# Patient Record
Sex: FEMALE | Race: WHITE | NOT HISPANIC OR LATINO | Employment: OTHER | URBAN - METROPOLITAN AREA
[De-identification: names, ages, dates, MRNs, and addresses within clinical notes are randomized per-mention and may not be internally consistent; named-entity substitution may affect disease eponyms.]

---

## 2017-03-13 ENCOUNTER — OFFICE VISIT (OUTPATIENT)
Dept: OBSTETRICS AND GYNECOLOGY | Facility: CLINIC | Age: 82
End: 2017-03-13

## 2017-03-13 ENCOUNTER — APPOINTMENT (OUTPATIENT)
Dept: WOMENS IMAGING | Facility: HOSPITAL | Age: 82
End: 2017-03-13

## 2017-03-13 VITALS
DIASTOLIC BLOOD PRESSURE: 84 MMHG | WEIGHT: 139.2 LBS | BODY MASS INDEX: 23.76 KG/M2 | HEIGHT: 64 IN | SYSTOLIC BLOOD PRESSURE: 126 MMHG | HEART RATE: 66 BPM

## 2017-03-13 DIAGNOSIS — Z12.12 SCREENING FOR RECTAL CANCER: ICD-10-CM

## 2017-03-13 DIAGNOSIS — Z78.0 MENOPAUSE: ICD-10-CM

## 2017-03-13 DIAGNOSIS — Z01.419 WELL WOMAN EXAM WITH ROUTINE GYNECOLOGICAL EXAM: Primary | ICD-10-CM

## 2017-03-13 LAB
DEVELOPER EXPIRATION DATE: NORMAL
DEVELOPER LOT NUMBER: NORMAL
EXPIRATION DATE: NORMAL
FECAL OCCULT BLOOD SCREEN, POC: NEGATIVE
Lab: NORMAL
NEGATIVE CONTROL: NEGATIVE
POSITIVE CONTROL: POSITIVE

## 2017-03-13 PROCEDURE — 99397 PER PM REEVAL EST PAT 65+ YR: CPT | Performed by: OBSTETRICS & GYNECOLOGY

## 2017-03-13 PROCEDURE — 82274 ASSAY TEST FOR BLOOD FECAL: CPT | Performed by: OBSTETRICS & GYNECOLOGY

## 2017-03-13 PROCEDURE — 77063 BREAST TOMOSYNTHESIS BI: CPT | Performed by: RADIOLOGY

## 2017-03-13 PROCEDURE — G0202 SCR MAMMO BI INCL CAD: HCPCS | Performed by: RADIOLOGY

## 2017-03-13 PROCEDURE — 77067 SCR MAMMO BI INCL CAD: CPT | Performed by: RADIOLOGY

## 2017-03-13 RX ORDER — METOPROLOL SUCCINATE 25 MG/1
TABLET, EXTENDED RELEASE ORAL
COMMUNITY
Start: 2014-06-23 | End: 2018-03-20

## 2017-03-13 RX ORDER — DOCUSATE SODIUM 100 MG/1
100 CAPSULE, LIQUID FILLED ORAL
COMMUNITY
End: 2019-10-01

## 2017-03-13 RX ORDER — ESCITALOPRAM OXALATE 5 MG/1
5 TABLET ORAL
COMMUNITY

## 2017-03-13 RX ORDER — SIMVASTATIN 20 MG
20 TABLET ORAL
COMMUNITY
End: 2019-10-01

## 2017-03-13 RX ORDER — VITAMIN E 268 MG
400 CAPSULE ORAL
COMMUNITY

## 2017-03-13 RX ORDER — AMOXICILLIN 500 MG
2400 CAPSULE ORAL
COMMUNITY
End: 2019-10-01

## 2017-03-13 RX ORDER — LEVOTHYROXINE SODIUM 0.07 MG/1
75 TABLET ORAL
COMMUNITY
End: 2019-10-01

## 2017-03-13 RX ORDER — MULTIVITAMIN
1 TABLET ORAL
COMMUNITY
End: 2019-10-01

## 2017-03-13 NOTE — PROGRESS NOTES
Anton De La Fuente is a 85 y.o. female     CC: Annual    Last annual 2016  Last pap 2011  Last mammogram 2017  Last colonoscopy 2015, benign polyps removed.      History of Present Illness: reports no gyn concerns or complaints.  Had mammogram today; up to date on colonoscopy.    The following portions of the patient's history were reviewed and updated as appropriate: allergies, current medications, past family history, past medical history, past social history, past surgical history and problem list.    Review of Systems   Constitutional: Negative for fatigue and fever.   HENT: Negative for congestion.    Eyes: Negative for visual disturbance.   Respiratory: Negative for chest tightness and shortness of breath.    Cardiovascular: Negative for chest pain, palpitations and leg swelling.   Gastrointestinal: Negative for abdominal pain and blood in stool.   Endocrine: Negative for cold intolerance.   Genitourinary: Negative for difficulty urinating, dysuria, frequency, genital sores, hematuria, menstrual problem, pelvic pain, urgency, vaginal bleeding, vaginal discharge and vaginal pain.   Musculoskeletal: Negative for back pain.   Skin: Negative for color change and rash.   Neurological: Negative for syncope and headaches.   Psychiatric/Behavioral: Negative for sleep disturbance.       Past Medical History   Diagnosis Date   • Chronic kidney disease (CKD), stage IV (severe)    • High cholesterol    • Hypothyroid      Past Surgical History   Procedure Laterality Date   • Hysterectomy     • Cystectomy       OB History      Para Term  AB TAB SAB Ectopic Multiple Living    2 2        2        Menstrual History:  OB History      Para Term  AB TAB SAB Ectopic Multiple Living    2 2        2         No LMP recorded. Patient has had a hysterectomy.       Family History   Problem Relation Age of Onset   • Breast cancer Paternal Aunt      History   Smoking  Status   • Never Smoker   Smokeless Tobacco   • Not on file     History   Alcohol Use No       Objective   Physical Exam   Constitutional: She is oriented to person, place, and time. She appears well-developed and well-nourished.   HENT:   Head: Normocephalic and atraumatic.   Right Ear: External ear normal.   Left Ear: External ear normal.   Nose: Nose normal.   Eyes: EOM are normal. Pupils are equal, round, and reactive to light.   Neck: Normal range of motion. Neck supple. No thyromegaly present.   Cardiovascular: Normal rate, regular rhythm and normal heart sounds.    No murmur heard.  Pulmonary/Chest: Effort normal and breath sounds normal. She has no wheezes. She has no rales. She exhibits no tenderness. Right breast exhibits no inverted nipple, no mass, no nipple discharge, no skin change and no tenderness. Left breast exhibits no inverted nipple, no mass, no nipple discharge, no skin change and no tenderness. There is no breast swelling.   Abdominal: Soft. Bowel sounds are normal. She exhibits no distension and no mass. There is no tenderness. Hernia confirmed negative in the right inguinal area and confirmed negative in the left inguinal area.   Genitourinary: Rectal exam shows no mass, no tenderness and guaiac negative stool. No breast tenderness. Pelvic exam was performed with patient supine. There is no rash, tenderness or lesion on the right labia. There is no rash, tenderness or lesion on the left labia. Right adnexum displays no mass and no tenderness. Left adnexum displays no mass and no tenderness. No erythema, tenderness or bleeding in the vagina. No vaginal discharge found.   Genitourinary Comments: S/P hyst.   Musculoskeletal: Normal range of motion. She exhibits no edema.   Neurological: She is alert and oriented to person, place, and time.   Skin: Skin is warm and dry. No rash noted.   Psychiatric: She has a normal mood and affect. Judgment normal.   Nursing note and vitals  reviewed.        Assessment/Plan   Diagnoses and all orders for this visit:    Well woman exam with routine gynecological exam    Screening for rectal cancer    Menopause      Will continue with annual WWE's, annual mammography, SBE's, and daily calcium + D

## 2018-03-20 ENCOUNTER — PROCEDURE VISIT (OUTPATIENT)
Dept: OBSTETRICS AND GYNECOLOGY | Facility: CLINIC | Age: 83
End: 2018-03-20

## 2018-03-20 ENCOUNTER — OFFICE VISIT (OUTPATIENT)
Dept: OBSTETRICS AND GYNECOLOGY | Facility: CLINIC | Age: 83
End: 2018-03-20

## 2018-03-20 ENCOUNTER — APPOINTMENT (OUTPATIENT)
Dept: WOMENS IMAGING | Facility: HOSPITAL | Age: 83
End: 2018-03-20

## 2018-03-20 VITALS — BODY MASS INDEX: 23.22 KG/M2 | WEIGHT: 136 LBS | HEIGHT: 64 IN

## 2018-03-20 DIAGNOSIS — Z01.419 ENCOUNTER FOR GYNECOLOGICAL EXAMINATION WITHOUT ABNORMAL FINDING: Primary | ICD-10-CM

## 2018-03-20 DIAGNOSIS — Z12.31 VISIT FOR SCREENING MAMMOGRAM: Primary | ICD-10-CM

## 2018-03-20 PROCEDURE — 77067 SCR MAMMO BI INCL CAD: CPT | Performed by: RADIOLOGY

## 2018-03-20 PROCEDURE — 99397 PER PM REEVAL EST PAT 65+ YR: CPT | Performed by: OBSTETRICS & GYNECOLOGY

## 2018-03-20 PROCEDURE — 77067 SCR MAMMO BI INCL CAD: CPT | Performed by: OBSTETRICS & GYNECOLOGY

## 2018-03-20 RX ORDER — SIMVASTATIN 20 MG
20 TABLET ORAL
COMMUNITY
End: 2019-10-01

## 2018-03-20 NOTE — PROGRESS NOTES
GYN Annual Exam     CC- Here for annual exam.     Marianna Terrell is a 86 y.o. female who presents for annual well woman exam. Periods are absent due to Hysterectomy.      OB History      Para Term  AB Living    2 2    2    SAB TAB Ectopic Molar Multiple Live Births                   Current contraception: status post hysterectomy  History of abnormal Pap smear: no  Family history of uterine, colon or ovarian cancer: no  History of abnormal mammogram: no  Family history of breast cancer: no  Last Pap : 2011 NL   Last Mammo: today  Last Dexa: 1 mth ago, osteopenia   Last Colonoscopy:     Past Medical History:   Diagnosis Date   • Chronic kidney disease (CKD), stage IV (severe)    • High cholesterol    • Hypothyroid        Past Surgical History:   Procedure Laterality Date   • CYSTECTOMY     • HYSTERECTOMY           Current Outpatient Prescriptions:   •  aspirin 81 MG tablet, Take 81 mg by mouth., Disp: , Rfl:   •  calcium citrate-vitamin d (CITRACAL) 200-250 MG-UNIT tablet tablet, Take 1 tablet by mouth., Disp: , Rfl:   •  docusate sodium (COLACE) 100 MG capsule, Take 100 mg by mouth., Disp: , Rfl:   •  escitalopram (LEXAPRO) 5 MG tablet, Take 5 mg by mouth., Disp: , Rfl:   •  levothyroxine (SYNTHROID, LEVOTHROID) 75 MCG tablet, Take 75 mcg by mouth., Disp: , Rfl:   •  Multiple Vitamin (MULTIVITAMIN) tablet, Take 1 tablet by mouth., Disp: , Rfl:   •  Omega-3 Fatty Acids (FISH OIL) 1200 MG capsule capsule, Take 2,400 mg by mouth., Disp: , Rfl:   •  simvastatin (ZOCOR) 20 MG tablet, Take 20 mg by mouth., Disp: , Rfl:   •  simvastatin (ZOCOR) 20 MG tablet, Take 20 mg by mouth., Disp: , Rfl:   •  vitamin E 400 UNIT capsule, Take 400 Units by mouth., Disp: , Rfl:     No Known Allergies    Social History   Substance Use Topics   • Smoking status: Never Smoker   • Smokeless tobacco: Never Used   • Alcohol use No       Family History   Problem Relation Age of Onset   • Breast cancer Paternal Aunt    •  "Ovarian cancer Neg Hx    • Uterine cancer Neg Hx    • Colon cancer Neg Hx    • Deep vein thrombosis Neg Hx    • Pulmonary embolism Neg Hx        Review of Systems   Constitutional: Negative for chills and fever.   Gastrointestinal: Negative for abdominal pain.   Genitourinary: Negative for dysuria, pelvic pain, vaginal bleeding and vaginal discharge.   All other systems reviewed and are negative.      Ht 162.6 cm (64\")   Wt 61.7 kg (136 lb)   Breastfeeding? No   BMI 23.34 kg/m²     Physical Exam   Constitutional: She is oriented to person, place, and time. She appears well-developed and well-nourished. No distress.   HENT:   Head: Normocephalic and atraumatic.   Eyes: Conjunctivae are normal.   Neck: Normal range of motion. Neck supple. No thyromegaly present.   Cardiovascular: Normal rate and regular rhythm.    No murmur heard.  Pulmonary/Chest: Effort normal and breath sounds normal. Right breast exhibits no inverted nipple, no mass and no nipple discharge. Left breast exhibits no inverted nipple, no mass and no nipple discharge.   Abdominal: Soft. Bowel sounds are normal. She exhibits no distension. There is no tenderness.   Genitourinary: Rectum normal and vagina normal. Rectal exam shows no mass and anal tone normal. Pelvic exam was performed with patient supine. There is no lesion on the right labia. There is no lesion on the left labia. Right adnexum displays no mass and no tenderness. Left adnexum displays no mass and no tenderness. No bleeding (cuff closed. No uterus or cervix ) in the vagina. No vaginal discharge found.   Musculoskeletal: She exhibits no edema.   Lymphadenopathy:        Right: No inguinal adenopathy present.        Left: No inguinal adenopathy present.   Neurological: She is alert and oriented to person, place, and time.   Skin: No rash noted.   Psychiatric: She has a normal mood and affect. Her behavior is normal.          Assessment     1) GYN annual well woman exam.        Plan "     1) Breast Health - Clinical breast exam & mammogram yearly, Self breast awareness monthly  2) Pap - updated today   3) Smoking status- Non-smoking   4) Colon health - screening colonoscopy recommended if not up to date  5) Bone health - Weight bearing exercise, dietary calcium recommendations and vitamin D reviewed.   6) Seat belts & Sunscreen recommended  7) Follow up prn and one year      Saw Monroe MD   3/20/2018  1:26 PM

## 2018-03-22 LAB
CYTOLOGIST CVX/VAG CYTO: NORMAL
CYTOLOGY CVX/VAG DOC THIN PREP: NORMAL
DX ICD CODE: NORMAL
HIV 1 & 2 AB SER-IMP: NORMAL
HPV I/H RISK 1 DNA CVX QL PROBE+SIG AMP: NORMAL
HPV I/H RISK 4 DNA CVX QL PROBE+SIG AMP: NEGATIVE
OTHER STN SPEC: NORMAL
PATH REPORT.FINAL DX SPEC: NORMAL
STAT OF ADQ CVX/VAG CYTO-IMP: NORMAL

## 2019-03-28 ENCOUNTER — TELEPHONE (OUTPATIENT)
Dept: OBSTETRICS AND GYNECOLOGY | Facility: CLINIC | Age: 84
End: 2019-03-28

## 2019-10-01 ENCOUNTER — OFFICE VISIT (OUTPATIENT)
Dept: OBSTETRICS AND GYNECOLOGY | Facility: CLINIC | Age: 84
End: 2019-10-01

## 2019-10-01 VITALS — BODY MASS INDEX: 21.34 KG/M2 | WEIGHT: 125 LBS | HEIGHT: 64 IN

## 2019-10-01 DIAGNOSIS — Z13.820 OSTEOPOROSIS SCREENING: ICD-10-CM

## 2019-10-01 DIAGNOSIS — Z01.419 ENCOUNTER FOR GYNECOLOGICAL EXAMINATION WITHOUT ABNORMAL FINDING: Primary | ICD-10-CM

## 2019-10-01 PROCEDURE — 99397 PER PM REEVAL EST PAT 65+ YR: CPT | Performed by: OBSTETRICS & GYNECOLOGY

## 2019-10-01 RX ORDER — AMOXICILLIN 250 MG
2 CAPSULE ORAL
COMMUNITY
Start: 2019-01-15

## 2019-10-01 RX ORDER — ALENDRONATE SODIUM 70 MG/1
70 TABLET ORAL
Qty: 12 TABLET | Refills: 3 | Status: SHIPPED | OUTPATIENT
Start: 2019-10-01 | End: 2019-12-20 | Stop reason: SDUPTHER

## 2019-10-01 RX ORDER — LEVOTHYROXINE SODIUM 0.07 MG/1
75 TABLET ORAL DAILY
COMMUNITY

## 2019-10-01 RX ORDER — SIMVASTATIN 20 MG
20 TABLET ORAL DAILY
COMMUNITY

## 2019-10-01 RX ORDER — MULTIVITAMIN
1 TABLET ORAL DAILY
COMMUNITY

## 2019-10-01 RX ORDER — AMOXICILLIN 500 MG
1 CAPSULE ORAL DAILY
COMMUNITY

## 2019-10-01 NOTE — PROGRESS NOTES
GYN Annual Exam     CC- Here for annual exam.     Marianna Terrell is a 88 y.o. female who presents for annual well woman exam. Periods are absent due to Hysterectomy.      OB History      Para Term  AB Living    2 2       2    SAB TAB Ectopic Molar Multiple Live Births                         Current contraception: status post hysterectomy  History of abnormal Pap smear: no  Family history of uterine, colon or ovarian cancer: no  History of abnormal mammogram: no  Family history of breast cancer: yes - paternal aunt   Last Pap : 2018 NL HPV neg  Last mammogram: 2018   Last colonoscopy:   Last DEXA: 2018- Osteopenia  Paternal Hip Fracture: No     Past Medical History:   Diagnosis Date   • Anxiety    • Chronic kidney disease (CKD), stage IV (severe) (CMS/HCC)    • High cholesterol    • Hypothyroid        Past Surgical History:   Procedure Laterality Date   • CYSTECTOMY     • HYSTERECTOMY           Current Outpatient Medications:   •  senna-docusate (PERICOLACE) 8.6-50 MG per tablet, Take 2 tablets by mouth., Disp: , Rfl:   •  aspirin 81 MG tablet, Take 81 mg by mouth., Disp: , Rfl:   •  calcium citrate-vitamin d (CITRACAL) 200-250 MG-UNIT tablet tablet, Take 1 tablet by mouth Daily., Disp: , Rfl:   •  escitalopram (LEXAPRO) 5 MG tablet, Take 5 mg by mouth., Disp: , Rfl:   •  levothyroxine (SYNTHROID, LEVOTHROID) 75 MCG tablet, Take 75 mcg by mouth Daily., Disp: , Rfl:   •  metoprolol tartrate (LOPRESSOR) 25 MG tablet, , Disp: , Rfl:   •  Multiple Vitamin (MULTIVITAMIN) tablet, Take 1 tablet by mouth Daily., Disp: , Rfl:   •  Omega-3 Fatty Acids (FISH OIL) 1200 MG capsule capsule, Take 1 capsule by mouth Daily., Disp: , Rfl:   •  simvastatin (ZOCOR) 20 MG tablet, Take 20 mg by mouth Daily., Disp: , Rfl:   •  vitamin E 400 UNIT capsule, Take 400 Units by mouth., Disp: , Rfl:     No Known Allergies    Social History     Tobacco Use   • Smoking status: Never Smoker   • Smokeless tobacco: Never  "Used   Substance Use Topics   • Alcohol use: No   • Drug use: No       Family History   Problem Relation Age of Onset   • Breast cancer Paternal Aunt    • Ovarian cancer Neg Hx    • Uterine cancer Neg Hx    • Colon cancer Neg Hx    • Deep vein thrombosis Neg Hx    • Pulmonary embolism Neg Hx        Review of Systems   Constitutional: Negative for chills and fever.   Gastrointestinal: Negative for abdominal pain.   Genitourinary: Negative for dysuria, pelvic pain, vaginal bleeding and vaginal discharge.   All other systems reviewed and are negative.      Ht 162.6 cm (64\")   Wt 56.7 kg (125 lb)   Breastfeeding? No   BMI 21.46 kg/m²     Physical Exam   Constitutional: She is oriented to person, place, and time. She appears well-developed and well-nourished. No distress. She is not obese.  HENT:   Head: Normocephalic and atraumatic.   Eyes: Conjunctivae are normal. Right eye exhibits no discharge. Left eye exhibits no discharge.   Neck: Normal range of motion. Neck supple. No thyromegaly present.   Cardiovascular: Normal rate, regular rhythm and normal heart sounds.   No murmur heard.  Pulmonary/Chest: Effort normal and breath sounds normal. No respiratory distress. Right breast exhibits no inverted nipple, no mass and no nipple discharge. Left breast exhibits no inverted nipple, no mass and no nipple discharge.   Abdominal: Soft. Bowel sounds are normal. She exhibits no distension. There is no tenderness.   Genitourinary: Rectum normal and vagina normal. Rectal exam shows no mass and anal tone normal. Pelvic exam was performed with patient supine. There is no lesion or Bartholin's cyst on the right labia. There is no lesion or Bartholin's cyst on the left labia. Uterus is absent.   Cervix is absent. Right adnexum displays no mass, no tenderness and no fullness. Right adnexum is palpable.Left adnexum is non-palpable.Vagina exhibits no loss of rugae. No bleeding in the vagina. No vaginal discharge found. "   Musculoskeletal: Normal range of motion. She exhibits no edema.   Lymphadenopathy:     She has no cervical adenopathy.        Right: No inguinal adenopathy present.        Left: No inguinal adenopathy present.   Neurological: She is alert and oriented to person, place, and time.   Skin: Skin is warm and dry. No rash noted.   Psychiatric: She has a normal mood and affect. Her behavior is normal. Judgment and thought content normal.          Assessment     1) GYN annual well woman exam.   2) FRAX risk at 33% and 18 % for osteoporotic fracture and hip fracture   (with no DEXA information, not in chart)   Trial of fosamax - 70 mg sent for once a week      Plan     1) Breast Health - Clinical breast exam & mammogram yearly, Self breast awareness monthly  2) Pap - up to date   3) Smoking status- non-smoker   4) Colon health - screening colonoscopy recommended if not up to date  5) Bone health - Weight bearing exercise, dietary calcium recommendations and vitamin D reviewed.   Need DEXA and consider treatment with bisphosphonate   6) Activity recommends - Adult 150-300 min/week of multi-component physical activities that include balance training, aerobic and physical strengthening.  Disabled or ill adults should still try to fulfill these requirements, with modifications based on their conditions.   7) Follow up prn and one year      Saw Monroe MD   10/1/2019  9:35 AM

## 2019-10-16 ENCOUNTER — APPOINTMENT (OUTPATIENT)
Dept: WOMENS IMAGING | Facility: HOSPITAL | Age: 84
End: 2019-10-16

## 2019-10-16 ENCOUNTER — PROCEDURE VISIT (OUTPATIENT)
Dept: OBSTETRICS AND GYNECOLOGY | Facility: CLINIC | Age: 84
End: 2019-10-16

## 2019-10-16 DIAGNOSIS — Z12.31 VISIT FOR SCREENING MAMMOGRAM: Primary | ICD-10-CM

## 2019-10-16 PROCEDURE — 77067 SCR MAMMO BI INCL CAD: CPT | Performed by: RADIOLOGY

## 2019-10-16 PROCEDURE — 77063 BREAST TOMOSYNTHESIS BI: CPT | Performed by: OBSTETRICS & GYNECOLOGY

## 2019-10-16 PROCEDURE — 77063 BREAST TOMOSYNTHESIS BI: CPT | Performed by: RADIOLOGY

## 2019-10-16 PROCEDURE — 77067 SCR MAMMO BI INCL CAD: CPT | Performed by: OBSTETRICS & GYNECOLOGY

## 2019-12-20 ENCOUNTER — TELEPHONE (OUTPATIENT)
Dept: OBSTETRICS AND GYNECOLOGY | Facility: CLINIC | Age: 84
End: 2019-12-20

## 2019-12-20 RX ORDER — ALENDRONATE SODIUM 70 MG/1
70 TABLET ORAL
Qty: 12 TABLET | Refills: 3 | Status: SHIPPED | OUTPATIENT
Start: 2019-12-20

## 2019-12-20 NOTE — TELEPHONE ENCOUNTER
Marialuisa    I sent in Rx for Fosamax weekly to treat her increased risk of fracture. Please let her know. She should continue to be on this.     Thanks   Dr. Monroe    Patient called she said that she was given samples to try that were for her bones she said that she just finished taking her last one and she wants to know if she needs to continue taking them if so she said she will need a prescription called in. Patient did not know name of prescription she said that it was for her bones though.

## 2023-09-13 ENCOUNTER — TELEPHONE (OUTPATIENT)
Dept: NEUROLOGY | Facility: CLINIC | Age: 88
End: 2023-09-13
Payer: COMMERCIAL

## 2023-09-13 NOTE — TELEPHONE ENCOUNTER
Left vm, Susyt (if applicable) & mailed reminder regarding apt reschedule due to provider being out of office in October.

## 2024-06-28 ENCOUNTER — APPOINTMENT (OUTPATIENT)
Dept: GENERAL RADIOLOGY | Facility: HOSPITAL | Age: 89
End: 2024-06-28
Payer: MEDICARE

## 2024-06-28 ENCOUNTER — APPOINTMENT (OUTPATIENT)
Dept: CT IMAGING | Facility: HOSPITAL | Age: 89
End: 2024-06-28
Payer: COMMERCIAL

## 2024-06-28 ENCOUNTER — HOSPITAL ENCOUNTER (INPATIENT)
Facility: HOSPITAL | Age: 89
LOS: 3 days | Discharge: NURSING, REHAB OR LONG-TERM CARE | End: 2024-07-03
Attending: EMERGENCY MEDICINE | Admitting: STUDENT IN AN ORGANIZED HEALTH CARE EDUCATION/TRAINING PROGRAM
Payer: COMMERCIAL

## 2024-06-28 DIAGNOSIS — R06.1 EXPIRATORY STRIDOR: ICD-10-CM

## 2024-06-28 DIAGNOSIS — S09.90XA CLOSED HEAD INJURY, INITIAL ENCOUNTER: ICD-10-CM

## 2024-06-28 DIAGNOSIS — N30.00 ACUTE CYSTITIS WITHOUT HEMATURIA: Primary | ICD-10-CM

## 2024-06-28 LAB
BASOPHILS # BLD AUTO: 0.07 10*3/MM3 (ref 0–0.2)
BASOPHILS NFR BLD AUTO: 0.4 % (ref 0–1.5)
DEPRECATED RDW RBC AUTO: 43.8 FL (ref 37–54)
EOSINOPHIL # BLD AUTO: 0.13 10*3/MM3 (ref 0–0.4)
EOSINOPHIL NFR BLD AUTO: 0.8 % (ref 0.3–6.2)
ERYTHROCYTE [DISTWIDTH] IN BLOOD BY AUTOMATED COUNT: 12 % (ref 12.3–15.4)
HCT VFR BLD AUTO: 43.1 % (ref 34–46.6)
HGB BLD-MCNC: 14.1 G/DL (ref 12–15.9)
IMM GRANULOCYTES # BLD AUTO: 0.12 10*3/MM3 (ref 0–0.05)
IMM GRANULOCYTES NFR BLD AUTO: 0.7 % (ref 0–0.5)
LYMPHOCYTES # BLD AUTO: 1.87 10*3/MM3 (ref 0.7–3.1)
LYMPHOCYTES NFR BLD AUTO: 11.7 % (ref 19.6–45.3)
MCH RBC QN AUTO: 32.3 PG (ref 26.6–33)
MCHC RBC AUTO-ENTMCNC: 32.7 G/DL (ref 31.5–35.7)
MCV RBC AUTO: 98.6 FL (ref 79–97)
MONOCYTES # BLD AUTO: 1.19 10*3/MM3 (ref 0.1–0.9)
MONOCYTES NFR BLD AUTO: 7.4 % (ref 5–12)
NEUTROPHILS NFR BLD AUTO: 12.66 10*3/MM3 (ref 1.7–7)
NEUTROPHILS NFR BLD AUTO: 79 % (ref 42.7–76)
NRBC BLD AUTO-RTO: 0 /100 WBC (ref 0–0.2)
PLATELET # BLD AUTO: 230 10*3/MM3 (ref 140–450)
PMV BLD AUTO: 10 FL (ref 6–12)
RBC # BLD AUTO: 4.37 10*6/MM3 (ref 3.77–5.28)
WBC NRBC COR # BLD AUTO: 16.04 10*3/MM3 (ref 3.4–10.8)

## 2024-06-28 PROCEDURE — 81001 URINALYSIS AUTO W/SCOPE: CPT | Performed by: EMERGENCY MEDICINE

## 2024-06-28 PROCEDURE — 94640 AIRWAY INHALATION TREATMENT: CPT

## 2024-06-28 PROCEDURE — 94761 N-INVAS EAR/PLS OXIMETRY MLT: CPT

## 2024-06-28 PROCEDURE — 83880 ASSAY OF NATRIURETIC PEPTIDE: CPT | Performed by: EMERGENCY MEDICINE

## 2024-06-28 PROCEDURE — 80053 COMPREHEN METABOLIC PANEL: CPT | Performed by: EMERGENCY MEDICINE

## 2024-06-28 PROCEDURE — 36415 COLL VENOUS BLD VENIPUNCTURE: CPT

## 2024-06-28 PROCEDURE — 87086 URINE CULTURE/COLONY COUNT: CPT | Performed by: EMERGENCY MEDICINE

## 2024-06-28 PROCEDURE — 94799 UNLISTED PULMONARY SVC/PX: CPT

## 2024-06-28 PROCEDURE — 99285 EMERGENCY DEPT VISIT HI MDM: CPT

## 2024-06-28 PROCEDURE — 87186 SC STD MICRODIL/AGAR DIL: CPT | Performed by: EMERGENCY MEDICINE

## 2024-06-28 PROCEDURE — 70450 CT HEAD/BRAIN W/O DYE: CPT

## 2024-06-28 PROCEDURE — 87088 URINE BACTERIA CULTURE: CPT | Performed by: EMERGENCY MEDICINE

## 2024-06-28 PROCEDURE — 72125 CT NECK SPINE W/O DYE: CPT

## 2024-06-28 PROCEDURE — 84484 ASSAY OF TROPONIN QUANT: CPT | Performed by: EMERGENCY MEDICINE

## 2024-06-28 PROCEDURE — 71045 X-RAY EXAM CHEST 1 VIEW: CPT

## 2024-06-28 PROCEDURE — 85025 COMPLETE CBC W/AUTO DIFF WBC: CPT | Performed by: EMERGENCY MEDICINE

## 2024-06-28 RX ORDER — ALBUTEROL SULFATE 2.5 MG/3ML
2.5 SOLUTION RESPIRATORY (INHALATION) ONCE
Status: COMPLETED | OUTPATIENT
Start: 2024-06-28 | End: 2024-06-28

## 2024-06-28 RX ADMIN — ALBUTEROL SULFATE 2.5 MG: 2.5 SOLUTION RESPIRATORY (INHALATION) at 23:47

## 2024-06-29 ENCOUNTER — APPOINTMENT (OUTPATIENT)
Dept: CT IMAGING | Facility: HOSPITAL | Age: 89
End: 2024-06-29
Payer: COMMERCIAL
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
## 2024-06-29 PROBLEM — F03.90 DEMENTIA: Status: ACTIVE | Noted: 2024-06-29

## 2024-06-29 PROBLEM — E03.9 HYPOTHYROIDISM (ACQUIRED): Status: ACTIVE | Noted: 2024-06-29

## 2024-06-29 PROBLEM — N39.0 UTI (URINARY TRACT INFECTION): Status: ACTIVE | Noted: 2024-06-29

## 2024-06-29 PROBLEM — E78.5 HYPERLIPIDEMIA: Status: ACTIVE | Noted: 2024-06-29

## 2024-06-29 PROBLEM — N18.9 CKD (CHRONIC KIDNEY DISEASE): Status: ACTIVE | Noted: 2024-06-29

## 2024-06-29 LAB
ALBUMIN SERPL-MCNC: 4.2 G/DL (ref 3.5–5.2)
ALBUMIN/GLOB SERPL: 1.8 G/DL
ALP SERPL-CCNC: 42 U/L (ref 39–117)
ALT SERPL W P-5'-P-CCNC: 14 U/L (ref 1–33)
ANION GAP SERPL CALCULATED.3IONS-SCNC: 11.7 MMOL/L (ref 5–15)
ANION GAP SERPL CALCULATED.3IONS-SCNC: 13.3 MMOL/L (ref 5–15)
AST SERPL-CCNC: 11 U/L (ref 1–32)
BACTERIA UR QL AUTO: ABNORMAL /HPF
BILIRUB SERPL-MCNC: 0.4 MG/DL (ref 0–1.2)
BILIRUB UR QL STRIP: NEGATIVE
BUN SERPL-MCNC: 22 MG/DL (ref 8–23)
BUN SERPL-MCNC: 22 MG/DL (ref 8–23)
BUN/CREAT SERPL: 26.5 (ref 7–25)
BUN/CREAT SERPL: 31 (ref 7–25)
CALCIUM SPEC-SCNC: 9.1 MG/DL (ref 8.2–9.6)
CALCIUM SPEC-SCNC: 9.8 MG/DL (ref 8.2–9.6)
CHLORIDE SERPL-SCNC: 101 MMOL/L (ref 98–107)
CHLORIDE SERPL-SCNC: 101 MMOL/L (ref 98–107)
CLARITY UR: CLEAR
CO2 SERPL-SCNC: 23.3 MMOL/L (ref 22–29)
CO2 SERPL-SCNC: 24.7 MMOL/L (ref 22–29)
COLOR UR: YELLOW
CREAT SERPL-MCNC: 0.71 MG/DL (ref 0.57–1)
CREAT SERPL-MCNC: 0.83 MG/DL (ref 0.57–1)
D-LACTATE SERPL-SCNC: 1.2 MMOL/L (ref 0.5–2)
DEPRECATED RDW RBC AUTO: 43.3 FL (ref 37–54)
EGFRCR SERPLBLD CKD-EPI 2021: 65.8 ML/MIN/1.73
EGFRCR SERPLBLD CKD-EPI 2021: 79.4 ML/MIN/1.73
ERYTHROCYTE [DISTWIDTH] IN BLOOD BY AUTOMATED COUNT: 11.9 % (ref 12.3–15.4)
GEN 5 2HR TROPONIN T REFLEX: 23 NG/L
GLOBULIN UR ELPH-MCNC: 2.4 GM/DL
GLUCOSE SERPL-MCNC: 109 MG/DL (ref 65–99)
GLUCOSE SERPL-MCNC: 129 MG/DL (ref 65–99)
GLUCOSE UR STRIP-MCNC: NEGATIVE MG/DL
HCT VFR BLD AUTO: 38.7 % (ref 34–46.6)
HGB BLD-MCNC: 12.8 G/DL (ref 12–15.9)
HGB UR QL STRIP.AUTO: ABNORMAL
HYALINE CASTS UR QL AUTO: ABNORMAL /LPF
KETONES UR QL STRIP: NEGATIVE
LEUKOCYTE ESTERASE UR QL STRIP.AUTO: ABNORMAL
MCH RBC QN AUTO: 32.6 PG (ref 26.6–33)
MCHC RBC AUTO-ENTMCNC: 33.1 G/DL (ref 31.5–35.7)
MCV RBC AUTO: 98.5 FL (ref 79–97)
NITRITE UR QL STRIP: POSITIVE
NT-PROBNP SERPL-MCNC: 193 PG/ML (ref 0–1800)
PH UR STRIP.AUTO: 7 [PH] (ref 5–8)
PLATELET # BLD AUTO: 210 10*3/MM3 (ref 140–450)
PMV BLD AUTO: 10.1 FL (ref 6–12)
POTASSIUM SERPL-SCNC: 4.5 MMOL/L (ref 3.5–5.2)
POTASSIUM SERPL-SCNC: 4.5 MMOL/L (ref 3.5–5.2)
PROT SERPL-MCNC: 6.6 G/DL (ref 6–8.5)
PROT UR QL STRIP: NEGATIVE
QT INTERVAL: 410 MS
QTC INTERVAL: 410 MS
RBC # BLD AUTO: 3.93 10*6/MM3 (ref 3.77–5.28)
RBC # UR STRIP: ABNORMAL /HPF
REF LAB TEST METHOD: ABNORMAL
SODIUM SERPL-SCNC: 136 MMOL/L (ref 136–145)
SODIUM SERPL-SCNC: 139 MMOL/L (ref 136–145)
SP GR UR STRIP: 1.02 (ref 1–1.03)
SQUAMOUS #/AREA URNS HPF: ABNORMAL /HPF
TROPONIN T DELTA: 0 NG/L
TROPONIN T SERPL HS-MCNC: 22 NG/L
TROPONIN T SERPL HS-MCNC: 23 NG/L
TSH SERPL DL<=0.05 MIU/L-ACNC: 3.54 UIU/ML (ref 0.27–4.2)
UROBILINOGEN UR QL STRIP: ABNORMAL
WBC # UR STRIP: ABNORMAL /HPF
WBC NRBC COR # BLD AUTO: 10.56 10*3/MM3 (ref 3.4–10.8)

## 2024-06-29 PROCEDURE — 84484 ASSAY OF TROPONIN QUANT: CPT | Performed by: NURSE PRACTITIONER

## 2024-06-29 PROCEDURE — 83605 ASSAY OF LACTIC ACID: CPT | Performed by: EMERGENCY MEDICINE

## 2024-06-29 PROCEDURE — G0378 HOSPITAL OBSERVATION PER HR: HCPCS

## 2024-06-29 PROCEDURE — 25510000001 IOPAMIDOL 61 % SOLUTION: Performed by: EMERGENCY MEDICINE

## 2024-06-29 PROCEDURE — 84443 ASSAY THYROID STIM HORMONE: CPT | Performed by: NURSE PRACTITIONER

## 2024-06-29 PROCEDURE — 93005 ELECTROCARDIOGRAM TRACING: CPT | Performed by: EMERGENCY MEDICINE

## 2024-06-29 PROCEDURE — 80048 BASIC METABOLIC PNL TOTAL CA: CPT | Performed by: NURSE PRACTITIONER

## 2024-06-29 PROCEDURE — 85027 COMPLETE CBC AUTOMATED: CPT | Performed by: NURSE PRACTITIONER

## 2024-06-29 PROCEDURE — 84484 ASSAY OF TROPONIN QUANT: CPT | Performed by: EMERGENCY MEDICINE

## 2024-06-29 PROCEDURE — 36415 COLL VENOUS BLD VENIPUNCTURE: CPT | Performed by: NURSE PRACTITIONER

## 2024-06-29 PROCEDURE — 70491 CT SOFT TISSUE NECK W/DYE: CPT

## 2024-06-29 PROCEDURE — 93010 ELECTROCARDIOGRAM REPORT: CPT | Performed by: STUDENT IN AN ORGANIZED HEALTH CARE EDUCATION/TRAINING PROGRAM

## 2024-06-29 PROCEDURE — 87040 BLOOD CULTURE FOR BACTERIA: CPT | Performed by: NURSE PRACTITIONER

## 2024-06-29 PROCEDURE — 25010000002 CEFTRIAXONE PER 250 MG: Performed by: EMERGENCY MEDICINE

## 2024-06-29 PROCEDURE — 25010000002 CEFTRIAXONE PER 250 MG: Performed by: NURSE PRACTITIONER

## 2024-06-29 RX ORDER — BISACODYL 5 MG/1
5 TABLET, DELAYED RELEASE ORAL DAILY PRN
Status: DISCONTINUED | OUTPATIENT
Start: 2024-06-29 | End: 2024-07-03 | Stop reason: HOSPADM

## 2024-06-29 RX ORDER — CALCIUM CARBONATE 500 MG/1
2 TABLET, CHEWABLE ORAL 2 TIMES DAILY PRN
Status: DISCONTINUED | OUTPATIENT
Start: 2024-06-29 | End: 2024-07-03 | Stop reason: HOSPADM

## 2024-06-29 RX ORDER — BISACODYL 10 MG
10 SUPPOSITORY, RECTAL RECTAL DAILY PRN
Status: DISCONTINUED | OUTPATIENT
Start: 2024-06-29 | End: 2024-07-03 | Stop reason: HOSPADM

## 2024-06-29 RX ORDER — POLYETHYLENE GLYCOL 3350 17 G/17G
17 POWDER, FOR SOLUTION ORAL DAILY PRN
Status: DISCONTINUED | OUTPATIENT
Start: 2024-06-29 | End: 2024-07-03 | Stop reason: HOSPADM

## 2024-06-29 RX ORDER — AMOXICILLIN 250 MG
2 CAPSULE ORAL 2 TIMES DAILY PRN
Status: DISCONTINUED | OUTPATIENT
Start: 2024-06-29 | End: 2024-07-03 | Stop reason: HOSPADM

## 2024-06-29 RX ORDER — CALCIUM CARBONATE 500(1250)
500 TABLET ORAL DAILY
Status: DISCONTINUED | OUTPATIENT
Start: 2024-06-29 | End: 2024-07-03 | Stop reason: HOSPADM

## 2024-06-29 RX ORDER — SODIUM CHLORIDE 0.9 % (FLUSH) 0.9 %
10 SYRINGE (ML) INJECTION EVERY 12 HOURS SCHEDULED
Status: DISCONTINUED | OUTPATIENT
Start: 2024-06-29 | End: 2024-07-03 | Stop reason: HOSPADM

## 2024-06-29 RX ORDER — OXYBUTYNIN CHLORIDE 5 MG/1
5 TABLET ORAL 2 TIMES DAILY
Status: DISCONTINUED | OUTPATIENT
Start: 2024-06-29 | End: 2024-07-03 | Stop reason: HOSPADM

## 2024-06-29 RX ORDER — SODIUM CHLORIDE 9 MG/ML
40 INJECTION, SOLUTION INTRAVENOUS AS NEEDED
Status: DISCONTINUED | OUTPATIENT
Start: 2024-06-29 | End: 2024-07-03 | Stop reason: HOSPADM

## 2024-06-29 RX ORDER — LEVOTHYROXINE SODIUM 0.07 MG/1
75 TABLET ORAL
Status: DISCONTINUED | OUTPATIENT
Start: 2024-06-29 | End: 2024-07-03 | Stop reason: HOSPADM

## 2024-06-29 RX ORDER — SODIUM CHLORIDE 0.9 % (FLUSH) 0.9 %
10 SYRINGE (ML) INJECTION AS NEEDED
Status: DISCONTINUED | OUTPATIENT
Start: 2024-06-29 | End: 2024-07-03 | Stop reason: HOSPADM

## 2024-06-29 RX ORDER — ATORVASTATIN CALCIUM 20 MG/1
10 TABLET, FILM COATED ORAL DAILY
Status: DISCONTINUED | OUTPATIENT
Start: 2024-06-29 | End: 2024-07-03 | Stop reason: HOSPADM

## 2024-06-29 RX ORDER — ESCITALOPRAM OXALATE 20 MG/1
20 TABLET ORAL DAILY
Status: DISCONTINUED | OUTPATIENT
Start: 2024-06-29 | End: 2024-07-03 | Stop reason: HOSPADM

## 2024-06-29 RX ORDER — ASPIRIN 81 MG/1
81 TABLET ORAL DAILY
Status: DISCONTINUED | OUTPATIENT
Start: 2024-06-29 | End: 2024-06-29

## 2024-06-29 RX ADMIN — ESCITALOPRAM 20 MG: 20 TABLET, FILM COATED ORAL at 10:17

## 2024-06-29 RX ADMIN — ATORVASTATIN CALCIUM 10 MG: 20 TABLET, FILM COATED ORAL at 10:17

## 2024-06-29 RX ADMIN — Medication 10 ML: at 08:39

## 2024-06-29 RX ADMIN — Medication 10 ML: at 21:03

## 2024-06-29 RX ADMIN — CEFTRIAXONE SODIUM 1000 MG: 1 INJECTION, POWDER, FOR SOLUTION INTRAMUSCULAR; INTRAVENOUS at 00:46

## 2024-06-29 RX ADMIN — OXYBUTYNIN CHLORIDE 5 MG: 5 TABLET ORAL at 10:17

## 2024-06-29 RX ADMIN — CEFTRIAXONE SODIUM 1000 MG: 1 INJECTION, POWDER, FOR SOLUTION INTRAMUSCULAR; INTRAVENOUS at 22:14

## 2024-06-29 RX ADMIN — IOPAMIDOL 75 ML: 612 INJECTION, SOLUTION INTRAVENOUS at 01:32

## 2024-06-29 RX ADMIN — Medication 500 MG: at 10:17

## 2024-06-29 RX ADMIN — ASPIRIN 81 MG: 81 TABLET, COATED ORAL at 10:17

## 2024-06-29 RX ADMIN — LEVOTHYROXINE SODIUM 75 MCG: 75 TABLET ORAL at 10:17

## 2024-06-29 RX ADMIN — OXYBUTYNIN CHLORIDE 5 MG: 5 TABLET ORAL at 21:02

## 2024-06-29 NOTE — PLAN OF CARE
Goal Outcome Evaluation:  Plan of Care Reviewed With: patient        Progress: improving  Outcome Evaluation: Sinus rhythm, 1L nc, A/Ox4, assist x2, brief on. Home meds added, Aspirin d/c per MD orders. Plans to d/c back to [facility] pending medical clearance.

## 2024-06-29 NOTE — ED PROVIDER NOTES
EMERGENCY DEPARTMENT ENCOUNTER  Room Number:  21/21  PCP: Maya Brizuela APRN  Independent Historians: Patient and EMS      HPI:  Chief Complaint: had concerns including Fall.     A complete HPI/ROS/PMH/PSH/SH/FH are unobtainable due to: Dementia    Chronic or social conditions impacting patient care (Social Determinants of Health): None      Context: Marianna Terrell is a 89 y.o. female with a medical history of anxiety, hypothyroidism, hearing loss, dementia who presents to the ED c/o acute fall.  The patient reports she had a fall in her apartment.  EMS reports it was unwitnessed.  She struck the back of her head.  She is not on blood thinners.  She denies any neck or back pain.  She states she remembers falling.      Review of prior external notes (non-ED) -and- Review of prior external test results outside of this encounter:  Laboratory evaluation 7/25/2023 shows normal CBC    Prescription drug monitoring program review:         PAST MEDICAL HISTORY  Active Ambulatory Problems     Diagnosis Date Noted    No Active Ambulatory Problems     Resolved Ambulatory Problems     Diagnosis Date Noted    No Resolved Ambulatory Problems     Past Medical History:   Diagnosis Date    Anxiety     Chronic kidney disease (CKD), stage IV (severe)     Dementia 01-22    Difficulty walking 01-22    High cholesterol     HL (hearing loss) 01-22    Hypothyroid          PAST SURGICAL HISTORY  Past Surgical History:   Procedure Laterality Date    CYSTECTOMY      HYSTERECTOMY      KNEE SURGERY           FAMILY HISTORY  Family History   Problem Relation Age of Onset    Breast cancer Paternal Aunt     Ovarian cancer Neg Hx     Uterine cancer Neg Hx     Colon cancer Neg Hx     Deep vein thrombosis Neg Hx     Pulmonary embolism Neg Hx          SOCIAL HISTORY  Social History     Socioeconomic History    Marital status: Other   Tobacco Use    Smoking status: Never    Smokeless tobacco: Never   Substance and Sexual Activity    Alcohol use: Never     Drug use: Never    Sexual activity: Not Currently         ALLERGIES  Patient has no known allergies.      REVIEW OF SYSTEMS  Review of Systems  Included in HPI  All systems reviewed and negative except for those discussed in HPI.      PHYSICAL EXAM    I have reviewed the triage vital signs and nursing notes.    ED Triage Vitals [06/28/24 2133]   Temp Heart Rate Resp BP SpO2   98.2 °F (36.8 °C) 62 16 152/78 94 %      Temp src Heart Rate Source Patient Position BP Location FiO2 (%)   Tympanic Monitor Sitting Right arm --       Physical Exam  GENERAL: Awake, alert, no acute distress  SKIN: Warm, dry  HENT: Normocephalic, atraumatic  EYES: no scleral icterus  CV: regular rhythm, regular rate  RESPIRATORY: normal effort, lungs with some forced upper airway wheezes with exhalation.  She is not in respiratory distress.  She speaks in full sentences..  ABDOMEN: soft, nontender, nondistended  MUSCULOSKELETAL: no deformity, no midline cervical, thoracic, or lumbar spine tenderness.  No tenderness to the shoulders, hips or knees.  NEURO: alert, moves all extremities, follows commands            LAB RESULTS  Recent Results (from the past 24 hour(s))   Comprehensive Metabolic Panel    Collection Time: 06/28/24 11:19 PM    Specimen: Blood   Result Value Ref Range    Glucose 129 (H) 65 - 99 mg/dL    BUN 22 8 - 23 mg/dL    Creatinine 0.83 0.57 - 1.00 mg/dL    Sodium 139 136 - 145 mmol/L    Potassium 4.5 3.5 - 5.2 mmol/L    Chloride 101 98 - 107 mmol/L    CO2 24.7 22.0 - 29.0 mmol/L    Calcium 9.8 (H) 8.2 - 9.6 mg/dL    Total Protein 6.6 6.0 - 8.5 g/dL    Albumin 4.2 3.5 - 5.2 g/dL    ALT (SGPT) 14 1 - 33 U/L    AST (SGOT) 11 1 - 32 U/L    Alkaline Phosphatase 42 39 - 117 U/L    Total Bilirubin 0.4 0.0 - 1.2 mg/dL    Globulin 2.4 gm/dL    A/G Ratio 1.8 g/dL    BUN/Creatinine Ratio 26.5 (H) 7.0 - 25.0    Anion Gap 13.3 5.0 - 15.0 mmol/L    eGFR 65.8 >60.0 mL/min/1.73   CBC Auto Differential    Collection Time: 06/28/24 11:19 PM     Specimen: Blood   Result Value Ref Range    WBC 16.04 (H) 3.40 - 10.80 10*3/mm3    RBC 4.37 3.77 - 5.28 10*6/mm3    Hemoglobin 14.1 12.0 - 15.9 g/dL    Hematocrit 43.1 34.0 - 46.6 %    MCV 98.6 (H) 79.0 - 97.0 fL    MCH 32.3 26.6 - 33.0 pg    MCHC 32.7 31.5 - 35.7 g/dL    RDW 12.0 (L) 12.3 - 15.4 %    RDW-SD 43.8 37.0 - 54.0 fl    MPV 10.0 6.0 - 12.0 fL    Platelets 230 140 - 450 10*3/mm3    Neutrophil % 79.0 (H) 42.7 - 76.0 %    Lymphocyte % 11.7 (L) 19.6 - 45.3 %    Monocyte % 7.4 5.0 - 12.0 %    Eosinophil % 0.8 0.3 - 6.2 %    Basophil % 0.4 0.0 - 1.5 %    Immature Grans % 0.7 (H) 0.0 - 0.5 %    Neutrophils, Absolute 12.66 (H) 1.70 - 7.00 10*3/mm3    Lymphocytes, Absolute 1.87 0.70 - 3.10 10*3/mm3    Monocytes, Absolute 1.19 (H) 0.10 - 0.90 10*3/mm3    Eosinophils, Absolute 0.13 0.00 - 0.40 10*3/mm3    Basophils, Absolute 0.07 0.00 - 0.20 10*3/mm3    Immature Grans, Absolute 0.12 (H) 0.00 - 0.05 10*3/mm3    nRBC 0.0 0.0 - 0.2 /100 WBC   High Sensitivity Troponin T    Collection Time: 06/28/24 11:19 PM    Specimen: Blood   Result Value Ref Range    HS Troponin T 23 (H) <14 ng/L   BNP    Collection Time: 06/28/24 11:19 PM    Specimen: Blood   Result Value Ref Range    proBNP 193.0 0.0 - 1,800.0 pg/mL   Urinalysis With Microscopic If Indicated (No Culture) - Urine, Clean Catch    Collection Time: 06/28/24 11:46 PM    Specimen: Urine, Clean Catch   Result Value Ref Range    Color, UA Yellow Yellow, Straw    Appearance, UA Clear Clear    pH, UA 7.0 5.0 - 8.0    Specific Gravity, UA 1.018 1.005 - 1.030    Glucose, UA Negative Negative    Ketones, UA Negative Negative    Bilirubin, UA Negative Negative    Blood, UA Trace (A) Negative    Protein, UA Negative Negative    Leuk Esterase, UA Small (1+) (A) Negative    Nitrite, UA Positive (A) Negative    Urobilinogen, UA 1.0 E.U./dL 0.2 - 1.0 E.U./dL   Urinalysis, Microscopic Only - Urine, Clean Catch    Collection Time: 06/28/24 11:46 PM    Specimen: Urine, Clean Catch    Result Value Ref Range    RBC, UA 0-2 None Seen, 0-2 /HPF    WBC, UA 21-50 (A) None Seen, 0-2 /HPF    Bacteria, UA 4+ (A) None Seen /HPF    Squamous Epithelial Cells, UA 0-2 None Seen, 0-2 /HPF    Hyaline Casts, UA None Seen None Seen /LPF    Methodology Automated Microscopy    Lactic Acid, Plasma    Collection Time: 06/29/24 12:45 AM    Specimen: Blood   Result Value Ref Range    Lactate 1.2 0.5 - 2.0 mmol/L   ECG 12 Lead Dyspnea    Collection Time: 06/29/24 12:48 AM   Result Value Ref Range    QT Interval 410 ms    QTC Interval 410 ms   High Sensitivity Troponin T 2Hr    Collection Time: 06/29/24  1:23 AM    Specimen: Blood   Result Value Ref Range    HS Troponin T 23 (H) <14 ng/L    Troponin T Delta 0 >=-4 - <+4 ng/L         RADIOLOGY  XR Chest 1 View    Result Date: 6/29/2024  Patient: CHARLENE GARCIA  Time Out: 01:03 Exam(s): XR CXR 1 VIEW EXAM:   XR Chest, 1 View CLINICAL HISTORY:    Reason for exam: Fall, shortness of breath. TECHNIQUE:   Frontal view of the chest. COMPARISON:   No previous studies. FINDINGS:   Lungs:  Unremarkable.  No consolidation.   Pleural space:  Unremarkable.  No pneumothorax.   Heart:  Heart is normal in size.  No cardiomegaly.   Mediastinum:  Unremarkable.  Normal mediastinal contour.   Bones joints:  Osteopenia.  No acute fracture.   Vasculature:  Atherosclerotic disease.   Other findings:  Hypoaeration. IMPRESSION:     1.  Marked hypoaeration. 2.  Atherosclerotic disease. 3.  No consolidative changes or pleural effusions. 4.  Osteopenia.     Electronically signed by Eliseo Ventura MD on 06-29-24 at 0103    CT Head Without Contrast, CT Cervical Spine Without Contrast    Result Date: 6/28/2024  CT HEAD AND CERVICAL SPINE WITHOUT CONTRAST  CLINICAL HISTORY: Fall. Hit the back of her head. Neck is sore all over  TECHNIQUE: CT scan of the head was obtained with 3 mm axial soft tissue and 2 mm bone algorithm images. No intravenous contrast was administered. Sagittal and coronal  reconstructions were obtained.  COMPARISON: None.  FINDINGS:   There is no evidence for an acute extra-axial hemorrhage or a calvarial fracture.  Moderate changes of chronic small vessel ischemic phenomena are noted. The ventricles, sulci, and cisterns are age-appropriate. The gray-white matter differentiation is within normal limits. The basal ganglia and thalami are unremarkable in appearance. The posterior fossa structures are within normal limits.  Incidental note is made of a small to moderate size right parietal scalp hematoma.       No evidence for acute traumatic intracranial pathology.   TECHNIQUE: CT scan of the cervical spine was obtained with 1 mm axial bone algorithm and 2 mm axial soft tissue algorithm images. Sagittal and coronal reconstructed images were obtained.  FINDINGS:  There is no evidence for acute fracture or bony malalignment involving the cervical spine.  Incidental degenerative phenomena are appreciated within the cervical spine with disc osteophyte complexes at C5-6 and C6 resulting in up to moderate degrees of stenoses. Multilevel foraminal stenotic changes are also noted from C3-4 down to C6-7.  IMPRESSION:  No evidence for acute fracture or bony malalignment involving the cervical spine.  Incidental degenerative phenomena as discussed above.    Radiation dose reduction techniques were utilized, including automated exposure control and exposure modulation based on body size.  This report was finalized on 6/28/2024 11:02 PM by Dr. Anil Hernandez M.D on Workstation: [id number]         MEDICATIONS GIVEN IN ER  Medications   albuterol (PROVENTIL) nebulizer solution 0.083% 2.5 mg/3mL (2.5 mg Nebulization Given 6/28/24 2347)   cefTRIAXone (ROCEPHIN) 1,000 mg in sodium chloride 0.9 % 100 mL MBP (0 mg Intravenous Stopped 6/29/24 0122)   iopamidol (ISOVUE-300) 61 % injection 75 mL (75 mL Intravenous Given 6/29/24 0132)         ORDERS PLACED DURING THIS VISIT:  Orders Placed This Encounter    Procedures    CT Head Without Contrast    CT Cervical Spine Without Contrast    XR Chest 1 View    CT Soft Tissue Neck With Contrast    Comprehensive Metabolic Panel    Urinalysis With Microscopic If Indicated (No Culture) - Urine, Clean Catch    CBC Auto Differential    Urinalysis, Microscopic Only - Urine, Clean Catch    Urinalysis With Culture If Indicated -    Lactic Acid, Plasma    High Sensitivity Troponin T    BNP    High Sensitivity Troponin T 2Hr    [facility] (on-call MD unless specified) Details    ECG 12 Lead Dyspnea    Initiate Observation Status    CBC & Differential         OUTPATIENT MEDICATION MANAGEMENT:  No current Epic-ordered facility-administered medications on file.     Current Outpatient Medications Ordered in Epic   Medication Sig Dispense Refill    alendronate (FOSAMAX) 70 MG tablet Take 1 tablet by mouth Every 7 (Seven) Days. (Patient not taking: Reported on 6/21/2023) 12 tablet 3    aspirin 81 MG tablet Take 1 tablet by mouth.      calcium citrate-vitamin d (CITRACAL) 200-250 MG-UNIT tablet tablet Take 1 tablet by mouth Daily.      escitalopram (LEXAPRO) 5 MG tablet Take 4 tablets by mouth.      levothyroxine (SYNTHROID, LEVOTHROID) 75 MCG tablet Take 1 tablet by mouth Daily.      metoprolol tartrate (LOPRESSOR) 25 MG tablet       Multiple Vitamin (MULTIVITAMIN) tablet Take 1 tablet by mouth Daily.      Omega-3 Fatty Acids (FISH OIL) 1200 MG capsule capsule Take 1 capsule by mouth Daily.      oxybutynin (DITROPAN) 5 MG tablet Take 1 tablet by mouth 2 (Two) Times a Day.      senna-docusate (PERICOLACE) 8.6-50 MG per tablet Take 2 tablets by mouth.      simvastatin (ZOCOR) 20 MG tablet Take 1 tablet by mouth Daily.      vitamin E 400 UNIT capsule Take 1 capsule by mouth.           PROCEDURES  Procedures            PROGRESS, DATA ANALYSIS, CONSULTS, AND MEDICAL DECISION MAKING  All labs have been independently interpreted by me.  All radiology studies have been reviewed by me. All EKG's have been  independently viewed and interpreted by me.  Discussion below represents my analysis of pertinent findings related to patient's condition, differential diagnosis, treatment plan and final disposition.    Differential diagnosis includes but is not limited to intracranial hemorrhage, cranial fracture, cervical spine fracture, hip fracture, syncope.    Clinical Scores:                   ED Course as of 06/29/24 0207   Fri Jun 28, 2024   2235 CT Head Without Contrast  My independent interpretation of the imaging study is no acute hemorrhage [TR]   2343 WBC(!): 16.04 [TR]   Sat Jun 29, 2024   0035 Bacteria, UA(!): 4+ [TR]   0035 WBC, UA(!): 21-50 [TR]   0114 EKG          EKG time: 0048  Rhythm/Rate: Normal sinus, rate 60  P waves and HI: Normal P, normal HI  QRS, axis: Narrow QRS, normal axis  ST and T waves: No acute    Independently Interpreted by me  No prior EKG available for comparison   [TR]   0118 Lactate: 1.2 [TR]   0118 proBNP: 193.0 [TR]   0118 HS Troponin T(!): 23 [TR]   0135 I reviewed the CT of the neck with contrast.  I see no airway obstruction.  The patient reports that she has had her stridor worked up in the past and no cause can be found.  Plan admission for her urinary tract infection and fall. [TR]   0206 Discussed with Maryse with [facility].  She agrees to admit to Dr. Cormier. [TR]      ED Course User Index  [TR] Lazarus Perez MD             AS OF 02:07 EDT VITALS:    BP - 108/56  HR - 56  TEMP - 98.2 °F (36.8 °C) (Tympanic)  O2 SATS - 92%    COMPLEXITY OF CARE  The patient requires admission.      DIAGNOSIS  Final diagnoses:   Acute cystitis without hematuria   Closed head injury, initial encounter   Expiratory stridor         DISPOSITION  ED Disposition       ED Disposition   Decision to Admit    Condition   --    Comment   Level of Care: Telemetry [5]   Diagnosis: UTI (urinary tract infection) [[id number]]   Admitting Physician: MICHAEL CORMIER [[id number]]   Attending Physician: MICHAEL CORMIER [[id number]]   Bed  Request Comments: not 5 [address]                  Please note that portions of this document were completed with a voice recognition program.    Note Disclaimer: At [facility], we believe that sharing information builds trust and better relationships. You are receiving this note because you recently visited [facility]. It is possible you will see health information before a provider has talked with you about it. This kind of information can be easy to misunderstand. To help you fully understand what it means for your health, we urge you to discuss this note with your provider.         Lazarus Perez MD  06/29/24 0206

## 2024-06-29 NOTE — H&P
Patient Name:  Marianna Terrell  YOB: 1931  MRN:  [id number]  Admit Date:  6/28/2024  Patient Care Team:  Maya Brizuela APRN as PCP - General (Nurse Practitioner)  Arnel Freitas MD (Inactive) as Obstetrician (Gynecology)      Subjective   History Present Illness     Chief Complaint   Patient presents with    Fall       Ms. Terrell is a 89 y.o. female with a history of CKD, dementia, HLD, hypothyroid, breast cancer that presents to [facility] due to an unwitnessed fall sent here by staff at [facility] where she resides.  Staff reported she was confused but she was alert and oriented x 4 per ED notes.  She was noted with a contusion on the back of her head and underwent CT scan of the head, neck and C-spine all with negative acute findings.  She was found with elevated white count and dirty UA, started on Rocephin for urinary tract infection and admitted to the hospital for further evaluation and treatment.    Patient tells me she tripped or slipped, not sure which, just fell and hit her head.  She is not sure what she was doing when she did fall.  She denies any dizziness before or after the fall.  Did not want to come to the hospital.  Denies pain and feels okay other than wants to take a nap.  Tells me she has a bump on the back of her head, it does not hurt unless it is touched.  Denies loss of consciousness.  No vision changes.  No shortness of breath, chest pain, palpitations, cough, lightheadedness, dizziness, nausea, vomiting, diarrhea.  No burning or pain with urination.  No abdominal pain.  She is alert and oriented to person, birthdate and year.  Initially stating we are at [facility], but easily orients when reminded [facility] closed a long time ago.  She does seem to be a limited historian and is not fully answering all ROS questions.      There was some mention of expiratory stridor when talking.  I do not appreciate  any stridor at this time.      Review of Systems   Unable to perform ROS: Dementia        Personal History     Past Medical History:   Diagnosis Date    Anxiety     Chronic kidney disease (CKD), stage IV (severe)     Dementia 01-22    Difficulty walking 01-22    High cholesterol     HL (hearing loss) 01-22    Hypothyroid      Past Surgical History:   Procedure Laterality Date    CYSTECTOMY      HYSTERECTOMY      KNEE SURGERY       Family History   Problem Relation Age of Onset    Breast cancer Paternal Aunt     Ovarian cancer Neg Hx     Uterine cancer Neg Hx     Colon cancer Neg Hx     Deep vein thrombosis Neg Hx     Pulmonary embolism Neg Hx      Social History     Tobacco Use    Smoking status: Never    Smokeless tobacco: Never   Vaping Use    Vaping status: Never Used   Substance Use Topics    Alcohol use: Never    Drug use: Never     No current facility-administered medications on file prior to encounter.     Current Outpatient Medications on File Prior to Encounter   Medication Sig Dispense Refill    alendronate (FOSAMAX) 70 MG tablet Take 1 tablet by mouth Every 7 (Seven) Days. (Patient not taking: Reported on 6/21/2023) 12 tablet 3    aspirin 81 MG tablet Take 1 tablet by mouth.      calcium citrate-vitamin d (CITRACAL) 200-250 MG-UNIT tablet tablet Take 1 tablet by mouth Daily.      escitalopram (LEXAPRO) 5 MG tablet Take 4 tablets by mouth.      levothyroxine (SYNTHROID, LEVOTHROID) 75 MCG tablet Take 1 tablet by mouth Daily.      metoprolol tartrate (LOPRESSOR) 25 MG tablet       Multiple Vitamin (MULTIVITAMIN) tablet Take 1 tablet by mouth Daily.      Omega-3 Fatty Acids (FISH OIL) 1200 MG capsule capsule Take 1 capsule by mouth Daily.      oxybutynin (DITROPAN) 5 MG tablet Take 1 tablet by mouth 2 (Two) Times a Day.      senna-docusate (PERICOLACE) 8.6-50 MG per tablet Take 2 tablets by mouth.      simvastatin (ZOCOR) 20 MG tablet Take 1 tablet by mouth Daily.      vitamin E 400 UNIT capsule Take 1  capsule by mouth.       No Known Allergies    Objective    Objective     Vital Signs  Temp:  [97.8 °F (36.6 °C)-98.2 °F (36.8 °C)] 98.1 °F (36.7 °C)  Heart Rate:  [56-67] 67  Resp:  [16-18] 18  BP: (108-177)/(51-93) 140/63  SpO2:  [89 %-100 %] 100 %  on  Flow (L/min):  [1-2] 1;   Device (Oxygen Therapy): nasal cannula  Body mass index is 27.88 kg/m².    Physical Exam  Constitutional:       General: She is not in acute distress.     Appearance: She is not ill-appearing or toxic-appearing.   HENT:      Head: Normocephalic.      Comments: Large red/purple raised bruised contusion on right side of posterior scalp, tender to touch, about half the size of a golf ball     Nose: Nose normal.      Mouth/Throat:      Mouth: Mucous membranes are moist.   Eyes:      Extraocular Movements: Extraocular movements intact.      Conjunctiva/sclera: Conjunctivae normal.      Pupils: Pupils are equal, round, and reactive to light.      Comments: Wearing glasses   Cardiovascular:      Rate and Rhythm: Normal rate and regular rhythm.      Pulses: Normal pulses.      Heart sounds: No murmur heard.  Pulmonary:      Effort: Pulmonary effort is normal. No respiratory distress.      Breath sounds: Normal breath sounds.   Abdominal:      General: Bowel sounds are normal. There is no distension.      Palpations: Abdomen is soft.      Tenderness: There is no abdominal tenderness.   Musculoskeletal:         General: No swelling or tenderness. Normal range of motion.      Cervical back: Normal range of motion and neck supple.      Comments: Appears generally weak with decreased power, symmetrical strength and movements to upper and lower extremities.   Skin:     General: Skin is warm and dry.      Coloration: Skin is not jaundiced or pale.   Neurological:      General: No focal deficit present.      Mental Status: She is alert.      Comments: Speech clear, face symmetrical, tongue midline   Psychiatric:      Comments: Cooperative         Results  Review:  I reviewed the patient's new clinical results.      Lab Results (last 24 hours)       Procedure Component Value Units Date/Time    CBC & Differential [[id number]]  (Abnormal) Collected: 06/28/24 2319    Specimen: Blood Updated: 06/28/24 2337    Narrative:      The following orders were created for panel order CBC & Differential.  Procedure                               Abnormality         Status                     ---------                               -----------         ------                     CBC Auto Differential[[id number]]        Abnormal            Final result                 Please view results for these tests on the individual orders.    Comprehensive Metabolic Panel [[id number]]  (Abnormal) Collected: 06/28/24 2319    Specimen: Blood Updated: 06/29/24 0036     Glucose 129 mg/dL      BUN 22 mg/dL      Creatinine 0.83 mg/dL      Sodium 139 mmol/L      Potassium 4.5 mmol/L      Chloride 101 mmol/L      CO2 24.7 mmol/L      Calcium 9.8 mg/dL      Total Protein 6.6 g/dL      Albumin 4.2 g/dL      ALT (SGPT) 14 U/L      AST (SGOT) 11 U/L      Alkaline Phosphatase 42 U/L      Total Bilirubin 0.4 mg/dL      Globulin 2.4 gm/dL      A/G Ratio 1.8 g/dL      BUN/Creatinine Ratio 26.5     Anion Gap 13.3 mmol/L      eGFR 65.8 mL/min/1.73     Narrative:      GFR Normal >60  Chronic Kidney Disease <60  Kidney Failure <15    The GFR formula is only valid for adults with stable renal function between ages 18 and 70.    CBC Auto Differential [[id number]]  (Abnormal) Collected: 06/28/24 2319    Specimen: Blood Updated: 06/28/24 2337     WBC 16.04 10*3/mm3      RBC 4.37 10*6/mm3      Hemoglobin 14.1 g/dL      Hematocrit 43.1 %      MCV 98.6 fL      MCH 32.3 pg      MCHC 32.7 g/dL      RDW 12.0 %      RDW-SD 43.8 fl      MPV 10.0 fL      Platelets 230 10*3/mm3      Neutrophil % 79.0 %      Lymphocyte % 11.7 %      Monocyte % 7.4 %      Eosinophil % 0.8 %      Basophil % 0.4 %      Immature Grans % 0.7 %       Neutrophils, Absolute 12.66 10*3/mm3      Lymphocytes, Absolute 1.87 10*3/mm3      Monocytes, Absolute 1.19 10*3/mm3      Eosinophils, Absolute 0.13 10*3/mm3      Basophils, Absolute 0.07 10*3/mm3      Immature Grans, Absolute 0.12 10*3/mm3      nRBC 0.0 /100 WBC     High Sensitivity Troponin T [[id number]]  (Abnormal) Collected: 06/28/24 2319    Specimen: Blood Updated: 06/29/24 0058     HS Troponin T 23 ng/L     Narrative:      High Sensitive Troponin T Reference Range:  <14.0 ng/L- Negative Female for AMI  <22.0 ng/L- Negative Male for AMI  >=14 - Abnormal Female indicating possible myocardial injury.  >=22 - Abnormal Male indicating possible myocardial injury.   Clinicians would have to utilize clinical acumen, EKG, Troponin, and serial changes to determine if it is an Acute Myocardial Infarction or myocardial injury due to an underlying chronic condition.         BNP [[id number]]  (Normal) Collected: 06/28/24 2319    Specimen: Blood Updated: 06/29/24 0058     proBNP 193.0 pg/mL     Narrative:      This assay is used as an aid in the diagnosis of individuals suspected of having heart failure. It can be used as an aid in the diagnosis of acute decompensated heart failure (ADHF) in patients presenting with signs and symptoms of ADHF to the emergency department (ED). In addition, NT-proBNP of <300 pg/mL indicates ADHF is not likely.    Age Range Result Interpretation  NT-proBNP Concentration (pg/mL:      <50             Positive            >450                   Gray                 300-450                    Negative             <300    50-75           Positive            >900                  Gray                300-900                  Negative            <300      >75             Positive            >1800                  Gray                300-1800                  Negative            <300    Urinalysis With Microscopic If Indicated (No Culture) - Urine, Clean Catch [[id number]]  (Abnormal) Collected: 06/28/24  2346    Specimen: Urine, Clean Catch Updated: 06/29/24 0018     Color, UA Yellow     Appearance, UA Clear     pH, UA 7.0     Specific Gravity, UA 1.018     Glucose, UA Negative     Ketones, UA Negative     Bilirubin, UA Negative     Blood, UA Trace     Protein, UA Negative     Leuk Esterase, UA Small (1+)     Nitrite, UA Positive     Urobilinogen, UA 1.0 E.U./dL    Urinalysis, Microscopic Only - Urine, Clean Catch [[id number]]  (Abnormal) Collected: 06/28/24 2346    Specimen: Urine, Clean Catch Updated: 06/29/24 0018     RBC, UA 0-2 /HPF      WBC, UA 21-50 /HPF      Bacteria, UA 4+ /HPF      Squamous Epithelial Cells, UA 0-2 /HPF      Hyaline Casts, UA None Seen /LPF      Methodology Automated Microscopy    Urine Culture - Urine, Urine, Clean Catch [[id number]] Collected: 06/28/24 2346    Specimen: Urine, Clean Catch Updated: 06/29/24 0738    Lactic Acid, Plasma [[id number]]  (Normal) Collected: 06/29/24 0045    Specimen: Blood Updated: 06/29/24 0114     Lactate 1.2 mmol/L     High Sensitivity Troponin T 2Hr [[id number]]  (Abnormal) Collected: 06/29/24 0123    Specimen: Blood Updated: 06/29/24 0156     HS Troponin T 23 ng/L      Troponin T Delta 0 ng/L     Narrative:      High Sensitive Troponin T Reference Range:  <14.0 ng/L- Negative Female for AMI  <22.0 ng/L- Negative Male for AMI  >=14 - Abnormal Female indicating possible myocardial injury.  >=22 - Abnormal Male indicating possible myocardial injury.   Clinicians would have to utilize clinical acumen, EKG, Troponin, and serial changes to determine if it is an Acute Myocardial Infarction or myocardial injury due to an underlying chronic condition.         Basic Metabolic Panel [[id number]]  (Abnormal) Collected: 06/29/24 0536    Specimen: Blood Updated: 06/29/24 0623     Glucose 109 mg/dL      BUN 22 mg/dL      Creatinine 0.71 mg/dL      Sodium 136 mmol/L      Potassium 4.5 mmol/L      Chloride 101 mmol/L      CO2 23.3 mmol/L      Calcium 9.1 mg/dL       BUN/Creatinine Ratio 31.0     Anion Gap 11.7 mmol/L      eGFR 79.4 mL/min/1.73     Narrative:      GFR Normal >60  Chronic Kidney Disease <60  Kidney Failure <15    The GFR formula is only valid for adults with stable renal function between ages 18 and 70.    CBC (No Diff) [[id number]]  (Abnormal) Collected: 06/29/24 0536    Specimen: Blood Updated: 06/29/24 0604     WBC 10.56 10*3/mm3      RBC 3.93 10*6/mm3      Hemoglobin 12.8 g/dL      Hematocrit 38.7 %      MCV 98.5 fL      MCH 32.6 pg      MCHC 33.1 g/dL      RDW 11.9 %      RDW-SD 43.3 fl      MPV 10.1 fL      Platelets 210 10*3/mm3     High Sensitivity Troponin T [[id number]]  (Abnormal) Collected: 06/29/24 0536    Specimen: Blood Updated: 06/29/24 0623     HS Troponin T 22 ng/L     Narrative:      High Sensitive Troponin T Reference Range:  <14.0 ng/L- Negative Female for AMI  <22.0 ng/L- Negative Male for AMI  >=14 - Abnormal Female indicating possible myocardial injury.  >=22 - Abnormal Male indicating possible myocardial injury.   Clinicians would have to utilize clinical acumen, EKG, Troponin, and serial changes to determine if it is an Acute Myocardial Infarction or myocardial injury due to an underlying chronic condition.                 Imaging Results (Last 24 Hours)       Procedure Component Value Units Date/Time    CT Soft Tissue Neck With Contrast [[id number]] Collected: 06/29/24 0443     Updated: 06/29/24 0443    Narrative:        Patient: CHARLENE GARCIA  Time Out: 04:42  Exam(s): CT NECK With Contrast     EXAM:    CT Neck With Intravenous Contrast    CLINICAL HISTORY:     Reason for exam: upper airway stridor.    TECHNIQUE:    Axial computed tomography images of the neck with intravenous contrast.    CTDI is 13.48 mGy and DLP is 330.7 mGy-cm.  This CT exam was performed   according to the principle of ALARA (As Low As Reasonably Achievable) by   using one or more of the following dose reduction techniques: automated   exposure control, adjustment of  the mA and or kV according to patient   size, and or use of iterative reconstruction technique.    COMPARISON:    No relevant prior studies available.    FINDINGS:    Oropharynx:  Unremarkable.  No significant tonsillar enlargement.  No   peritonsillar abscess.    Hypopharynx:  Unremarkable.    Larynx:  Unremarkable.  Normal epiglottis.    Trachea:  Unremarkable.    Retropharyngeal space:  Unremarkable.    Submandibular parotid glands:  Unremarkable.  Glands are normal in size.      Thyroid:  Unremarkable.  No enlarged or calcified nodules.    Bones joints:  Degenerative changes in the spine.  No acute fracture.    Soft tissues:  Unremarkable.    Vasculature:  Atherosclerotic disease.    Lymph nodes:  Unremarkable.  No lymphadenopathy.    Dental:  Motion artifact and dental amalgam mildly limit evaluation.    Lung apices:  Unremarkable as visualized.    Other findings:  Patent airway.    IMPRESSION:       1.  Motion artifact and dental amalgam mildly limit evaluation.  2.  No acute findings on CT neck.      Impression:          Electronically signed by Malvin Zayas MD on 06-29-24 at 0442    XR Chest 1 View [[id number]] Collected: 06/29/24 0103     Updated: 06/29/24 0103    Narrative:        Patient: CHARLENE GARCIA  Time Out: 01:03  Exam(s): XR CXR 1 VIEW     EXAM:    XR Chest, 1 View    CLINICAL HISTORY:     Reason for exam: Fall, shortness of breath.    TECHNIQUE:    Frontal view of the chest.    COMPARISON:    No previous studies.    FINDINGS:    Lungs:  Unremarkable.  No consolidation.    Pleural space:  Unremarkable.  No pneumothorax.    Heart:  Heart is normal in size.  No cardiomegaly.    Mediastinum:  Unremarkable.  Normal mediastinal contour.    Bones joints:  Osteopenia.  No acute fracture.    Vasculature:  Atherosclerotic disease.    Other findings:  Hypoaeration.    IMPRESSION:       1.  Marked hypoaeration.  2.  Atherosclerotic disease.  3.  No consolidative changes or pleural effusions.  4.   Osteopenia.      Impression:          Electronically signed by Eliseo Ventura MD on 06-29-24 at 0103    CT Head Without Contrast [[id number]] Collected: 06/28/24 2250     Updated: 06/28/24 2305    Narrative:      CT HEAD AND CERVICAL SPINE WITHOUT CONTRAST     CLINICAL HISTORY: Fall. Hit the back of her head. Neck is sore all over     TECHNIQUE: CT scan of the head was obtained with 3 mm axial soft tissue  and 2 mm bone algorithm images. No intravenous contrast was  administered. Sagittal and coronal reconstructions were obtained.     COMPARISON: None.     FINDINGS:       There is no evidence for an acute extra-axial hemorrhage or a calvarial  fracture.     Moderate changes of chronic small vessel ischemic phenomena are noted.  The ventricles, sulci, and cisterns are age-appropriate. The gray-white  matter differentiation is within normal limits. The basal ganglia and  thalami are unremarkable in appearance. The posterior fossa structures  are within normal limits.     Incidental note is made of a small to moderate size right parietal scalp  hematoma.       Impression:         No evidence for acute traumatic intracranial pathology.        TECHNIQUE: CT scan of the cervical spine was obtained with 1 mm axial  bone algorithm and 2 mm axial soft tissue algorithm images. Sagittal and  coronal reconstructed images were obtained.     FINDINGS:     There is no evidence for acute fracture or bony malalignment involving  the cervical spine.     Incidental degenerative phenomena are appreciated within the cervical  spine with disc osteophyte complexes at C5-6 and C6 resulting in up to  moderate degrees of stenoses. Multilevel foraminal stenotic changes are  also noted from C3-4 down to C6-7.     IMPRESSION:     No evidence for acute fracture or bony malalignment involving the  cervical spine.     Incidental degenerative phenomena as discussed above.           Radiation dose reduction techniques were utilized, including  automated  exposure control and exposure modulation based on body size.     This report was finalized on 6/28/2024 11:02 PM by Dr. Anil Hernandez M.D  on Workstation: [id number]       CT Cervical Spine Without Contrast [[id number]] Collected: 06/28/24 2250     Updated: 06/28/24 2305    Narrative:      CT HEAD AND CERVICAL SPINE WITHOUT CONTRAST     CLINICAL HISTORY: Fall. Hit the back of her head. Neck is sore all over     TECHNIQUE: CT scan of the head was obtained with 3 mm axial soft tissue  and 2 mm bone algorithm images. No intravenous contrast was  administered. Sagittal and coronal reconstructions were obtained.     COMPARISON: None.     FINDINGS:       There is no evidence for an acute extra-axial hemorrhage or a calvarial  fracture.     Moderate changes of chronic small vessel ischemic phenomena are noted.  The ventricles, sulci, and cisterns are age-appropriate. The gray-white  matter differentiation is within normal limits. The basal ganglia and  thalami are unremarkable in appearance. The posterior fossa structures  are within normal limits.     Incidental note is made of a small to moderate size right parietal scalp  hematoma.       Impression:         No evidence for acute traumatic intracranial pathology.        TECHNIQUE: CT scan of the cervical spine was obtained with 1 mm axial  bone algorithm and 2 mm axial soft tissue algorithm images. Sagittal and  coronal reconstructed images were obtained.     FINDINGS:     There is no evidence for acute fracture or bony malalignment involving  the cervical spine.     Incidental degenerative phenomena are appreciated within the cervical  spine with disc osteophyte complexes at C5-6 and C6 resulting in up to  moderate degrees of stenoses. Multilevel foraminal stenotic changes are  also noted from C3-4 down to C6-7.     IMPRESSION:     No evidence for acute fracture or bony malalignment involving the  cervical spine.     Incidental degenerative phenomena as  discussed above.           Radiation dose reduction techniques were utilized, including automated  exposure control and exposure modulation based on body size.     This report was finalized on 6/28/2024 11:02 PM by Dr. Anil Hernandez M.D  on Workstation: [id number]                   ECG 12 Lead Dyspnea   Preliminary Result   HEART RATE=60  bpm   RR Umjyjmtk=5065  ms   MS Bpzdjfvo=858  ms   P Horizontal Axis=-18  deg   P Front Axis=41  deg   QRSD Interval=86  ms   QT Ivjecbbk=184  ms   UWfV=674  ms   QRS Axis=-9  deg   T Wave Axis=36  deg   - OTHERWISE NORMAL ECG -   Sinus rhythm   Abnormal R-wave progression, early transition   Date and Time of Study:2024-06-29 00:48:18      Telemetry Scan   Final Result      Telemetry Scan   Final Result           Assessment/Plan     Active Hospital Problems    Diagnosis  POA    **UTI (urinary tract infection) [N39.0]  Yes    Dementia [F03.90]  Unknown    Hypothyroidism (acquired) [E03.9]  Unknown    CKD (chronic kidney disease) [N18.9]  Unknown    Fall [W19.XXXA]  Unknown    Hyperlipidemia [E78.5]  Unknown      Resolved Hospital Problems   No resolved problems to display.     White count 16 initially, down to 10 after Rocephin.  Lactate is 1.2.  UA positive for nitrites, leukocytes, WBCs and bacteria.  Does not seem like she is symptomatic, however not the best historian and we will continue the Rocephin for now and follow cultures.  Continue Ditropan.    Sustained contusion on the back of her head from this fall.  Likely mechanical as she denies any concerns.  I will discontinue the aspirin given at 89 risks outweigh the benefits.  Consult PT/OT/CCP.  Noted frequent falls in the past and suspicion for orthostatic hypotension.  Metoprolol has been held due to unsure of frequency and in consideration of her current heart rate and blood pressure.  Nursing is attempting to clarify this medication with her assisted living facility.    Check TSH with reflex free T4.  Resume  levothyroxine 75 mcg daily.    Check lipid panel in the morning.  More than likely she does not need the statin as well even if her lipid panel is abnormal, risk versus benefits at 89 considered I would more than likely stop the statin and as many medications as possible to reduce further fall risk for her.      VTE Prophylaxis - SCDs.  Code Status - Full code.       SONDRA Garcia  [facility]  06/29/24  16:28 EDT

## 2024-06-29 NOTE — ED NOTES
Nursing report ED to floor  Marianna Terrell  89 y.o.  female    HPI :  HPI (Adult)  Stated Reason for Visit: Pt presents to ED via [facility] from [facility] after an unwitnessed fall in her apartment. Per staff, pt is confused but at triage pt is A&O x4. Pt has a contusion on back of head, denies LOC and EMS denies anticoagulants per med list.  History Obtained From: patient, EMS    Marianna Terrell is a 89 y.o. female with a medical history of anxiety, hypothyroidism, hearing loss, dementia who presents to the ED c/o acute fall.  The patient reports she had a fall in her apartment.  EMS reports it was unwitnessed.  She struck the back of her head.  She is not on blood thinners.  She denies any neck or back pain.  She states she remembers falling.       Chief Complaint  Chief Complaint   Patient presents with    Fall       Admitting doctor:   Eros Dalton MD    Admitting diagnosis:   The primary encounter diagnosis was Acute cystitis without hematuria. Diagnoses of Closed head injury, initial encounter and Expiratory stridor were also pertinent to this visit.    Code status:   Current Code Status       Date Active Code Status Order ID Comments User Context       Not on file            Allergies:   Patient has no known allergies.    Isolation:   No active isolations    Intake and Output    Intake/Output Summary (Last 24 hours) at 6/29/2024 0235  Last data filed at 6/29/2024 0122  Gross per 24 hour   Intake 100 ml   Output --   Net 100 ml       Weight:       06/28/24  2133   Weight: 56.7 kg (125 lb)       Most recent vitals:   Vitals:    06/29/24 0001 06/29/24 0044 06/29/24 0101 06/29/24 0201   BP: 113/57  117/51 108/56   BP Location:       Patient Position:       Pulse: 57 64 57 56   Resp:       Temp:       TempSrc:       SpO2: 90% (!) 89% 93% 92%   Weight:       Height:           Active LDAs/IV Access:   Lines, Drains & Airways       Active LDAs       Name Placement date Placement time Site Days     Peripheral IV 06/28/24 2129 Anterior;Left Forearm 06/28/24 2129  Forearm  less than 1                    Labs (abnormal labs have a star):   Labs Reviewed   COMPREHENSIVE METABOLIC PANEL - Abnormal; Notable for the following components:       Result Value    Glucose 129 (*)     Calcium 9.8 (*)     BUN/Creatinine Ratio 26.5 (*)     All other components within normal limits    Narrative:     GFR Normal >60  Chronic Kidney Disease <60  Kidney Failure <15    The GFR formula is only valid for adults with stable renal function between ages 18 and 70.   URINALYSIS W/ MICROSCOPIC IF INDICATED (NO CULTURE) - Abnormal; Notable for the following components:    Blood, UA Trace (*)     Leuk Esterase, UA Small (1+) (*)     Nitrite, UA Positive (*)     All other components within normal limits   CBC WITH AUTO DIFFERENTIAL - Abnormal; Notable for the following components:    WBC 16.04 (*)     MCV 98.6 (*)     RDW 12.0 (*)     Neutrophil % 79.0 (*)     Lymphocyte % 11.7 (*)     Immature Grans % 0.7 (*)     Neutrophils, Absolute 12.66 (*)     Monocytes, Absolute 1.19 (*)     Immature Grans, Absolute 0.12 (*)     All other components within normal limits   URINALYSIS, MICROSCOPIC ONLY - Abnormal; Notable for the following components:    WBC, UA 21-50 (*)     Bacteria, UA 4+ (*)     All other components within normal limits   TROPONIN - Abnormal; Notable for the following components:    HS Troponin T 23 (*)     All other components within normal limits    Narrative:     High Sensitive Troponin T Reference Range:  <14.0 ng/L- Negative Female for AMI  <22.0 ng/L- Negative Male for AMI  >=14 - Abnormal Female indicating possible myocardial injury.  >=22 - Abnormal Male indicating possible myocardial injury.   Clinicians would have to utilize clinical acumen, EKG, Troponin, and serial changes to determine if it is an Acute Myocardial Infarction or myocardial injury due to an underlying chronic condition.        HIGH SENSITIVITIY TROPONIN  T 2HR - Abnormal; Notable for the following components:    HS Troponin T 23 (*)     All other components within normal limits    Narrative:     High Sensitive Troponin T Reference Range:  <14.0 ng/L- Negative Female for AMI  <22.0 ng/L- Negative Male for AMI  >=14 - Abnormal Female indicating possible myocardial injury.  >=22 - Abnormal Male indicating possible myocardial injury.   Clinicians would have to utilize clinical acumen, EKG, Troponin, and serial changes to determine if it is an Acute Myocardial Infarction or myocardial injury due to an underlying chronic condition.        LACTIC ACID, PLASMA - Normal   BNP (IN-HOUSE) - Normal    Narrative:     This assay is used as an aid in the diagnosis of individuals suspected of having heart failure. It can be used as an aid in the diagnosis of acute decompensated heart failure (ADHF) in patients presenting with signs and symptoms of ADHF to the emergency department (ED). In addition, NT-proBNP of <300 pg/mL indicates ADHF is not likely.    Age Range Result Interpretation  NT-proBNP Concentration (pg/mL:      <50             Positive            >450                   Gray                 300-450                    Negative             <300    50-75           Positive            >900                  Gray                300-900                  Negative            <300      >75             Positive            >1800                  Gray                300-1800                  Negative            <300   URINALYSIS W/ CULTURE IF INDICATED   CBC AND DIFFERENTIAL    Narrative:     The following orders were created for panel order CBC & Differential.  Procedure                               Abnormality         Status                     ---------                               -----------         ------                     CBC Auto Differential[[id number]]        Abnormal            Final result                 Please view results for these tests on the individual orders.        EKG:   ECG 12 Lead Dyspnea   Preliminary Result   HEART RATE=60  bpm   RR Ulzrbjex=5052  ms   MI Jkywvthc=491  ms   P Horizontal Axis=-18  deg   P Front Axis=41  deg   QRSD Interval=86  ms   QT Mkrwhogk=696  ms   KPzD=561  ms   QRS Axis=-9  deg   T Wave Axis=36  deg   - OTHERWISE NORMAL ECG -   Sinus rhythm   Abnormal R-wave progression, early transition   Date and Time of Study:2024-06-29 00:48:18          Meds given in ED:   Medications   albuterol (PROVENTIL) nebulizer solution 0.083% 2.5 mg/3mL (2.5 mg Nebulization Given 6/28/24 2347)   cefTRIAXone (ROCEPHIN) 1,000 mg in sodium chloride 0.9 % 100 mL MBP (0 mg Intravenous Stopped 6/29/24 0122)   iopamidol (ISOVUE-300) 61 % injection 75 mL (75 mL Intravenous Given 6/29/24 0132)       Imaging results:  XR Chest 1 View    Result Date: 6/29/2024  Electronically signed by Eliseo Ventura MD on 06-29-24 at 0103    CT Head Without Contrast    Result Date: 6/28/2024   No evidence for acute traumatic intracranial pathology.   TECHNIQUE: CT scan of the cervical spine was obtained with 1 mm axial bone algorithm and 2 mm axial soft tissue algorithm images. Sagittal and coronal reconstructed images were obtained.  FINDINGS:  There is no evidence for acute fracture or bony malalignment involving the cervical spine.  Incidental degenerative phenomena are appreciated within the cervical spine with disc osteophyte complexes at C5-6 and C6 resulting in up to moderate degrees of stenoses. Multilevel foraminal stenotic changes are also noted from C3-4 down to C6-7.  IMPRESSION:  No evidence for acute fracture or bony malalignment involving the cervical spine.  Incidental degenerative phenomena as discussed above.    Radiation dose reduction techniques were utilized, including automated exposure control and exposure modulation based on body size.  This report was finalized on 6/28/2024 11:02 PM by Dr. Anil Hernandez M.D on Workstation: [id number]      CT Cervical Spine  Without Contrast    Result Date: 6/28/2024   No evidence for acute traumatic intracranial pathology.   TECHNIQUE: CT scan of the cervical spine was obtained with 1 mm axial bone algorithm and 2 mm axial soft tissue algorithm images. Sagittal and coronal reconstructed images were obtained.  FINDINGS:  There is no evidence for acute fracture or bony malalignment involving the cervical spine.  Incidental degenerative phenomena are appreciated within the cervical spine with disc osteophyte complexes at C5-6 and C6 resulting in up to moderate degrees of stenoses. Multilevel foraminal stenotic changes are also noted from C3-4 down to C6-7.  IMPRESSION:  No evidence for acute fracture or bony malalignment involving the cervical spine.  Incidental degenerative phenomena as discussed above.    Radiation dose reduction techniques were utilized, including automated exposure control and exposure modulation based on body size.  This report was finalized on 6/28/2024 11:02 PM by Dr. Anil Hernandez M.D on Workstation: [id number]       Ambulatory status:   - ax2, normally ambulates c wheelchair     Social issues:   Social History     Socioeconomic History    Marital status: Other   Tobacco Use    Smoking status: Never    Smokeless tobacco: Never   Substance and Sexual Activity    Alcohol use: Never    Drug use: Never    Sexual activity: Not Currently       Peripheral Neurovascular  Peripheral Neurovascular (Adult)  Peripheral Neurovascular WDL: WDL    Neuro Cognitive  Neuro Cognitive (Adult)  Cognitive/Neuro/Behavioral WDL: WDL    Learning  Learning Assessment (Adult)  Learning Readiness and Ability: cognitive limitation noted    Respiratory  Respiratory WDL  Respiratory WDL: .WDL except  Cough Frequency: no cough  Breath Sounds  Breath Sounds: All Fields  All Lung Fields Breath Sounds: Anterior:, stridor, expiratory  Breath Sounds Post-Respiratory Treatment  Breath Sounds Posttreatment All Fields: All Fields  Breath Sounds  Posttreatment All Fields: Anterior:, aeration increased    Abdominal Pain       Pain Assessments  Pain (Adult)  (0-10) Pain Rating: Rest: 0  (0-10) Pain Rating: Activity: 0    NIH Stroke Scale       Zoila Nunn RN  06/29/24 02:35 EDT     Call [facility]ila #[phone] with any questions

## 2024-06-30 LAB
ANION GAP SERPL CALCULATED.3IONS-SCNC: 8 MMOL/L (ref 5–15)
BUN SERPL-MCNC: 29 MG/DL (ref 8–23)
BUN/CREAT SERPL: 34.1 (ref 7–25)
CALCIUM SPEC-SCNC: 9.1 MG/DL (ref 8.2–9.6)
CHLORIDE SERPL-SCNC: 104 MMOL/L (ref 98–107)
CO2 SERPL-SCNC: 26 MMOL/L (ref 22–29)
CREAT SERPL-MCNC: 0.85 MG/DL (ref 0.57–1)
DEPRECATED RDW RBC AUTO: 42.6 FL (ref 37–54)
EGFRCR SERPLBLD CKD-EPI 2021: 64 ML/MIN/1.73
ERYTHROCYTE [DISTWIDTH] IN BLOOD BY AUTOMATED COUNT: 11.8 % (ref 12.3–15.4)
GLUCOSE SERPL-MCNC: 102 MG/DL (ref 65–99)
HCT VFR BLD AUTO: 38.3 % (ref 34–46.6)
HGB BLD-MCNC: 12.5 G/DL (ref 12–15.9)
MCH RBC QN AUTO: 32.2 PG (ref 26.6–33)
MCHC RBC AUTO-ENTMCNC: 32.6 G/DL (ref 31.5–35.7)
MCV RBC AUTO: 98.7 FL (ref 79–97)
PLATELET # BLD AUTO: 194 10*3/MM3 (ref 140–450)
PMV BLD AUTO: 10 FL (ref 6–12)
POTASSIUM SERPL-SCNC: 4.5 MMOL/L (ref 3.5–5.2)
RBC # BLD AUTO: 3.88 10*6/MM3 (ref 3.77–5.28)
SODIUM SERPL-SCNC: 138 MMOL/L (ref 136–145)
WBC NRBC COR # BLD AUTO: 9.74 10*3/MM3 (ref 3.4–10.8)

## 2024-06-30 PROCEDURE — 80048 BASIC METABOLIC PNL TOTAL CA: CPT | Performed by: STUDENT IN AN ORGANIZED HEALTH CARE EDUCATION/TRAINING PROGRAM

## 2024-06-30 PROCEDURE — 97110 THERAPEUTIC EXERCISES: CPT

## 2024-06-30 PROCEDURE — 25010000002 CEFTRIAXONE PER 250 MG: Performed by: NURSE PRACTITIONER

## 2024-06-30 PROCEDURE — 97162 PT EVAL MOD COMPLEX 30 MIN: CPT

## 2024-06-30 PROCEDURE — 85027 COMPLETE CBC AUTOMATED: CPT | Performed by: STUDENT IN AN ORGANIZED HEALTH CARE EDUCATION/TRAINING PROGRAM

## 2024-06-30 RX ORDER — ASPIRIN 81 MG/1
81 TABLET ORAL DAILY
Status: DISCONTINUED | OUTPATIENT
Start: 2024-06-30 | End: 2024-07-03 | Stop reason: HOSPADM

## 2024-06-30 RX ADMIN — Medication 10 ML: at 09:28

## 2024-06-30 RX ADMIN — METOPROLOL TARTRATE 25 MG: 25 TABLET, FILM COATED ORAL at 08:42

## 2024-06-30 RX ADMIN — ASPIRIN 81 MG: 81 TABLET, COATED ORAL at 08:42

## 2024-06-30 RX ADMIN — ESCITALOPRAM 20 MG: 20 TABLET, FILM COATED ORAL at 08:42

## 2024-06-30 RX ADMIN — OXYBUTYNIN CHLORIDE 5 MG: 5 TABLET ORAL at 08:42

## 2024-06-30 RX ADMIN — METOPROLOL TARTRATE 25 MG: 25 TABLET, FILM COATED ORAL at 20:42

## 2024-06-30 RX ADMIN — Medication 500 MG: at 08:42

## 2024-06-30 RX ADMIN — ATORVASTATIN CALCIUM 10 MG: 20 TABLET, FILM COATED ORAL at 08:42

## 2024-06-30 RX ADMIN — Medication 10 ML: at 20:43

## 2024-06-30 RX ADMIN — CEFTRIAXONE SODIUM 1000 MG: 1 INJECTION, POWDER, FOR SOLUTION INTRAMUSCULAR; INTRAVENOUS at 23:11

## 2024-06-30 RX ADMIN — LEVOTHYROXINE SODIUM 75 MCG: 75 TABLET ORAL at 05:47

## 2024-06-30 RX ADMIN — OXYBUTYNIN CHLORIDE 5 MG: 5 TABLET ORAL at 20:42

## 2024-06-30 NOTE — THERAPY EVALUATION
Patient Name: Marianna Terrell  : 1931    MRN: [id number]                              Today's Date: 2024       Admit Date: 2024    Visit Dx:     ICD-10-CM ICD-9-CM   1. Acute cystitis without hematuria  N30.00 595.0   2. Closed head injury, initial encounter  S09.90XA 959.01   3. Expiratory stridor  R06.1 786.1     Patient Active Problem List   Diagnosis    UTI (urinary tract infection)    Dementia    Hypothyroidism (acquired)    CKD (chronic kidney disease)    Fall    Hyperlipidemia     Past Medical History:   Diagnosis Date    Anxiety     Chronic kidney disease (CKD), stage IV (severe)     Dementia     Difficulty walking     High cholesterol     HL (hearing loss)     Hypothyroid      Past Surgical History:   Procedure Laterality Date    CYSTECTOMY      HYSTERECTOMY      KNEE SURGERY        General Information       Row Name 24 1014          Physical Therapy Time and Intention    Document Type evaluation  -CS     Mode of Treatment physical therapy  -CS       Row Name 24 1014          General Information    Prior Level of Function independent:  -CS     Existing Precautions/Restrictions fall  -CS       Row Name 24 1014          Cognition    Orientation Status (Cognition) oriented x 3  -CS       Row Name 24 1014          Safety Issues, Functional Mobility    Impairments Affecting Function (Mobility) endurance/activity tolerance;strength;range of motion (ROM);postural/trunk control  -CS               User Key  (r) = Recorded By, (t) = Taken By, (c) = Cosigned By      Initials Name Provider Type    CS Jean Pierre Pagan, PT Physical Therapist                   Mobility       Row Name 24 1014          Bed Mobility    Bed Mobility supine-sit;sit-supine  -CS     Supine-Sit [place] (Bed Mobility) moderate assist (50% patient effort);verbal cues;nonverbal cues (demo/gesture)  -CS     Sit-Supine [place] (Bed Mobility) moderate assist (50% patient  effort);verbal cues;nonverbal cues (demo/gesture)  -CS       Row Name 06/30/24 1014          Bed-Chair Transfer    Bed-Chair [place] (Transfers) maximum assist (25% patient effort);verbal cues;nonverbal cues (demo/gesture)  -CS     Assistive Device (Bed-Chair Transfers) --  HHA  -CS       Row Name 06/30/24 1014          Sit-Stand Transfer    Sit-Stand [place] (Transfers) maximum assist (25% patient effort);verbal cues;nonverbal cues (demo/gesture)  -CS     Assistive Device (Sit-Stand Transfers) --  HHA  -CS               User Key  (r) = Recorded By, (t) = Taken By, (c) = Cosigned By      Initials Name Provider Type    Jean Pierre Maldonado, PT Physical Therapist                   Obj/Interventions       Row Name 06/30/24 1015          Strength Comprehensive (MMT)    Comment, General Manual Muscle Testing (MMT) Assessment >3/5 generally  -CS               User Key  (r) = Recorded By, (t) = Taken By, (c) = Cosigned By      Initials Name Provider Type    Jean Pierre Maldonado, PT Physical Therapist                   Goals/Plan       Row Name 06/30/24 1015          Bed Mobility Goal 1 (PT)    Activity/Assistive Device (Bed Mobility Goal 1, PT) bed mobility activities, all  -CS     [place] Level/Cues Needed (Bed Mobility Goal 1, PT) contact guard required  -CS     Time Frame (Bed Mobility Goal 1, PT) 1 week  -CS       Row Name 06/30/24 1015          Transfer Goal 1 (PT)    Activity/Assistive Device (Transfer Goal 1, PT) sit-to-stand/stand-to-sit;bed-to-chair/chair-to-bed  -CS     [place] Level/Cues Needed (Transfer Goal 1, PT) contact guard required  -CS       Row Name 06/30/24 1015          Gait Training Goal 1 (PT)    Activity/Assistive Device (Gait Training Goal 1, PT) assistive device use  -CS     [place] Level (Gait Training Goal 1, PT) contact guard required  -CS     Distance (Gait Training Goal 1, PT) 10  -CS     Time Frame (Gait Training Goal 1, PT) 1 week  -CS       Row Name 06/30/24 1015           Therapy Assessment/Plan (PT)    Planned Therapy Interventions (PT) balance training;bed mobility training;gait training;home exercise program;ROM (range of motion);strengthening;manual therapy techniques;stretching;neuromuscular re-education;transfer training  -CS               User Key  (r) = Recorded By, (t) = Taken By, (c) = Cosigned By      Initials Name Provider Type    Jean Pierre Maldonado, PT Physical Therapist                   Clinical Impression       Row Name 06/30/24 1015          Pain    Pretreatment Pain Rating 0/10 - no pain  -CS     Posttreatment Pain Rating 0/10 - no pain  -CS       Row Name 06/30/24 1015          Plan of Care Review    Plan of Care Reviewed With patient  -CS       [facility] Name 06/30/24 1015          Therapy Assessment/Plan (PT)    Patient/Family Therapy Goals Statement (PT) home  -CS     Rehab Potential (PT) good, to achieve stated therapy goals  -CS     Criteria for Skilled Interventions Met (PT) yes  -CS     Therapy Frequency (PT) 5 times/wk  -CS       Row Name 06/30/24 1015          Vital Signs    O2 Delivery Pre Treatment room air  -CS       Row Name 06/30/24 1015          Positioning and Restraints    Pre-Treatment Position in bed  -CS     Post Treatment Position chair  -CS     In Chair reclined;call light within reach;encouraged to call for assist;exit alarm on  -CS               User Key  (r) = Recorded By, (t) = Taken By, (c) = Cosigned By      Initials Name Provider Type    Jean Pierre Maldonado, PT Physical Therapist                   Outcome Measures       Row Name 06/30/24 1016 06/30/24 0842       How much help from another person do you currently need...    Turning from your back to your side while in flat bed without using bedrails? 3  -CS 3  -PC    Moving from lying on back to sitting on the side of a flat bed without bedrails? 3  -CS 3  -PC    Moving to and from a bed to a chair (including a wheelchair)? 2  -CS 3  -PC    Standing up from a chair using your arms  (e.g., wheelchair, bedside chair)? 2  -CS 2  -PC    Climbing 3-5 steps with a railing? 1  -CS 2  -PC    To walk in hospital room? 1  -CS 2  -PC    AM-PAC 6 Clicks Score (PT) 12  -CS 15  -PC    Highest Level of Mobility Goal 4 --> Transfer to chair/commode  -CS 4 --> Transfer to chair/commode  -PC      Row Name 06/30/24 1016          Functional Assessment    Outcome Measure Options AM-PAC 6 Clicks Basic Mobility (PT)  -CS               User Key  (r) = Recorded By, (t) = Taken By, (c) = Cosigned By      Initials Name Provider Type    CS Jean Pierre Pagan, PT Physical Therapist    Dawson Zambrano, RN Registered Nurse                                 Physical Therapy Education       Title: PT OT SLP Therapies (Done)       Topic: Physical Therapy (Done)       Point: Mobility training (Done)       Learning Progress Summary             Patient Acceptance, E,TB, VU,NR by  at 6/30/2024 1016                         Point: Home exercise program (Done)       Learning Progress Summary             Patient Acceptance, E,TB, VU,NR by  at 6/30/2024 1016                         Point: Body mechanics (Done)       Learning Progress Summary             Patient Acceptance, E,TB, VU,NR by  at 6/30/2024 1016                         Point: Precautions (Done)       Learning Progress Summary             Patient Acceptance, E,TB, VU,NR by  at 6/30/2024 1016                                         User Key       Initials Effective Dates Name Provider Type Discipline     07/11/23 -  Jean Pierre Pagan, PT Physical Therapist PT                  PT Recommendation and Plan  Planned Therapy Interventions (PT): balance training, bed mobility training, gait training, home exercise program, ROM (range of motion), strengthening, manual therapy techniques, stretching, neuromuscular re-education, transfer training  Plan of Care Reviewed With: patient     Time Calculation:         PT Charges       Row Name 06/30/24 1018             Time  Calculation    Start Time 0957  -CS      Stop Time 1015  -CS      Time Calculation (min) 18 min  -CS      PT Received On 06/30/24  -CS      PT - Next Appointment 07/01/24  -CS      PT Goal Re-Cert Due Date 07/07/24  -                User Key  (r) = Recorded By, (t) = Taken By, (c) = Cosigned By      Initials Name Provider Type    CS Ej, Jean Pierre HAWK, PT Physical Therapist                  Therapy Charges for Today       Code Description Service Date Service Provider Modifiers Qty    81180400711 HC PT EVAL MOD COMPLEXITY 2 6/30/2024 Jean Pierre Pagan, PT GP 1    26074563899 HC PT THER PROC EA 15 MIN 6/30/2024 Jean Pierre Pagan, PT GP 1            PT G-Codes  Outcome Measure Options: AM-PAC 6 Clicks Basic Mobility (PT)  AM-PAC 6 Clicks Score (PT): 12  PT Discharge Summary  Anticipated Discharge Disposition (PT): skilled nursing facility    Jean Pierre Pagan PT  6/30/2024

## 2024-06-30 NOTE — PLAN OF CARE
Goal Outcome Evaluation:  Plan of Care Reviewed With: patient        Progress: improving  Outcome Evaluation: SR/SB, room air, A/Ox4. Brief and purewick r/t incontinence. TTE per MD orders, to be completed. Discharge planning to be evaluated pending medical clearance.

## 2024-06-30 NOTE — PLAN OF CARE
Goal Outcome Evaluation:              Outcome Evaluation: VSS.NSR on monitor,O2 2L NC, compliant with plan of care,will continue supportive care this shift.

## 2024-06-30 NOTE — PLAN OF CARE
Goal Outcome Evaluation:  Plan of Care Reviewed With: patient      Pt admitted after a fall, presents with weakness. She does not have complaints of pain from the fall. This visit she was able to perform a bed>chair transfer needing maxA, pt very fearful of falling. Pt will benefit from exercises and strengthening. She anticipates snf at discharge.             Anticipated Discharge Disposition (PT): skilled nursing facility

## 2024-06-30 NOTE — PROGRESS NOTES
Name: Marianna Terrell ADMIT: 2024   : 1931  PCP: Maya Brizuela APRN    MRN: [id number] LOS: 0 days   AGE/SEX: 89 y.o. female  ROOM: [facility]     Subjective   Subjective   Patient sitting up in her recliner. Says she tried to work with PT this morning but couldn't get herself up. Says she felt weak/like she may fall- did not have any pain.        Objective   Objective   Vital Signs  Temp:  [97.3 °F (36.3 °C)-98.3 °F (36.8 °C)] 98 °F (36.7 °C)  Heart Rate:  [62-75] 62  Resp:  [16-18] 16  BP: (120-166)/(52-89) 121/64  SpO2:  [93 %-100 %] 93 %  on  Flow (L/min):  [1] 1;   Device (Oxygen Therapy): room air  Body mass index is 27.88 kg/m².  Physical Exam  Constitutional:       General: She is not in acute distress.     Appearance: She is not ill-appearing.      Comments: Elderly, frail   Cardiovascular:      Rate and Rhythm: Normal rate and regular rhythm.      Heart sounds: Murmur heard.   Pulmonary:      Effort: Pulmonary effort is normal. No respiratory distress.   Abdominal:      General: Abdomen is flat.      Palpations: Abdomen is soft.      Tenderness: There is no abdominal tenderness.   Musculoskeletal:         General: No swelling or tenderness.      Right lower leg: No edema.      Left lower leg: No edema.   Skin:     General: Skin is warm and dry.   Neurological:      General: No focal deficit present.      Mental Status: She is alert and oriented to person, place, and time. Mental status is at baseline.      Comments: Pleasantly forgetful         Results Review     I reviewed the patient's new clinical results.  Results from last 7 days   Lab Units 24  0515 24  0536 24  2319   WBC 10*3/mm3 9.74 10.56 16.04*   HEMOGLOBIN g/dL 12.5 12.8 14.1   PLATELETS 10*3/mm3 194 210 230     Results from last 7 days   Lab Units 24  0514 24  0536 24  2319   SODIUM mmol/L 138 136 139   POTASSIUM mmol/L 4.5 4.5 4.5   CHLORIDE mmol/L 104 101 101   CO2 mmol/L 26.0 23.3 24.7   BUN  "mg/dL 29* 22 22   CREATININE mg/dL 0.85 0.71 0.83   GLUCOSE mg/dL 102* 109* 129*   Estimated Creatinine Clearance: 40.7 mL/min (by C-G formula based on SCr of 0.85 mg/dL).  Results from last 7 days   Lab Units 06/28/24  2319   ALBUMIN g/dL 4.2   BILIRUBIN mg/dL 0.4   ALK PHOS U/L 42   AST (SGOT) U/L 11   ALT (SGPT) U/L 14     Results from last 7 days   Lab Units 06/30/24  0514 06/29/24  0536 06/28/24  2319   CALCIUM mg/dL 9.1 9.1 9.8*   ALBUMIN g/dL  --   --  4.2     Results from last 7 days   Lab Units 06/29/24  0045   LACTATE mmol/L 1.2   No results found for: \"COVID19\"  No results found for: \"HGBA1C\", \"POCGLU\"    CT Soft Tissue Neck With Contrast  Narrative: Patient: CHARLENE GARCIA  Time Out: 04:42  Exam(s): CT NECK With Contrast     EXAM:    CT Neck With Intravenous Contrast    CLINICAL HISTORY:     Reason for exam: upper airway stridor.    TECHNIQUE:    Axial computed tomography images of the neck with intravenous contrast.    CTDI is 13.48 mGy and DLP is 330.7 mGy-cm.  This CT exam was performed   according to the principle of ALARA (As Low As Reasonably Achievable) by   using one or more of the following dose reduction techniques: automated   exposure control, adjustment of the mA and or kV according to patient   size, and or use of iterative reconstruction technique.    COMPARISON:    No relevant prior studies available.    FINDINGS:    Oropharynx:  Unremarkable.  No significant tonsillar enlargement.  No   peritonsillar abscess.    Hypopharynx:  Unremarkable.    Larynx:  Unremarkable.  Normal epiglottis.    Trachea:  Unremarkable.    Retropharyngeal space:  Unremarkable.    Submandibular parotid glands:  Unremarkable.  Glands are normal in size.      Thyroid:  Unremarkable.  No enlarged or calcified nodules.    Bones joints:  Degenerative changes in the spine.  No acute fracture.    Soft tissues:  Unremarkable.    Vasculature:  Atherosclerotic disease.    Lymph nodes:  Unremarkable.  No lymphadenopathy.    " Dental:  Motion artifact and dental amalgam mildly limit evaluation.    Lung apices:  Unremarkable as visualized.    Other findings:  Patent airway.    IMPRESSION:       1.  Motion artifact and dental amalgam mildly limit evaluation.  2.  No acute findings on CT neck.  Impression: Electronically signed by Malvin Zayas MD on 06-29-24 at 0442  XR Chest 1 View  Narrative: Patient: CHARLENE GARCIA  Time Out: 01:03  Exam(s): XR CXR 1 VIEW     EXAM:    XR Chest, 1 View    CLINICAL HISTORY:     Reason for exam: Fall, shortness of breath.    TECHNIQUE:    Frontal view of the chest.    COMPARISON:    No previous studies.    FINDINGS:    Lungs:  Unremarkable.  No consolidation.    Pleural space:  Unremarkable.  No pneumothorax.    Heart:  Heart is normal in size.  No cardiomegaly.    Mediastinum:  Unremarkable.  Normal mediastinal contour.    Bones joints:  Osteopenia.  No acute fracture.    Vasculature:  Atherosclerotic disease.    Other findings:  Hypoaeration.    IMPRESSION:       1.  Marked hypoaeration.  2.  Atherosclerotic disease.  3.  No consolidative changes or pleural effusions.  4.  Osteopenia.  Impression: Electronically signed by Eliseo Ventura MD on 06-29-24 at 0103    Scheduled Medications  aspirin, 81 mg, Oral, Daily  atorvastatin, 10 mg, Oral, Daily  calcium carbonate (oyster shell), 500 mg, Oral, Daily  cefTRIAXone, 1,000 mg, Intravenous, Q24H  escitalopram, 20 mg, Oral, Daily  levothyroxine, 75 mcg, Oral, Q AM  metoprolol tartrate, 25 mg, Oral, Q12H  oxybutynin, 5 mg, Oral, BID  sodium chloride, 10 mL, Intravenous, Q12H    Infusions   Diet  Diet: Regular/House; Fluid Consistency: Thin (IDDSI 0)         I have personally reviewed:  [x]  Laboratory   []  Microbiology   []  Radiology   [x]  EKG/Telemetry   []  Cardiology/Vascular   []  Pathology   []  Records    Assessment/Plan     Active Hospital Problems    Diagnosis  POA    **UTI (urinary tract infection) [N39.0]  Yes    Dementia [F03.90]  Unknown     Hypothyroidism (acquired) [E03.9]  Unknown    CKD (chronic kidney disease) [N18.9]  Unknown    Fall [W19.XXXA]  Unknown    Hyperlipidemia [E78.5]  Unknown      Resolved Hospital Problems   No resolved problems to display.       89 y.o. female admitted with UTI (urinary tract infection).    Generalized weakness/age related physical debility  - had hoped pt could get back to [facility] but PT note this am indicates pt was max-assist for transfer from bed to chair.   - will d/w CCP on Monday     UTI with E coli  Overactive bladder  -Urine cx w/ >100k E coli- sensitivities pending, cont rocephin  -No previous urine cultures available, continue Rocephin empirically, follow-up urine culture/blood culture  -WBC improving from 16 down to 10; no LUTS reported  - home oxybutynin contined     History of cerebral ventriculomegaly/Frequent falls-outpatient neurology notes reviewed - previously worked up b for frequent falls- dx with orthostatic hypotension; rec walker  -PT/OT to see  -CT head negative for any acute fractures  - pt does have fairly loud systolic murmur on exam; she does struggle to tell details of the fall- last echo at [facility] 2021 without significant valvular dz- reasonable to check echo.      CKD3  -Creatinine 0.7, stable/at baseline     HLD  -On statin     HTN  -Metoprolol restarted     Hypothyroidism  -Continue Synthroid     Migraine/history of     SCDs for DVT ppx given propensity to falling.     SCDs for DVT prophylaxis.  Full code.  Discussed with patient.  Anticipated discharge SNF, TBD        Makenzie Jacobs MD  [facility]  06/30/24  12:56 EDT    I wore protective equipment throughout this patient encounter including gloves.  Hand hygiene was performed before donning protective equipment and after removal when leaving the room.         Copied text in this note has been reviewed and is accurate as of 06/30/24.

## 2024-07-01 ENCOUNTER — APPOINTMENT (OUTPATIENT)
Dept: CARDIOLOGY | Facility: HOSPITAL | Age: 89
End: 2024-07-01
Payer: COMMERCIAL

## 2024-07-01 LAB
ANION GAP SERPL CALCULATED.3IONS-SCNC: 11.2 MMOL/L (ref 5–15)
AORTIC ARCH: 2.6 CM
AORTIC DIMENSIONLESS INDEX: 0.7 (DI)
BACTERIA SPEC AEROBE CULT: ABNORMAL
BH CV ECHO MEAS - ACS: 1.46 CM
BH CV ECHO MEAS - AO MAX PG: 11.6 MMHG
BH CV ECHO MEAS - AO MEAN PG: 7.1 MMHG
BH CV ECHO MEAS - AO ROOT DIAM: 3.5 CM
BH CV ECHO MEAS - AO V2 MAX: 170.6 CM/SEC
BH CV ECHO MEAS - AO V2 VTI: 38.3 CM
BH CV ECHO MEAS - AVA(I,D): 3.1 CM2
BH CV ECHO MEAS - EDV(CUBED): 66 ML
BH CV ECHO MEAS - EDV(MOD-SP2): 66 ML
BH CV ECHO MEAS - EDV(MOD-SP4): 80 ML
BH CV ECHO MEAS - EF(MOD-BP): 76.7 %
BH CV ECHO MEAS - EF(MOD-SP2): 72.7 %
BH CV ECHO MEAS - EF(MOD-SP4): 80 %
BH CV ECHO MEAS - ESV(CUBED): 14.7 ML
BH CV ECHO MEAS - ESV(MOD-SP2): 18 ML
BH CV ECHO MEAS - ESV(MOD-SP4): 16 ML
BH CV ECHO MEAS - FS: 39.4 %
BH CV ECHO MEAS - IVS/LVPW: 1.16 CM
BH CV ECHO MEAS - IVSD: 1.05 CM
BH CV ECHO MEAS - LAT PEAK E' VEL: 7.1 CM/SEC
BH CV ECHO MEAS - LV DIASTOLIC VOL/BSA (35-75): 44.7 CM2
BH CV ECHO MEAS - LV MASS(C)D: 124.5 GRAMS
BH CV ECHO MEAS - LV MAX PG: 5.3 MMHG
BH CV ECHO MEAS - LV MEAN PG: 3.1 MMHG
BH CV ECHO MEAS - LV SYSTOLIC VOL/BSA (12-30): 8.9 CM2
BH CV ECHO MEAS - LV V1 MAX: 114.8 CM/SEC
BH CV ECHO MEAS - LV V1 VTI: 26.1 CM
BH CV ECHO MEAS - LVIDD: 4 CM
BH CV ECHO MEAS - LVIDS: 2.45 CM
BH CV ECHO MEAS - LVOT AREA: 4.6 CM2
BH CV ECHO MEAS - LVOT DIAM: 2.42 CM
BH CV ECHO MEAS - LVPWD: 0.9 CM
BH CV ECHO MEAS - MED PEAK E' VEL: 5.6 CM/SEC
BH CV ECHO MEAS - MV A DUR: 0.12 SEC
BH CV ECHO MEAS - MV A MAX VEL: 114 CM/SEC
BH CV ECHO MEAS - MV DEC SLOPE: 414 CM/SEC2
BH CV ECHO MEAS - MV DEC TIME: 0.22 SEC
BH CV ECHO MEAS - MV E MAX VEL: 84 CM/SEC
BH CV ECHO MEAS - MV E/A: 0.74
BH CV ECHO MEAS - MV MAX PG: 4.7 MMHG
BH CV ECHO MEAS - MV MEAN PG: 1.41 MMHG
BH CV ECHO MEAS - MV P1/2T: 79.7 MSEC
BH CV ECHO MEAS - MV V2 VTI: 30.9 CM
BH CV ECHO MEAS - MVA(P1/2T): 2.8 CM2
BH CV ECHO MEAS - MVA(VTI): 3.9 CM2
BH CV ECHO MEAS - PA ACC TIME: 0.13 SEC
BH CV ECHO MEAS - PA V2 MAX: 100.8 CM/SEC
BH CV ECHO MEAS - PULM A REVS DUR: 0.11 SEC
BH CV ECHO MEAS - PULM A REVS VEL: 27.4 CM/SEC
BH CV ECHO MEAS - PULM DIAS VEL: 28.3 CM/SEC
BH CV ECHO MEAS - PULM S/D: 1.38
BH CV ECHO MEAS - PULM SYS VEL: 39 CM/SEC
BH CV ECHO MEAS - RV MAX PG: 2.8 MMHG
BH CV ECHO MEAS - RV V1 MAX: 83.7 CM/SEC
BH CV ECHO MEAS - RV V1 VTI: 19.2 CM
BH CV ECHO MEAS - SV(LVOT): 119.5 ML
BH CV ECHO MEAS - SV(MOD-SP2): 48 ML
BH CV ECHO MEAS - SV(MOD-SP4): 64 ML
BH CV ECHO MEAS - SVI(LVOT): 66.8 ML/M2
BH CV ECHO MEAS - SVI(MOD-SP2): 26.8 ML/M2
BH CV ECHO MEAS - SVI(MOD-SP4): 35.8 ML/M2
BH CV ECHO MEAS - TAPSE (>1.6): 2.3 CM
BH CV ECHO MEASUREMENTS AVERAGE E/E' RATIO: 13.23
BH CV XLRA - TDI S': 10 CM/SEC
BUN SERPL-MCNC: 19 MG/DL (ref 8–23)
BUN/CREAT SERPL: 26 (ref 7–25)
CALCIUM SPEC-SCNC: 9 MG/DL (ref 8.2–9.6)
CHLORIDE SERPL-SCNC: 100 MMOL/L (ref 98–107)
CO2 SERPL-SCNC: 23.8 MMOL/L (ref 22–29)
CREAT SERPL-MCNC: 0.73 MG/DL (ref 0.57–1)
DEPRECATED RDW RBC AUTO: 41.5 FL (ref 37–54)
EGFRCR SERPLBLD CKD-EPI 2021: 76.8 ML/MIN/1.73
ERYTHROCYTE [DISTWIDTH] IN BLOOD BY AUTOMATED COUNT: 11.7 % (ref 12.3–15.4)
GLUCOSE SERPL-MCNC: 106 MG/DL (ref 65–99)
HCT VFR BLD AUTO: 39.1 % (ref 34–46.6)
HGB BLD-MCNC: 12.9 G/DL (ref 12–15.9)
LEFT ATRIUM VOLUME INDEX: 14.4 ML/M2
MCH RBC QN AUTO: 32.1 PG (ref 26.6–33)
MCHC RBC AUTO-ENTMCNC: 33 G/DL (ref 31.5–35.7)
MCV RBC AUTO: 97.3 FL (ref 79–97)
PLATELET # BLD AUTO: 215 10*3/MM3 (ref 140–450)
PMV BLD AUTO: 9.7 FL (ref 6–12)
POTASSIUM SERPL-SCNC: 4.3 MMOL/L (ref 3.5–5.2)
RBC # BLD AUTO: 4.02 10*6/MM3 (ref 3.77–5.28)
SINUS: 2.18 CM
SODIUM SERPL-SCNC: 135 MMOL/L (ref 136–145)
STJ: 2.01 CM
WBC NRBC COR # BLD AUTO: 10.2 10*3/MM3 (ref 3.4–10.8)

## 2024-07-01 PROCEDURE — 97166 OT EVAL MOD COMPLEX 45 MIN: CPT

## 2024-07-01 PROCEDURE — 97530 THERAPEUTIC ACTIVITIES: CPT

## 2024-07-01 PROCEDURE — 93306 TTE W/DOPPLER COMPLETE: CPT

## 2024-07-01 PROCEDURE — 25010000002 CEFTRIAXONE PER 250 MG: Performed by: NURSE PRACTITIONER

## 2024-07-01 PROCEDURE — 25510000001 PERFLUTREN (DEFINITY) 8.476 MG IN SODIUM CHLORIDE (PF) 0.9 % 10 ML INJECTION: Performed by: STUDENT IN AN ORGANIZED HEALTH CARE EDUCATION/TRAINING PROGRAM

## 2024-07-01 PROCEDURE — 93306 TTE W/DOPPLER COMPLETE: CPT | Performed by: INTERNAL MEDICINE

## 2024-07-01 PROCEDURE — 80048 BASIC METABOLIC PNL TOTAL CA: CPT | Performed by: STUDENT IN AN ORGANIZED HEALTH CARE EDUCATION/TRAINING PROGRAM

## 2024-07-01 PROCEDURE — 85027 COMPLETE CBC AUTOMATED: CPT | Performed by: STUDENT IN AN ORGANIZED HEALTH CARE EDUCATION/TRAINING PROGRAM

## 2024-07-01 RX ADMIN — CEFTRIAXONE SODIUM 1000 MG: 1 INJECTION, POWDER, FOR SOLUTION INTRAMUSCULAR; INTRAVENOUS at 22:01

## 2024-07-01 RX ADMIN — ESCITALOPRAM 20 MG: 20 TABLET, FILM COATED ORAL at 14:36

## 2024-07-01 RX ADMIN — METOPROLOL TARTRATE 25 MG: 25 TABLET, FILM COATED ORAL at 21:58

## 2024-07-01 RX ADMIN — ASPIRIN 81 MG: 81 TABLET, COATED ORAL at 09:13

## 2024-07-01 RX ADMIN — Medication 10 ML: at 22:00

## 2024-07-01 RX ADMIN — LEVOTHYROXINE SODIUM 75 MCG: 75 TABLET ORAL at 05:06

## 2024-07-01 RX ADMIN — PERFLUTREN 2 ML: 6.52 INJECTION, SUSPENSION INTRAVENOUS at 10:43

## 2024-07-01 RX ADMIN — METOPROLOL TARTRATE 25 MG: 25 TABLET, FILM COATED ORAL at 09:16

## 2024-07-01 RX ADMIN — OXYBUTYNIN CHLORIDE 5 MG: 5 TABLET ORAL at 21:58

## 2024-07-01 RX ADMIN — OXYBUTYNIN CHLORIDE 5 MG: 5 TABLET ORAL at 09:13

## 2024-07-01 RX ADMIN — Medication 500 MG: at 09:13

## 2024-07-01 RX ADMIN — ATORVASTATIN CALCIUM 10 MG: 20 TABLET, FILM COATED ORAL at 09:13

## 2024-07-01 NOTE — PLAN OF CARE
Goal Outcome Evaluation:  Plan of Care Reviewed With: patient           Outcome Evaluation: VSS, max assist x 2, disoriented to place and time, room air, expiratory wheezes, call light within reach and chair alarm set,

## 2024-07-01 NOTE — PLAN OF CARE
Goal Outcome Evaluation:              Outcome Evaluation: Pt alert, occasionally confused, on room air, incontinence pads and briefs, continues with antibiotics per MD order, no c/o pain or discomfort this shift, will continue with plan of care this shift.

## 2024-07-01 NOTE — THERAPY TREATMENT NOTE
Patient Name: Marianna Terrell  : 1931    MRN: [id number]                              Today's Date: 2024       Admit Date: 2024    Visit Dx:     ICD-10-CM ICD-9-CM   1. Acute cystitis without hematuria  N30.00 595.0   2. Closed head injury, initial encounter  S09.90XA 959.01   3. Expiratory stridor  R06.1 786.1     Patient Active Problem List   Diagnosis    UTI (urinary tract infection)    Dementia    Hypothyroidism (acquired)    CKD (chronic kidney disease)    Fall    Hyperlipidemia     Past Medical History:   Diagnosis Date    Anxiety     Chronic kidney disease (CKD), stage IV (severe)     Dementia     Difficulty walking     High cholesterol     HL (hearing loss)     Hypothyroid      Past Surgical History:   Procedure Laterality Date    CYSTECTOMY      HYSTERECTOMY      KNEE SURGERY        General Information       Row Name 24 1552          Physical Therapy Time and Intention    Document Type therapy note (daily note)  -     Mode of Treatment physical therapy  -       Row Name 24 1552          General Information    Existing Precautions/Restrictions fall  -       Row Name 24 1552          Cognition    Orientation Status (Cognition) oriented to;person;place  -               User Key  (r) = Recorded By, (t) = Taken By, (c) = Cosigned By      Initials Name Provider Type     Yamilka Mcrae PTA Physical Therapist Assistant                   Mobility       Row Name 24 1610          Bed Mobility    Bed Mobility supine-sit  -     Supine-Sit Phelps (Bed Mobility) minimum assist (75% patient effort);moderate assist (50% patient effort)  -     Assistive Device (Bed Mobility) bed rails;head of bed elevated;draw sheet  -       Row Name 24 1610          Sit-Stand Transfer    Sit-Stand Phelps (Transfers) minimum assist (75% patient effort);moderate assist (50% patient effort);2 person assist  -     Assistive Device (Sit-Stand Transfers)  walker, front-wheeled  -     Comment, (Sit-Stand Transfer) cues for hand placement  -SM       Row Name 07/01/24 1610          Gait/Stairs (Locomotion)    Nicolaus Level (Gait) minimum assist (75% patient effort);2 person assist  -     Assistive Device (Gait) walker, front-wheeled  -SM     Patient was able to Ambulate yes  -SM     Distance in Feet (Gait) 2  -SM     Deviations/Abnormal Patterns (Gait) maximino decreased;stride length decreased  -SM     Bilateral Gait Deviations forward flexed posture  -     Comment, (Gait/Stairs) small steps taken to chair  -SM               User Key  (r) = Recorded By, (t) = Taken By, (c) = Cosigned By      Initials Name Provider Type    Yamilka Garcia PTA Physical Therapist Assistant                   Obj/Interventions       [facility] Name 07/01/24 1615          Motor Skills    Therapeutic Exercise --  seated LAQ and marches x10 reps  -               User Key  (r) = Recorded By, (t) = Taken By, (c) = Cosigned By      Initials Name Provider Type    Yamilka Garcia PTA Physical Therapist Assistant                   Goals/Plan    No documentation.                  Clinical Impression       Row Name 07/01/24 1616          Pain    Pretreatment Pain Rating 0/10 - no pain  -SM     Posttreatment Pain Rating 0/10 - no pain  -SM       [facility] Name 07/01/24 1616          Positioning and Restraints    Pre-Treatment Position in bed  -SM     Post Treatment Position chair  -SM     In Chair reclined;call light within reach;encouraged to call for assist;exit alarm on  -SM               User Key  (r) = Recorded By, (t) = Taken By, (c) = Cosigned By      Initials Name Provider Type    Yamilka Garcia PTA Physical Therapist Assistant                   Outcome Measures       Row Name 07/01/24 1616 07/01/24 1436       How much help from another person do you currently need...    Turning from your back to your side while in flat bed without using bedrails? 3  -SM 3  -GG    Moving  from lying on back to sitting on the side of a flat bed without bedrails? 2  -SM 2  -GG    Moving to and from a bed to a chair (including a wheelchair)? 3  -SM 1  -GG    Standing up from a chair using your arms (e.g., wheelchair, bedside chair)? 2  -SM 1  -GG    Climbing 3-5 steps with a railing? 1  -SM 1  -GG    To walk in hospital room? 2  -SM 1  -GG    AM-PAC 6 Clicks Score (PT) 13  -SM 9  -GG    Highest Level of Mobility Goal 4 --> Transfer to chair/commode  -SM 3 --> Sit at edge of bed  -GG      Row Name 07/01/24 0913          How much help from another person do you currently need...    Turning from your back to your side while in flat bed without using bedrails? 3  -GG     Moving from lying on back to sitting on the side of a flat bed without bedrails? 2  -GG     Moving to and from a bed to a chair (including a wheelchair)? 1  -GG     Standing up from a chair using your arms (e.g., wheelchair, bedside chair)? 1  -GG     Climbing 3-5 steps with a railing? 1  -GG     To walk in hospital room? 1  -GG     AM-PAC 6 Clicks Score (PT) 9  -GG     Highest Level of Mobility Goal 3 --> Sit at edge of bed  -GG       Row Name 07/01/24 1521          Modified [place] Scale    Modified [place]inny Scale 4 - Moderately severe disability.  Unable to walk without assistance, and unable to attend to own bodily needs without assistance.  -MM (r) MF (t) MM (c)       Row Name 07/01/24 1616 07/01/24 1521       Functional Assessment    Outcome Measure Options AM-PAC 6 Clicks Basic Mobility (PT)  - AM-PAC 6 Clicks Daily Activity (OT);Modified [place]  -MM (r) MF (t) MM (c)              User Key  (r) = Recorded By, (t) = Taken By, (c) = Cosigned By      Initials Name Provider Type    Yamilka Garcia PTA Physical Therapist Assistant    Delma Ordoñez OT Occupational Therapist    Janie Dawn, RN Registered Nurse    Delma Quinones OT Student OT Student                                 Physical Therapy Education        Title: PT OT SLP Therapies (In Progress)       Topic: Physical Therapy (Done)       Point: Mobility training (Done)       Learning Progress Summary             Patient Acceptance, E,TB,D, VU,NR by  at 7/1/2024 1616    Acceptance, E,TB, VU,NR by  at 6/30/2024 1016                         Point: Home exercise program (Done)       Learning Progress Summary             Patient Acceptance, E,TB,D, VU,NR by  at 7/1/2024 1616    Acceptance, E,TB, VU,NR by  at 6/30/2024 1016                         Point: Body mechanics (Done)       Learning Progress Summary             Patient Acceptance, E,TB,D, VU,NR by  at 7/1/2024 1616    Acceptance, E,TB, VU,NR by  at 6/30/2024 1016                         Point: Precautions (Done)       Learning Progress Summary             Patient Acceptance, E,TB,D, VU,NR by  at 7/1/2024 1616    Acceptance, E,TB, VU,NR by  at 6/30/2024 1016                                         User Key       Initials Effective Dates Name Provider Type Discipline     03/07/18 -  Yamilka Mcrae PTA Physical Therapist Assistant PT     07/11/23 -  Jean Pierre Pagan PT Physical Therapist PT                  PT Recommendation and Plan     Plan of Care Reviewed With: patient  Progress: improving  Outcome Evaluation: Pt tolerated treatment well this date. Required min-mod A for bed mobility, then mod A x2 to stand. Pt ambulated ~2ft to the chair w/ min A x2 and Rw. Instructed pt on a few seated LE exercises, and encouraged pt to stay up in chair through dinner at least.     Time Calculation:         PT Charges       Row Name 07/01/24 1631             Time Calculation    Start Time 1504  -      Stop Time 1520  -      Time Calculation (min) 16 min  -      PT Received On 07/01/24  -      PT - Next Appointment 07/02/24  -                User Key  (r) = Recorded By, (t) = Taken By, (c) = Cosigned By      Initials Name Provider Type    Yamilka Garcia PTA Physical Therapist  Assistant                  Therapy Charges for Today       Code Description Service Date Service Provider Modifiers Qty    32231030747  PT THERAPEUTIC ACT EA 15 MIN 7/1/2024 Yamilka Mcrae, DORINA GP 1    47088842823 HC PT THER SUPP EA 15 MIN 7/1/2024 Yamilka Mcrae, DORINA GP 1            PT G-Codes  Outcome Measure Options: AM-PAC 6 Clicks Basic Mobility (PT)  AM-PAC 6 Clicks Score (PT): 13  AM-PAC 6 Clicks Score (OT): 16  Modified [place] Scale: 4 - Moderately severe disability.  Unable to walk without assistance, and unable to attend to own bodily needs without assistance.  PT Discharge Summary  Anticipated Discharge Disposition (PT): skilled nursing facility    Yamilka Mcrae PTA  7/1/2024

## 2024-07-01 NOTE — PLAN OF CARE
Goal Outcome Evaluation:  Plan of Care Reviewed With: (P) patient        Progress: (P) no change  Outcome Evaluation: (P) Pt is a 89 year old F with hx of dementia and hearing loss admitted for acute fall and UTI. Pt lives in an [facility] facility and requires min A for most ADL's and uses rwx during functional mobility. Pt also reports recent hx of falls. Today, pt demo'd minAx1 bed supine-sit transfer and minAx2 STS transfer and functional mobility with rwx. Pt took a few side steps at EOB required sequencing and initiation cues throughout. Pt sat EOB and completed g/h task with SBA and needed mod Ax2 to return to supine. Pt displayed significant wheezing and SOB throughout session on room air. Overall, pt presents with limited activity tolerance, functional endurance, and (I) and safety during ADL's and functional mobility d/t generalized weakness. Pt would benefit from OT services to address noted deficits as well as high falls risk. Rec d/c SNF for safety      Anticipated Discharge Disposition (OT): (P) skilled nursing facility

## 2024-07-01 NOTE — CASE MANAGEMENT/SOCIAL WORK
Discharge Planning Assessment  [facility]     Patient Name: Marianna Terrell  MRN: [id number]  Today's Date: 7/1/2024    Admit Date: 6/28/2024    Plan: SNF   Discharge Needs Assessment       Row Name 07/01/24 1634       Living Environment    People in Home facility resident    Current Living Arrangements assisted living facility    Potentially Unsafe Housing Conditions none    In the past 12 months has the electric, gas, oil, or water company threatened to shut off services in your home? No    Primary Care Provided by other (see comments);self  Facility staff    Provides Primary Care For no one    Family Caregiver if Needed other (see comments);child(genesis), adult    Family Caregiver Names Son Pineda [phone] and facility staff    Quality of Family Relationships supportive    Able to Return to Prior Arrangements yes       Resource/Environmental Concerns    Resource/Environmental Concerns none    Transportation Concerns none       Transportation Needs    In the past 12 months, has lack of transportation kept you from medical appointments or from getting medications? no    In the past 12 months, has lack of transportation kept you from meetings, work, or from getting things needed for daily living? No       Food Insecurity    Within the past 12 months, you worried that your food would run out before you got the money to buy more. Never true    Within the past 12 months, the food you bought just didn't last and you didn't have money to get more. Never true       Transition Planning    Patient/Family Anticipates Transition to inpatient rehabilitation facility    Patient/Family Anticipated Services at Transition rehabilitation services    Transportation Anticipated health plan transportation       Discharge Needs Assessment    Equipment Currently Used at Home walker, rolling    Anticipated Changes Related to Illness inability to care for self    Equipment Needed After Discharge none                   Discharge Plan        Row Name 07/01/24 1637       Plan    Plan SNF    Patient/Family in Agreement with Plan yes    Plan Comments Met with pt in room and spoke to shanthi Sung on phone. Introduced self, explained  role, verified face sheet. Plan is rehab at discharge. Pt states that she is scare of falling and needs rehab. They do not have a preferred facility, referrals placed for facilities near her home. She lives at [facility]. She will need a wheelchair van for transportation. She denies any DME needs at this time. CCP to follow for needs. TU Lindo RN                  Continued Care and Services - Admitted Since 6/28/2024       Destination       Service Provider Request Status Selected Services Address Phone Fax Patient Preferred    [facility] Pending - Request Sent N/A [address] [address] [phone] --    [facility] Pending - Request Sent N/A [address] [address] [phone] --    [facility] Pending - Request Sent N/A [address] [address] [phone] --    [facility] Pending - Request Sent N/A [address] [facility] [address] [phone] --                     Demographic Summary       Row Name 07/01/24 1634       General Information    Admission Type inpatient    Preferred Language English                   Functional Status    No documentation.                  Psychosocial    No documentation.                  Abuse/Neglect    No documentation.                  Legal    No documentation.                  Substance Abuse    No documentation.                  Patient Forms    No documentation.                     Srinivas Alba RN

## 2024-07-01 NOTE — THERAPY EVALUATION
Patient Name: Marianna Terrell  : 1931    MRN: [id number]                              Today's Date: 2024       Admit Date: 2024    Visit Dx:     ICD-10-CM ICD-9-CM   1. Acute cystitis without hematuria  N30.00 595.0   2. Closed head injury, initial encounter  S09.90XA 959.01   3. Expiratory stridor  R06.1 786.1     Patient Active Problem List   Diagnosis    UTI (urinary tract infection)    Dementia    Hypothyroidism (acquired)    CKD (chronic kidney disease)    Fall    Hyperlipidemia     Past Medical History:   Diagnosis Date    Anxiety     Chronic kidney disease (CKD), stage IV (severe)     Dementia     Difficulty walking     High cholesterol     HL (hearing loss)     Hypothyroid      Past Surgical History:   Procedure Laterality Date    CYSTECTOMY      HYSTERECTOMY      KNEE SURGERY        General Information       Row Name 24 1505          OT Time and Intention    Document Type evaluation (P)   -     Mode of Treatment individual therapy;occupational therapy (P)   -       Row Name 24 1505          General Information    Patient Profile Reviewed yes (P)   -MF     Prior Level of Function independent:;min assist: (P)   -     Existing Precautions/Restrictions fall (P)   -     Barriers to Rehab cognitive status (P)   -       Row Name 24 1505          Living Environment    People in Home facility resident (P)   -     Name(s) of People in Home half-way resident (P)   -       Row Name 24 1505          Cognition    Orientation Status (Cognition) oriented to;person;situation (P)   -       Row Name 24 1505          Safety Issues, Functional Mobility    Safety Issues Affecting Function (Mobility) awareness of need for assistance;insight into deficits/self-awareness;judgment;problem-solving;safety precaution awareness (P)   -     Impairments Affecting Function (Mobility) endurance/activity tolerance;strength;range of motion (ROM);postural/trunk  control;balance;shortness of breath (P)   -     Comment, Safety Issues/Impairments (Mobility) high falls risk d/t weakness (P)   -               User Key  (r) = Recorded By, (t) = Taken By, (c) = Cosigned By      Initials Name Provider Type    Delma Quinones OT Student OT Student                     Mobility/ADL's       Row Name 07/01/24 1506          Bed Mobility    Bed Mobility supine-sit;sit-supine (P)   -     Supine-Sit [place] (Bed Mobility) minimum assist (75% patient effort);1 person assist (P)   -MF     Sit-Supine [place] (Bed Mobility) moderate assist (50% patient effort);2 person assist (P)   -     Bed Mobility, Safety Issues decreased use of arms for pushing/pulling;decreased use of legs for bridging/pushing (P)   -     Assistive Device (Bed Mobility) bed rails;head of bed elevated;draw sheet (P)   -       Row Name 07/01/24 1506          Transfers    Transfers sit-stand transfer;stand-sit transfer (P)   -       Row Name 07/01/24 1506          Sit-Stand Transfer    Sit-Stand [place] (Transfers) minimum assist (75% patient effort);2 person assist (P)   -     Assistive Device (Sit-Stand Transfers) walker, front-wheeled (P)   -     Comment, (Sit-Stand Transfer) x2 from EOB (P)   -       Row Name 07/01/24 1506          Stand-Sit Transfer    Stand-Sit [place] (Transfers) minimum assist (75% patient effort);2 person assist (P)   -     Assistive Device (Stand-Sit Transfers) walker, front-wheeled (P)   -       Row Name 07/01/24 1506          Functional Mobility    Functional Mobility- Ind. Level minimum assist (75% patient effort) (P)   -     Functional Mobility- Device walker, front-wheeled (P)   -     Functional Mobility- Safety Issues sequencing ability decreased;step length decreased (P)   -     Functional Mobility- Comment took side steps at EOB (P)   -     Patient was able to Ambulate yes (P)   -       Row Name 07/01/24 1506          Activities  of Daily Living    BADL Assessment/Intervention lower body dressing (P)   -       Row Name 07/01/24 1506          Lower Body Dressing Assessment/Training    [place] Level (Lower Body Dressing) lower body dressing skills;don;doff;socks;maximum assist (25% patient effort) (P)   -     Position (Lower Body Dressing) supine (P)   -               User Key  (r) = Recorded By, (t) = Taken By, (c) = Cosigned By      Initials Name Provider Type    Delma Quinones OT Student OT Student                   Obj/Interventions       [facility] Name 07/01/24 1509          Sensory Assessment (Somatosensory)    Sensory Assessment (Somatosensory) sensation intact (P)   -[facility] Name 07/01/24 1509          Vision Assessment/Intervention    Visual Impairment/Limitations WFL (P)   -[facility] Name 07/01/24 1509          Range of Motion Comprehensive    General Range of Motion bilateral upper extremity ROM WFL (P)   -[facility] Name 07/01/24 1509          Strength Comprehensive (MMT)    General Manual Muscle Testing (MMT) Assessment upper extremity strength deficits identified (P)   -     Comment, General Manual Muscle Testing (MMT) Assessment generalized weakness BUE grossly 3+/5 MMT (P)   -[facility] Name 07/01/24 1509          Motor Skills    Motor Skills coordination;functional endurance (P)   -     Coordination WFL (P)   -     Functional Endurance limited d/t weakness (P)   -[facility] Name 07/01/24 1509          Balance    Balance Assessment sitting static balance;sitting dynamic balance;sit to stand dynamic balance;standing static balance;standing dynamic balance (P)   -     Static Sitting Balance standby assist;1-person assist (P)   -     Dynamic Sitting Balance contact guard;1-person assist (P)   -     Position, Sitting Balance sitting edge of bed (P)   -     Sit to Stand Dynamic Balance minimal assist;2-person assist (P)   -     Static Standing Balance minimal assist;2-person assist (P)    -     Dynamic Standing Balance minimal assist;2-person assist (P)   -MF     Position/Device Used, Standing Balance walker, front-wheeled (P)   -MF     Balance Interventions sitting;standing;sit to stand;supported;static;dynamic;moderate challenge (P)   -MF     Comment, Balance generally unsteady on feet d/t weakness; posterior lean (P)   -MF               User Key  (r) = Recorded By, (t) = Taken By, (c) = Cosigned By      Initials Name Provider Type     Delma Farrell OT Student OT Student                   Goals/Plan       Row Name 07/01/24 1520          Bed Mobility Goal 1 (OT)    Activity/Assistive Device (Bed Mobility Goal 1, OT) bed mobility activities, all (P)   -MF     [place] Level/Cues Needed (Bed Mobility Goal 1, OT) standby assist (P)   -MF     Time Frame (Bed Mobility Goal 1, OT) short term goal (STG);2 weeks (P)   -MF     Progress/Outcomes (Bed Mobility Goal 1, OT) goal ongoing (P)   -       Row Name 07/01/24 1520          Transfer Goal 1 (OT)    Activity/Assistive Device (Transfer Goal 1, OT) transfers, all (P)   -MF     [place] Level/Cues Needed (Transfer Goal 1, OT) standby assist (P)   -MF     Time Frame (Transfer Goal 1, OT) short term goal (STG);2 weeks (P)   -MF     Progress/Outcome (Transfer Goal 1, OT) goal ongoing (P)   -       Row Name 07/01/24 1520          Bathing Goal 1 (OT)    Activity/Device (Bathing Goal 1, OT) bathing skills, all (P)   -MF     [place] Level/Cues Needed (Bathing Goal 1, OT) moderate assist (50-74% patient effort) (P)   -MF     Time Frame (Bathing Goal 1, OT) short term goal (STG);2 weeks (P)   -MF     Progress/Outcomes (Bathing Goal 1, OT) goal ongoing (P)   -       Row Name 07/01/24 1520          Dressing Goal 1 (OT)    Activity/Device (Dressing Goal 1, OT) dressing skills, all (P)   -MF     [place]/Cues Needed (Dressing Goal 1, OT) minimum assist (75% or more patient effort) (P)   -     Time Frame (Dressing Goal 1, OT) short term  goal (STG);2 weeks (P)   -       Row Name 07/01/24 1520          Toileting Goal 1 (OT)    Activity/Device (Toileting Goal 1, OT) toileting skills, all (P)   -     [place] Level/Cues Needed (Toileting Goal 1, OT) minimum assist (75% or more patient effort) (P)   -     Time Frame (Toileting Goal 1, OT) short term goal (STG);2 weeks (P)   -       Row Name 07/01/24 1520          Therapy Assessment/Plan (OT)    Planned Therapy Interventions (OT) activity tolerance training;BADL retraining;functional balance retraining;neuromuscular control/coordination retraining;occupation/activity based interventions;patient/caregiver education/training;ROM/therapeutic exercise;strengthening exercise;transfer/mobility retraining (P)   -               User Key  (r) = Recorded By, (t) = Taken By, (c) = Cosigned By      Initials Name Provider Type     Delma Farrell OT Student OT Student                   Clinical Impression       Row Name 07/01/24 1513          Pain Assessment    Pretreatment Pain Rating 0/10 - no pain (P)   -     Posttreatment Pain Rating 0/10 - no pain (P)   -       Row Name 07/01/24 1513          Plan of Care Review    Plan of Care Reviewed With patient (P)   -     Progress no change (P)   -     Outcome Evaluation Pt is a 89 year old F with hx of dementia and hearing loss admitted for acute fall and UTI. Pt [facility] and requires min A for most ADL's and uses rwx during functional mobility. Pt also reports recent hx of falls. Today, pt demo'd minAx1 bed supine-sit transfer and minAx2 STS transfer and functional mobility with rwx. Pt took a few side steps at EOB required sequencing and initiation cues throughout. Pt sat EOB and completed g/h task with SBA and needed mod Ax2 to return to supine. Pt displayed significant wheezing and SOB throughout session on room air. Overall, pt presents with limited activity tolerance, functional endurance, and (I) and safety during ADL's  and functional mobility d/t generalized weakness. Pt would benefit from OT services to address noted deficits as well as high falls risk. Rec d/c SNF for safety (P)   -       Row Name 07/01/24 1513          Therapy Assessment/Plan (OT)    Rehab Potential (OT) good, to achieve stated therapy goals (P)   -     Criteria for Skilled Therapeutic Interventions Met (OT) yes;skilled treatment is necessary (P)   -     Therapy Frequency (OT) 5 times/wk (P)   -       Row Name 07/01/24 1513          Therapy Plan Review/Discharge Plan (OT)    Anticipated Discharge Disposition (OT) skilled nursing facility (P)   -       Row Name 07/01/24 1513          Vital Signs    O2 Delivery Pre Treatment room air (P)   -     O2 Delivery Intra Treatment room air (P)   -     O2 Delivery Post Treatment room air (P)   -       Row Name 07/01/24 1513          Positioning and Restraints    Pre-Treatment Position in bed (P)   -     Post Treatment Position bed (P)   -     In Bed notified nsg;supine;call light within reach;exit alarm on (P)   -               User Key  (r) = Recorded By, (t) = Taken By, (c) = Cosigned By      Initials Name Provider Type    Delma Quinones, OT Student OT Student                   Outcome Measures       Row Name 07/01/24 1521          How much help from another is currently needed...    Putting on and taking off regular lower body clothing? 2 (P)   -MF     Bathing (including washing, rinsing, and drying) 2 (P)   -MF     Toileting (which includes using toilet bed pan or urinal) 2 (P)   -MF     Putting on and taking off regular upper body clothing 3 (P)   -MF     Taking care of personal grooming (such as brushing teeth) 3 (P)   -MF     Eating meals 4 (P)   -MF     AM-PAC 6 Clicks Score (OT) 16 (P)   -       Row Name 07/01/24 1436 07/01/24 0913       How much help from another person do you currently need...    Turning from your back to your side while in flat bed without using bedrails? 3   -GG 3  -GG    Moving from lying on back to sitting on the side of a flat bed without bedrails? 2  -GG 2  -GG    Moving to and from a bed to a chair (including a wheelchair)? 1  -GG 1  -GG    Standing up from a chair using your arms (e.g., wheelchair, bedside chair)? 1  -GG 1  -GG    Climbing 3-5 steps with a railing? 1  -GG 1  -GG    To walk in hospital room? 1  -GG 1  -GG    AM-PAC 6 Clicks Score (PT) 9  -GG 9  -GG    Highest Level of Mobility Goal 3 --> Sit at edge of bed  -GG 3 --> Sit at edge of bed  -GG      Row Name 07/01/24 1521          Modified [place] Scale    Modified [place] Scale 4 - Moderately severe disability.  Unable to walk without assistance, and unable to attend to own bodily needs without assistance. (P)   -       Row Name 07/01/24 1521          Functional Assessment    Outcome Measure Options AM-PAC 6 Clicks Daily Activity (OT);Modified [place] (P)   -               User Key  (r) = Recorded By, (t) = Taken By, (c) = Cosigned By      Initials Name Provider Type    Janie Dawn, RN Registered Nurse    Delma Quinones, OT Student OT Student                    Occupational Therapy Education       Title: PT OT SLP Therapies (In Progress)       Topic: Occupational Therapy (In Progress)       Point: ADL training (In Progress)       Description:   Instruct learner(s) on proper safety adaptation and remediation techniques during self care or transfers.   Instruct in proper use of assistive devices.                  Learning Progress Summary             Patient Acceptance, E, NR by  at 7/1/2024 1522                         Point: Home exercise program (In Progress)       Description:   Instruct learner(s) on appropriate technique for monitoring, assisting and/or progressing therapeutic exercises/activities.                  Learning Progress Summary             Patient Acceptance, E, NR by  at 7/1/2024 1522                         Point: Precautions (In Progress)       Description:    Instruct learner(s) on prescribed precautions during self-care and functional transfers.                  Learning Progress Summary             Patient Acceptance, E, NR by  at 7/1/2024 1522                         Point: Body mechanics (In Progress)       Description:   Instruct learner(s) on proper positioning and spine alignment during self-care, functional mobility activities and/or exercises.                  Learning Progress Summary             Patient Acceptance, E, NR by  at 7/1/2024 1522                                         User Key       Initials Effective Dates Name Provider Type Discipline     04/30/24 -  Delma Farrell, RETA Student OT Student OT                  OT Recommendation and Plan  Planned Therapy Interventions (OT): (P) activity tolerance training, BADL retraining, functional balance retraining, neuromuscular control/coordination retraining, occupation/activity based interventions, patient/caregiver education/training, ROM/therapeutic exercise, strengthening exercise, transfer/mobility retraining  Therapy Frequency (OT): (P) 5 times/wk  Plan of Care Review  Plan of Care Reviewed With: (P) patient  Progress: (P) no change  Outcome Evaluation: (P) Pt is a 89 year old F with hx of dementia and hearing loss admitted for acute fall and UTI. Pt [facility] in an [facility] facility and requires min A for most ADL's and uses rwx during functional mobility. Pt also reports recent hx of falls. Today, pt demo'd minAx1 bed supine-sit transfer and minAx2 STS transfer and functional mobility with rwx. Pt took a few side steps at EOB required sequencing and initiation cues throughout. Pt sat EOB and completed g/h task with SBA and needed mod Ax2 to return to supine. Pt displayed significant wheezing and SOB throughout session on room air. Overall, pt presents with limited activity tolerance, functional endurance, and (I) and safety during ADL's and functional mobility d/t generalized weakness. Pt would  benefit from OT services to address noted deficits as well as high falls risk. Rec d/c SNF for safety     Time Calculation:   Evaluation Complexity (OT)  Review Occupational Profile/Medical/Therapy History Complexity: (P) expanded/moderate complexity  Assessment, Occupational Performance/Identification of Deficit Complexity: (P) 3-5 performance deficits  Clinical Decision Making Complexity (OT): (P) detailed assessment/moderate complexity  Overall Complexity of Evaluation (OT): (P) moderate complexity     Time Calculation- OT       Row Name 07/01/24 1523             Time Calculation- OT    OT Start Time 1414 (P)   -      OT Stop Time 1430 (P)   -      OT Time Calculation (min) 16 min (P)   -      Total Timed Code Minutes- OT 8 minute(s) (P)   -      OT Received On 07/01/24 (P)   -      OT - Next Appointment 07/02/24 (P)   -      OT Goal Re-Cert Due Date 07/15/24 (P)   -         Timed Charges    50429 - OT Therapeutic Activity Minutes 8 (P)   -         Untimed Charges    OT Eval/Re-eval Minutes 8 (P)   -         Total Minutes    Timed Charges Total Minutes 8 (P)   -      Untimed Charges Total Minutes 8 (P)   -       Total Minutes 16 (P)   -                User Key  (r) = Recorded By, (t) = Taken By, (c) = Cosigned By      Initials Name Provider Type     Delma Farrell OT Student OT Student                  Therapy Charges for Today       Code Description Service Date Service Provider Modifiers Qty    [id number]  OT THERAPEUTIC ACT EA 15 MIN 7/1/2024 Delma Farrell OT Student GO 1    [id number]  OT EVAL MOD COMPLEXITY 2 7/1/2024 Delma Farrell OT Student GO 1                 Delma Farrell OT Student  7/1/2024

## 2024-07-01 NOTE — DISCHARGE PLACEMENT REQUEST
"Marianna Terrell (89 y.o. Female)       Date of Birth   05/08/1931    Social Security Number       Address   [facility] [id number]    Home Phone       MRN   [id number]       [employer]    Marital Status   Other                            Admission Date   6/28/24    Admission Type   Emergency    Admitting Provider   Eros Dalton MD    Attending Provider   Makenzie Jacobs MD    Department, Room/Bed   [address], [address]/1       Discharge Date       Discharge Disposition       Discharge Destination                                 Attending Provider: Makenzie Jacobs MD    Allergies: No Known Allergies    Isolation: None   Infection: None   Code Status: CPR    Ht: 162.6 cm (64\")   Wt: 73.5 kg (162 lb)    Admission Cmt: None   Principal Problem: UTI (urinary tract infection) [N39.0]                   Active Insurance as of 6/28/2024       Primary Coverage       Payor Plan Insurance Group Employer/Plan Group    MEDICARE MEDICARE A ONLY        Payor Plan Address Payor Plan Phone Number Payor Plan Fax Number Effective Dates    [address] [phone]  5/1/1996 - None Entered    [facility] [id number]         Subscriber Name Subscriber Birth Date Member ID       MARIANNA TERRELL 5/8/1931 [id number]               Secondary Coverage       Payor Plan Insurance Group Employer/Plan Group    [id number]       Payor Plan Address Payor Plan Phone Number Payor Plan Fax Number Effective Dates    [address]   1/1/2006 - None Entered    [facility] [id number]         Subscriber Name Subscriber Birth Date Member ID       MARIANNA TERRELL 5/8/1931 [id number]                     Emergency Contacts        (Rel.) Home Phone Work Phone Mobile Phone    Pineda Terrell (Son) [phone] -- --              Emergency Contact Information       Name Relation Home Work Mobile    Pineda Terrell Son [phone]            "

## 2024-07-01 NOTE — PROGRESS NOTES
Name: Marianna Terrell ADMIT: 2024   : 1931  PCP: Maya Brizuela APRN    MRN: [id number] LOS: 1 days   AGE/SEX: 89 y.o. female  ROOM: [facility]     Subjective   Subjective   Patient just got back from echo. She tells me she's had an murmur her entire life. Doesn't follow with a cardiologist. Reiterated to patient she will need SNF/rehab before going  back to [facility]. RN reports she was very weak when they attempted transfer earlier.        Objective   Objective   Vital Signs  Temp:  [97.4 °F (36.3 °C)-98 °F (36.7 °C)] 97.5 °F (36.4 °C)  Heart Rate:  [58-70] 65  Resp:  [16] 16  BP: (136-161)/(60-76) 136/67  SpO2:  [89 %-98 %] 94 %  on   ;   Device (Oxygen Therapy): room air  Body mass index is 27.81 kg/m².  Physical Exam  Constitutional:       General: She is not in acute distress.     Appearance: She is not ill-appearing.      Comments: Elderly, frail   Cardiovascular:      Rate and Rhythm: Normal rate and regular rhythm.      Heart sounds: Murmur heard.   Pulmonary:      Effort: Pulmonary effort is normal. No respiratory distress.   Abdominal:      General: Abdomen is flat.      Palpations: Abdomen is soft.      Tenderness: There is no abdominal tenderness.   Musculoskeletal:         General: No swelling or tenderness.      Right lower leg: No edema.      Left lower leg: No edema.   Skin:     General: Skin is warm and dry.   Neurological:      General: No focal deficit present.      Mental Status: She is alert and oriented to person, place, and time. Mental status is at baseline.      Comments: Pleasantly forgetful         Results Review     I reviewed the patient's new clinical results.  Results from last 7 days   Lab Units 24  0559 24  0515 24  0536 24  2319   WBC 10*3/mm3 10.20 9.74 10.56 16.04*   HEMOGLOBIN g/dL 12.9 12.5 12.8 14.1   PLATELETS 10*3/mm3 215 194 210 230     Results from last 7 days   Lab Units 24  0559 24  0514 24  0536 24  2124   SODIUM mmol/L  "135* 138 136 139   POTASSIUM mmol/L 4.3 4.5 4.5 4.5   CHLORIDE mmol/L 100 104 101 101   CO2 mmol/L 23.8 26.0 23.3 24.7   BUN mg/dL 19 29* 22 22   CREATININE mg/dL 0.73 0.85 0.71 0.83   GLUCOSE mg/dL 106* 102* 109* 129*   Estimated Creatinine Clearance: 47.3 mL/min (by C-G formula based on SCr of 0.73 mg/dL).  Results from last 7 days   Lab Units 06/28/24  2319   ALBUMIN g/dL 4.2   BILIRUBIN mg/dL 0.4   ALK PHOS U/L 42   AST (SGOT) U/L 11   ALT (SGPT) U/L 14     Results from last 7 days   Lab Units 07/01/24  0559 06/30/24  0514 06/29/24  0536 06/28/24  2319   CALCIUM mg/dL 9.0 9.1 9.1 9.8*   ALBUMIN g/dL  --   --   --  4.2     Results from last 7 days   Lab Units 06/29/24  0045   LACTATE mmol/L 1.2   No results found for: \"COVID19\"  No results found for: \"HGBA1C\", \"POCGLU\"    CT Soft Tissue Neck With Contrast  Narrative: Patient: CHARLENE GARCIA  Time Out: 04:42  Exam(s): CT NECK With Contrast     EXAM:    CT Neck With Intravenous Contrast    CLINICAL HISTORY:     Reason for exam: upper airway stridor.    TECHNIQUE:    Axial computed tomography images of the neck with intravenous contrast.    CTDI is 13.48 mGy and DLP is 330.7 mGy-cm.  This CT exam was performed   according to the principle of ALARA (As Low As Reasonably Achievable) by   using one or more of the following dose reduction techniques: automated   exposure control, adjustment of the mA and or kV according to patient   size, and or use of iterative reconstruction technique.    COMPARISON:    No relevant prior studies available.    FINDINGS:    Oropharynx:  Unremarkable.  No significant tonsillar enlargement.  No   peritonsillar abscess.    Hypopharynx:  Unremarkable.    Larynx:  Unremarkable.  Normal epiglottis.    Trachea:  Unremarkable.    Retropharyngeal space:  Unremarkable.    Submandibular parotid glands:  Unremarkable.  Glands are normal in size.      Thyroid:  Unremarkable.  No enlarged or calcified nodules.    Bones joints:  Degenerative changes in " the spine.  No acute fracture.    Soft tissues:  Unremarkable.    Vasculature:  Atherosclerotic disease.    Lymph nodes:  Unremarkable.  No lymphadenopathy.    Dental:  Motion artifact and dental amalgam mildly limit evaluation.    Lung apices:  Unremarkable as visualized.    Other findings:  Patent airway.    IMPRESSION:       1.  Motion artifact and dental amalgam mildly limit evaluation.  2.  No acute findings on CT neck.  Impression: Electronically signed by Malvin Zayas MD on 06-29-24 at 0442  XR Chest 1 View  Narrative: Patient: CHARLENE GARCIA  Time Out: 01:03  Exam(s): XR CXR 1 VIEW     EXAM:    XR Chest, 1 View    CLINICAL HISTORY:     Reason for exam: Fall, shortness of breath.    TECHNIQUE:    Frontal view of the chest.    COMPARISON:    No previous studies.    FINDINGS:    Lungs:  Unremarkable.  No consolidation.    Pleural space:  Unremarkable.  No pneumothorax.    Heart:  Heart is normal in size.  No cardiomegaly.    Mediastinum:  Unremarkable.  Normal mediastinal contour.    Bones joints:  Osteopenia.  No acute fracture.    Vasculature:  Atherosclerotic disease.    Other findings:  Hypoaeration.    IMPRESSION:       1.  Marked hypoaeration.  2.  Atherosclerotic disease.  3.  No consolidative changes or pleural effusions.  4.  Osteopenia.  Impression: Electronically signed by Eliseo Ventura MD on 06-29-24 at 0103    Scheduled Medications  aspirin, 81 mg, Oral, Daily  atorvastatin, 10 mg, Oral, Daily  calcium carbonate (oyster shell), 500 mg, Oral, Daily  cefTRIAXone, 1,000 mg, Intravenous, Q24H  escitalopram, 20 mg, Oral, Daily  levothyroxine, 75 mcg, Oral, Q AM  metoprolol tartrate, 25 mg, Oral, Q12H  oxybutynin, 5 mg, Oral, BID  sodium chloride, 10 mL, Intravenous, Q12H    Infusions   Diet  Diet: Regular/House; Fluid Consistency: Thin (IDDSI 0)         I have personally reviewed:  [x]  Laboratory   []  Microbiology   []  Radiology   [x]  EKG/Telemetry   []  Cardiology/Vascular   []  Pathology   []   Records    Assessment/Plan     Active Hospital Problems    Diagnosis  POA    **UTI (urinary tract infection) [N39.0]  Yes    Dementia [F03.90]  Unknown    Hypothyroidism (acquired) [E03.9]  Unknown    CKD (chronic kidney disease) [N18.9]  Unknown    Fall [W19.XXXA]  Unknown    Hyperlipidemia [E78.5]  Unknown      Resolved Hospital Problems   No resolved problems to display.       89 y.o. female admitted with UTI (urinary tract infection).    Generalized weakness/age related physical debility  - had hoped pt could get back to [facility] but PT note this am indicates pt was max-assist for transfer from bed to chair.   - will d/w CCP     UTI with E coli - completed course of abx.   Overactive bladder  -Urine cx w/ >100k E coli- sensitive to rocephin- completed 3/3 days   - blood cx NGTD  -WBC normalized  - home oxybutynin contined     History of cerebral ventriculomegaly/Frequent falls-outpatient neurology notes reviewed - previously worked up b for frequent falls- dx with orthostatic hypotension; rec walker  -PT/OT to see  -CT head negative for any acute fractures  - pt does have fairly loud systolic murmur on exam; she does struggle to tell details of the fall- last echo at [place] 2021 without significant valvular dz- reasonable to check echo- done pending read     CKD3  -Creatinine 0.7, stable/at baseline     HLD  -On statin     HTN  -Metoprolol restarted     Hypothyroidism  -Continue Synthroid     Migraine/history of     SCDs for DVT ppx given propensity to falling.     SCDs for DVT prophylaxis.  Full code.  Discussed with patient RN  Anticipated discharge SNF, TBD        Makenzie Jacobs MD  [facility]  07/01/24  10:54 EDT    I wore protective equipment throughout this patient encounter including gloves.  Hand hygiene was performed before donning protective equipment and after removal when leaving the room.         Copied text in this note has been reviewed and is accurate as of 07/01/24.

## 2024-07-01 NOTE — PLAN OF CARE
Goal Outcome Evaluation:  Plan of Care Reviewed With: patient        Progress: improving  Outcome Evaluation: Pt tolerated treatment well this date. Required min-mod A for bed mobility, then mod A x2 to stand. Pt ambulated ~2ft to the chair w/ min A x2 and Rw. Instructed pt on a few seated LE exercises, and encouraged pt to stay up in chair through dinner at least.      Anticipated Discharge Disposition (PT): skilled nursing facility

## 2024-07-02 LAB
ANION GAP SERPL CALCULATED.3IONS-SCNC: 8 MMOL/L (ref 5–15)
BUN SERPL-MCNC: 15 MG/DL (ref 8–23)
BUN/CREAT SERPL: 22.4 (ref 7–25)
CALCIUM SPEC-SCNC: 9.3 MG/DL (ref 8.2–9.6)
CHLORIDE SERPL-SCNC: 101 MMOL/L (ref 98–107)
CO2 SERPL-SCNC: 25 MMOL/L (ref 22–29)
CREAT SERPL-MCNC: 0.67 MG/DL (ref 0.57–1)
DEPRECATED RDW RBC AUTO: 43.1 FL (ref 37–54)
EGFRCR SERPLBLD CKD-EPI 2021: 81.6 ML/MIN/1.73
ERYTHROCYTE [DISTWIDTH] IN BLOOD BY AUTOMATED COUNT: 12 % (ref 12.3–15.4)
GLUCOSE SERPL-MCNC: 107 MG/DL (ref 65–99)
HCT VFR BLD AUTO: 38.7 % (ref 34–46.6)
HGB BLD-MCNC: 12.7 G/DL (ref 12–15.9)
MCH RBC QN AUTO: 32 PG (ref 26.6–33)
MCHC RBC AUTO-ENTMCNC: 32.8 G/DL (ref 31.5–35.7)
MCV RBC AUTO: 97.5 FL (ref 79–97)
PLATELET # BLD AUTO: 199 10*3/MM3 (ref 140–450)
PMV BLD AUTO: 10 FL (ref 6–12)
POTASSIUM SERPL-SCNC: 4.1 MMOL/L (ref 3.5–5.2)
RBC # BLD AUTO: 3.97 10*6/MM3 (ref 3.77–5.28)
SODIUM SERPL-SCNC: 134 MMOL/L (ref 136–145)
WBC NRBC COR # BLD AUTO: 9.27 10*3/MM3 (ref 3.4–10.8)

## 2024-07-02 PROCEDURE — 97530 THERAPEUTIC ACTIVITIES: CPT

## 2024-07-02 PROCEDURE — 85027 COMPLETE CBC AUTOMATED: CPT | Performed by: STUDENT IN AN ORGANIZED HEALTH CARE EDUCATION/TRAINING PROGRAM

## 2024-07-02 PROCEDURE — 94761 N-INVAS EAR/PLS OXIMETRY MLT: CPT

## 2024-07-02 PROCEDURE — 94664 DEMO&/EVAL PT USE INHALER: CPT

## 2024-07-02 PROCEDURE — 94799 UNLISTED PULMONARY SVC/PX: CPT

## 2024-07-02 PROCEDURE — 80048 BASIC METABOLIC PNL TOTAL CA: CPT | Performed by: STUDENT IN AN ORGANIZED HEALTH CARE EDUCATION/TRAINING PROGRAM

## 2024-07-02 RX ORDER — IPRATROPIUM BROMIDE AND ALBUTEROL SULFATE 2.5; .5 MG/3ML; MG/3ML
3 SOLUTION RESPIRATORY (INHALATION) EVERY 6 HOURS PRN
Status: DISCONTINUED | OUTPATIENT
Start: 2024-07-02 | End: 2024-07-03 | Stop reason: HOSPADM

## 2024-07-02 RX ADMIN — IPRATROPIUM BROMIDE AND ALBUTEROL SULFATE 3 ML: .5; 3 SOLUTION RESPIRATORY (INHALATION) at 16:05

## 2024-07-02 RX ADMIN — ATORVASTATIN CALCIUM 10 MG: 20 TABLET, FILM COATED ORAL at 08:54

## 2024-07-02 RX ADMIN — SENNOSIDES AND DOCUSATE SODIUM 2 TABLET: 50; 8.6 TABLET ORAL at 14:54

## 2024-07-02 RX ADMIN — Medication 500 MG: at 08:54

## 2024-07-02 RX ADMIN — Medication 10 ML: at 20:14

## 2024-07-02 RX ADMIN — OXYBUTYNIN CHLORIDE 5 MG: 5 TABLET ORAL at 08:54

## 2024-07-02 RX ADMIN — ESCITALOPRAM 20 MG: 20 TABLET, FILM COATED ORAL at 08:54

## 2024-07-02 RX ADMIN — BISACODYL 5 MG: 5 TABLET, COATED ORAL at 20:20

## 2024-07-02 RX ADMIN — METOPROLOL TARTRATE 25 MG: 25 TABLET, FILM COATED ORAL at 08:54

## 2024-07-02 RX ADMIN — OXYBUTYNIN CHLORIDE 5 MG: 5 TABLET ORAL at 20:14

## 2024-07-02 RX ADMIN — METOPROLOL TARTRATE 25 MG: 25 TABLET, FILM COATED ORAL at 20:13

## 2024-07-02 RX ADMIN — Medication 10 ML: at 09:00

## 2024-07-02 RX ADMIN — ASPIRIN 81 MG: 81 TABLET, COATED ORAL at 08:54

## 2024-07-02 RX ADMIN — LEVOTHYROXINE SODIUM 75 MCG: 75 TABLET ORAL at 06:22

## 2024-07-02 NOTE — PLAN OF CARE
Goal Outcome Evaluation:  Plan of Care Reviewed With: patient, son        Progress: improving  Pt alert and oriented x 2-3, forgetful, SB/NSR, RA, no c/o pain today. Tried to get pt to BSC, stand and pivot with NA but pt was too weak to stand and foot kept sliding forward, put pt on bed pan but pt unable to have BM. I gave two senna tablets PRN r/t pt unsure of her last BM and bowel signs are audible but hypoactive. Plan is to d/c to [facility] tomorrow at 10:00 am by wheelchair van. Pt also having some audible wheezing today so PRN breathing treatments were ordered. PT was able to stand pt at the bedside with assist x2, they report her being weaker than last time they worked with her. Will continue to monitor and follow MD orders.

## 2024-07-02 NOTE — PLAN OF CARE
Goal Outcome Evaluation:  Plan of Care Reviewed With: patient           Outcome Evaluation: Pt participated with PT this PM. Pt demo increased weakness this date. Pt completed bed mobility with mod to max A x1, STS with max A x2 with B feet and knees blocked. Pt demo posterior lean in standing and unable to correct despite cues. PT will continue to follow and progress as able.      Anticipated Discharge Disposition (PT): skilled nursing facility

## 2024-07-02 NOTE — PLAN OF CARE
Goal Outcome Evaluation:      A&Ox3. Very forgetful. Requires frequent re-orientation  [place]   VSS. NSR on monitor, on RA   Audible expiratory wheezing w/ minimal activity   Continue IV ABX  Incontinent of B&B. Pure wick in place   Heavy Ax2 for chair to bed transfers, pt minimally pivots & difficulty following directions   Bottom red blanchable   Waiting on SNF placement

## 2024-07-02 NOTE — PROGRESS NOTES
Name: Marianna Terrell ADMIT: 2024   : 1931  PCP: Maya Brizuela APRN    MRN: [id number] LOS: 2 days   AGE/SEX: 89 y.o. female  ROOM: [facility]     Subjective   Subjective   Patient resting in bed, no acute events overnight. Remains pleasantly forgetful. Awaiting rehab placement.        Objective   Objective   Vital Signs  Temp:  [97.4 °F (36.3 °C)-98.2 °F (36.8 °C)] 97.4 °F (36.3 °C)  Heart Rate:  [53-64] 59  Resp:  [16] 16  BP: (148-179)/(60-87) 150/62  SpO2:  [91 %-94 %] 93 %  on   ;   Device (Oxygen Therapy): room air  Body mass index is 27.81 kg/m².  Physical Exam  Constitutional:       General: She is not in acute distress.     Appearance: She is not ill-appearing.      Comments: Elderly, frail   Cardiovascular:      Rate and Rhythm: Normal rate and regular rhythm.      Heart sounds: Murmur heard.   Pulmonary:      Effort: Pulmonary effort is normal. No respiratory distress.   Abdominal:      General: Abdomen is flat.      Palpations: Abdomen is soft.      Tenderness: There is no abdominal tenderness.   Musculoskeletal:         General: No swelling or tenderness.      Right lower leg: No edema.      Left lower leg: No edema.   Skin:     General: Skin is warm and dry.   Neurological:      General: No focal deficit present.      Mental Status: She is alert and oriented to person, place, and time. Mental status is at baseline.      Comments: Pleasantly forgetful         Results Review     I reviewed the patient's new clinical results.  Results from last 7 days   Lab Units 24  0357 24  0559 24  0515 24  0536   WBC 10*3/mm3 9.27 10.20 9.74 10.56   HEMOGLOBIN g/dL 12.7 12.9 12.5 12.8   PLATELETS 10*3/mm3 199 215 194 210     Results from last 7 days   Lab Units 24  0357 24  0559 24  0514 24  0536   SODIUM mmol/L 134* 135* 138 136   POTASSIUM mmol/L 4.1 4.3 4.5 4.5   CHLORIDE mmol/L 101 100 104 101   CO2 mmol/L 25.0 23.8 26.0 23.3   BUN mg/dL 15 19 29* 22  "  CREATININE mg/dL 0.67 0.73 0.85 0.71   GLUCOSE mg/dL 107* 106* 102* 109*   Estimated Creatinine Clearance: 51.5 mL/min (by C-G formula based on SCr of 0.67 mg/dL).  Results from last 7 days   Lab Units 06/28/24  2319   ALBUMIN g/dL 4.2   BILIRUBIN mg/dL 0.4   ALK PHOS U/L 42   AST (SGOT) U/L 11   ALT (SGPT) U/L 14     Results from last 7 days   Lab Units 07/02/24  0357 07/01/24  0559 06/30/24  0514 06/29/24  0536 06/28/24  2319   CALCIUM mg/dL 9.3 9.0 9.1 9.1 9.8*   ALBUMIN g/dL  --   --   --   --  4.2     Results from last 7 days   Lab Units 06/29/24  0045   LACTATE mmol/L 1.2   No results found for: \"COVID19\"  No results found for: \"HGBA1C\", \"POCGLU\"    Adult Transthoracic Echo Complete W/ Cont if Necessary Per Protocol    Limited image quality, interpretation possible with the use of echo   contrast agent.    Left ventricular systolic function is hyperdynamic (EF > 70%).   Calculated left ventricular EF = 76.7%    Left ventricular diastolic function is consistent with (grade I)   impaired relaxation.    Cardiac valves not well-visualized.  Aortic valve calcification noted   without hemodynamically significant stenosis on Doppler assessment.    Scheduled Medications  aspirin, 81 mg, Oral, Daily  atorvastatin, 10 mg, Oral, Daily  calcium carbonate (oyster shell), 500 mg, Oral, Daily  cefTRIAXone, 1,000 mg, Intravenous, Q24H  escitalopram, 20 mg, Oral, Daily  levothyroxine, 75 mcg, Oral, Q AM  metoprolol tartrate, 25 mg, Oral, Q12H  oxybutynin, 5 mg, Oral, BID  sodium chloride, 10 mL, Intravenous, Q12H    Infusions   Diet  Diet: Regular/House; Fluid Consistency: Thin (IDDSI 0)         I have personally reviewed:  [x]  Laboratory   []  Microbiology   []  Radiology   [x]  EKG/Telemetry   []  Cardiology/Vascular   []  Pathology   []  Records    Assessment/Plan     Active Hospital Problems    Diagnosis  POA    **UTI (urinary tract infection) [N39.0]  Yes    Dementia [F03.90]  Unknown    Hypothyroidism (acquired) " [E03.9]  Unknown    CKD (chronic kidney disease) [N18.9]  Unknown    Fall [W19.XXXA]  Unknown    Hyperlipidemia [E78.5]  Unknown      Resolved Hospital Problems   No resolved problems to display.       89 y.o. female admitted with UTI (urinary tract infection).    Generalized weakness/age related physical debility  - had hoped pt could get back to skilled nursing but PT note this am indicates pt was max-assist for transfer from bed to chair.   - referrals sent out yesterday; dw CCP    UTI with E coli - completed course of abx.   Overactive bladder  -Urine cx w/ >100k E coli- sensitive to rocephin- completed 3/3 days   - blood cx NGTD  -WBC normalized  - home oxybutynin contined     History of cerebral ventriculomegaly/Frequent falls-outpatient neurology notes reviewed - previously worked up b for frequent falls- dx with orthostatic hypotension; rec walker  -PT/OT to see  -CT head negative for any acute fractures  - pt does have fairly loud systolic murmur on exam; she does struggle to tell details of the fall- last echo at [place] 2021 without significant valvular dz- echo done- no obvious cause of syncope     CKD3  -Creatinine 0.7, stable/at baseline     HLD  -On statin     HTN  -Metoprolol restarted     Hypothyroidism  -Continue Synthroid     Migraine/history of     SCDs for DVT ppx given propensity to falling.     SCDs for DVT prophylaxis.  Full code.  Discussed with patient RN CCP  Anticipated discharge SNF, once arrangements made        Makenzie Jacobs MD  [facility]  07/02/24  11:08 EDT    I wore protective equipment throughout this patient encounter including gloves.  Hand hygiene was performed before donning protective equipment and after removal when leaving the room.         Copied text in this note has been reviewed and is accurate as of 07/02/24.

## 2024-07-02 NOTE — THERAPY TREATMENT NOTE
Patient Name: Marianna Terrell  : 1931    MRN: [id number]                              Today's Date: 2024       Admit Date: 2024    Visit Dx:     ICD-10-CM ICD-9-CM   1. Acute cystitis without hematuria  N30.00 595.0   2. Closed head injury, initial encounter  S09.90XA 959.01   3. Expiratory stridor  R06.1 786.1     Patient Active Problem List   Diagnosis    UTI (urinary tract infection)    Dementia    Hypothyroidism (acquired)    CKD (chronic kidney disease)    Fall    Hyperlipidemia     Past Medical History:   Diagnosis Date    Anxiety     Chronic kidney disease (CKD), stage IV (severe)     Dementia     Difficulty walking     High cholesterol     HL (hearing loss)     Hypothyroid      Past Surgical History:   Procedure Laterality Date    CYSTECTOMY      HYSTERECTOMY      KNEE SURGERY        General Information       Row Name 24 1411          Physical Therapy Time and Intention    Document Type therapy note (daily note)  -CW     Mode of Treatment physical therapy;individual therapy  -CW       Row Name 24 1411          General Information    Patient Profile Reviewed yes  -CW     Existing Precautions/Restrictions fall  -CW       Row Name 24 1411          Cognition    Orientation Status (Cognition) oriented to;person;place;time  -CW       Row Name 24 1411          Safety Issues, Functional Mobility    Safety Issues Affecting Function (Mobility) insight into deficits/self-awareness;judgment;awareness of need for assistance;problem-solving  -CW     Impairments Affecting Function (Mobility) endurance/activity tolerance;strength;range of motion (ROM);postural/trunk control;balance;shortness of breath  -CW               User Key  (r) = Recorded By, (t) = Taken By, (c) = Cosigned By      Initials Name Provider Type    CW Emmy Brunner, LAURIE Physical Therapist                   Mobility       Row Name 24 1412          Bed Mobility    Bed Mobility supine-sit  -CW      Supine-Sit [place] (Bed Mobility) moderate assist (50% patient effort);1 person assist;minimum assist (75% patient effort);verbal cues  -CW     Sit-Supine [place] (Bed Mobility) 1 person assist;verbal cues;maximum assist (25% patient effort)  -CW     Assistive Device (Bed Mobility) bed rails;head of bed elevated  -CW       Row Name 07/02/24 1412          Sit-Stand Transfer    Sit-Stand [place] (Transfers) maximum assist (25% patient effort);2 person assist;verbal cues  -CW     Comment, (Sit-Stand Transfer) B feet and knees blocked, posterior lean in standing  -CW       Row Name 07/02/24 1412          Gait/Stairs (Locomotion)    [place] Level (Gait) unable to assess  -CW               User Key  (r) = Recorded By, (t) = Taken By, (c) = Cosigned By      Initials Name Provider Type    Emmy Landry PT Physical Therapist                   Obj/Interventions       Row Name 07/02/24 1528          Balance    Balance Assessment standing static balance;standing dynamic balance  -CW     Static Standing Balance minimal assist;verbal cues  -CW     Dynamic Standing Balance minimal assist;verbal cues  -CW     Position/Device Used, Standing Balance supported;walker, front-wheeled  -CW               User Key  (r) = Recorded By, (t) = Taken By, (c) = Cosigned By      Initials Name Provider Type    Emmy Landry PT Physical Therapist                   Goals/Plan    No documentation.                  Clinical Impression       Row Name 07/02/24 1530          Pain    Pretreatment Pain Rating 0/10 - no pain  -CW     Posttreatment Pain Rating 0/10 - no pain  -CW       Row Name 07/02/24 1530          Plan of Care Review    Plan of Care Reviewed With patient  -CW     Outcome Evaluation Pt participated with PT this PM. Pt demo increased weakness this date. Pt completed bed mobility with mod to max A x1, STS with max A x2 with B feet and knees blocked. Pt demo posterior lean in standing and unable to correct  despite cues. PT will continue to follow and progress as able.  -CW       Row Name 07/02/24 1530          Vital Signs    O2 Delivery Pre Treatment room air  -CW       Row Name 07/02/24 1530          Positioning and Restraints    Pre-Treatment Position in bed  -CW     Post Treatment Position bed  -CW     In Bed notified nsg;call light within reach;encouraged to call for assist;exit alarm on;fowlers;side rails up x3  -CW               User Key  (r) = Recorded By, (t) = Taken By, (c) = Cosigned By      Initials Name Provider Type    Emmy Landry, LAURIE Physical Therapist                   Outcome Measures       Row Name 07/02/24 1532 07/02/24 0800       How much help from another person do you currently need...    Turning from your back to your side while in flat bed without using bedrails? 2  -CW 3  -CM    Moving from lying on back to sitting on the side of a flat bed without bedrails? 2  -CW 2  -CM    Moving to and from a bed to a chair (including a wheelchair)? 1  -CW 2  -CM    Standing up from a chair using your arms (e.g., wheelchair, bedside chair)? 2  -CW 2  -CM    Climbing 3-5 steps with a railing? 1  -CW 1  -CM    To walk in hospital room? 1  -CW 1  -CM    AM-PAC 6 Clicks Score (PT) 9  -CW 11  -CM    Highest Level of Mobility Goal 3 --> Sit at edge of bed  -CW 4 --> Transfer to chair/commode  -CM      Row Name 07/02/24 1532          Functional Assessment    Outcome Measure Options AM-PAC 6 Clicks Basic Mobility (PT)  -CW               User Key  (r) = Recorded By, (t) = Taken By, (c) = Cosigned By      Initials Name Provider Type    Shima Kraft, RN Registered Nurse    Emmy Landry, LAURIE Physical Therapist                                 Physical Therapy Education       Title: PT OT SLP Therapies (In Progress)       Topic: Physical Therapy (Done)       Point: Mobility training (Done)       Learning Progress Summary             Patient Acceptance, E, VU,NR by MICAH at 7/2/2024 1532    Acceptance,  E,TB,D, VU,NR by  at 7/1/2024 1616    Acceptance, E,TB, VU,NR by  at 6/30/2024 1016                         Point: Home exercise program (Done)       Learning Progress Summary             Patient Acceptance, E,TB,D, VU,NR by  at 7/1/2024 1616    Acceptance, E,TB, VU,NR by  at 6/30/2024 1016                         Point: Body mechanics (Done)       Learning Progress Summary             Patient Acceptance, E,TB,D, VU,NR by  at 7/1/2024 1616    Acceptance, E,TB, VU,NR by  at 6/30/2024 1016                         Point: Precautions (Done)       Learning Progress Summary             Patient Acceptance, E,TB,D, VU,NR by  at 7/1/2024 1616    Acceptance, E,TB, VU,NR by  at 6/30/2024 1016                                         User Key       Initials Effective Dates Name Provider Type Discipline     03/07/18 -  Yamilka Mcrae, PTA Physical Therapist Assistant PT     07/11/23 -  Jean Pierre Pagan, PT Physical Therapist PT     12/13/22 -  Emmy Brunner PT Physical Therapist PT                  PT Recommendation and Plan     Plan of Care Reviewed With: patient  Outcome Evaluation: Pt participated with PT this PM. Pt demo increased weakness this date. Pt completed bed mobility with mod to max A x1, STS with max A x2 with B feet and knees blocked. Pt demo posterior lean in standing and unable to correct despite cues. PT will continue to follow and progress as able.     Time Calculation:         PT Charges       Row Name 07/02/24 1532             Time Calculation    Start Time 1401  -CW      Stop Time 1420  -CW      Time Calculation (min) 19 min  -CW      PT Received On 07/02/24  -CW      PT - Next Appointment 07/03/24  -                User Key  (r) = Recorded By, (t) = Taken By, (c) = Cosigned By      Initials Name Provider Type    Emmy Landry, LAURIE Physical Therapist                  Therapy Charges for Today       Code Description Service Date Service Provider Modifiers Qty     27536278116 HC PT THERAPEUTIC ACT EA 15 MIN 7/2/2024 Emmy Brunner, PT GP 1    83974303723 HC PT THER SUPP EA 15 MIN 7/2/2024 Emmy Brunner, PT GP 1            PT G-Codes  Outcome Measure Options: AM-PAC 6 Clicks Basic Mobility (PT)  AM-PAC 6 Clicks Score (PT): 9  AM-PAC 6 Clicks Score (OT): 16  Modified [place] Scale: 4 - Moderately severe disability.  Unable to walk without assistance, and unable to attend to own bodily needs without assistance.  PT Discharge Summary  Anticipated Discharge Disposition (PT): skilled nursing facility    Emmy Brunner PT  7/2/2024

## 2024-07-02 NOTE — CASE MANAGEMENT/SOCIAL WORK
Continued Stay Note  [facility]     Patient Name: Marianna Terrell  MRN: [id number]  Today's Date: 7/2/2024    Admit Date: 6/28/2024    Plan: [facility]   Discharge Plan       Row Name 07/02/24 1414       Plan    Plan [facility]    Patient/Family in Agreement with Plan yes    Plan Comments Spoke with [facility]/ [facility]. [facility] can accept and has a bed available tomorrow. Called pt's son Pineda and he is agreeable to pt going to [facility]. Called and left a message for [facility], where the pt lives, to update them. Pt will need a wheelchair van for transportation. Called and left a message for EMS. No further needs indentified at this time. CCP following. TU Lindo RN                   Discharge Codes    No documentation.                 Expected Discharge Date and Time       Expected Discharge Date Expected Discharge Time    Jul 3, 2024               Srinivas Alba RN

## 2024-07-03 VITALS
HEIGHT: 64 IN | RESPIRATION RATE: 21 BRPM | HEART RATE: 60 BPM | OXYGEN SATURATION: 92 % | TEMPERATURE: 97.8 F | SYSTOLIC BLOOD PRESSURE: 174 MMHG | WEIGHT: 162 LBS | BODY MASS INDEX: 27.66 KG/M2 | DIASTOLIC BLOOD PRESSURE: 95 MMHG

## 2024-07-03 RX ADMIN — BISACODYL 10 MG: 10 SUPPOSITORY RECTAL at 08:03

## 2024-07-03 RX ADMIN — SENNOSIDES AND DOCUSATE SODIUM 2 TABLET: 50; 8.6 TABLET ORAL at 08:03

## 2024-07-03 RX ADMIN — OXYBUTYNIN CHLORIDE 5 MG: 5 TABLET ORAL at 08:03

## 2024-07-03 RX ADMIN — Medication 500 MG: at 08:03

## 2024-07-03 RX ADMIN — LEVOTHYROXINE SODIUM 75 MCG: 75 TABLET ORAL at 05:03

## 2024-07-03 RX ADMIN — METOPROLOL TARTRATE 25 MG: 25 TABLET, FILM COATED ORAL at 08:03

## 2024-07-03 RX ADMIN — ASPIRIN 81 MG: 81 TABLET, COATED ORAL at 08:03

## 2024-07-03 RX ADMIN — ATORVASTATIN CALCIUM 10 MG: 20 TABLET, FILM COATED ORAL at 08:03

## 2024-07-03 RX ADMIN — POLYETHYLENE GLYCOL 3350 17 G: 17 POWDER, FOR SOLUTION ORAL at 08:03

## 2024-07-03 NOTE — DISCHARGE SUMMARY
Patient Name: Marianna Terrell  : 1931  MRN: [id number]    Date of Admission: 2024  Date of Discharge:  7/3/2024  Primary Care Physician: Maya Brizuela APRN      Chief Complaint:   Fall      Discharge Diagnoses     Active Hospital Problems    Diagnosis  POA    **UTI (urinary tract infection) [N39.0]  Yes    Dementia [F03.90]  Unknown    Hypothyroidism (acquired) [E03.9]  Unknown    CKD (chronic kidney disease) [N18.9]  Unknown    Fall [W19.XXXA]  Unknown    Hyperlipidemia [E78.5]  Unknown      Resolved Hospital Problems   No resolved problems to display.        Hospital Course     Ms. Terrell is a 89 y.o. female with a history of CKD, hypertension, hypothyroidism who presented to [facility] initially complaining of mechanical fall.  Please see the admitting history and physical for further details.  She was found to have UTI and was admitted to the hospital for further evaluation and treatment.  Urine culture collected and the patient was initiated on broad-spectrum antibiotics with Rocephin.  Ultimately the patient had E. coli growing in her urine, she completed a course of ceftriaxone while admitted and is doing well.  The patient does have a history of frequent falls, she was noted to have a systolic murmur on exam so an echo was performed without significant valvular disease.  She reports that she has had a cardiac murmur for many years.  Initially the plan was to return home to assisted living however the patient worked with physical therapy while admitted and it was determined she would benefit from discharging to rehab prior to returning to her living facility.  She should follow-up with her PCP in a week for posthospital follow-up visit.    Day of Discharge     Subjective:  Sitting up in bed, eating breakfast.  She remains pleasantly forgetful.    Physical Exam:  Temp:  [97.3 °F (36.3 °C)-97.8 °F (36.6 °C)] 97.8 °F (36.6 °C)  Heart Rate:  [54-62] 60  Resp:  [16-21] 21  BP:  (104-174)/(60-95) 174/95  Body mass index is 27.81 kg/m².  Physical Exam  Constitutional:       General: She is not in acute distress.     Appearance: She is not ill-appearing.      Comments: Elderly, frail   Cardiovascular:      Rate and Rhythm: Normal rate and regular rhythm.      Heart sounds: Murmur heard.   Pulmonary:      Effort: Pulmonary effort is normal. No respiratory distress.   Abdominal:      General: Abdomen is flat.      Palpations: Abdomen is soft.      Tenderness: There is no abdominal tenderness.   Musculoskeletal:         General: No swelling or tenderness.      Right lower leg: No edema.      Left lower leg: No edema.   Skin:     General: Skin is warm and dry.   Neurological:      General: No focal deficit present.      Mental Status: She is alert and oriented to person, place, and time. Mental status is at baseline.      Comments: Pleasantly forgetful   Consultants     Consult Orders (all) (From admission, onward)       Start     Ordered    06/30/24 0737  Inpatient Case Management  Consult  Once        Provider:  (Not yet assigned)    06/30/24 0736    06/29/24 1624  Inpatient Case Management  Consult  Once        Provider:  (Not yet assigned)    06/29/24 1624    06/29/24 0507  Inpatient Case Management  Consult  Once        Provider:  (Not yet assigned)    06/29/24 0506    06/29/24 0136  [facility] (on-call MD unless specified) Details  Once        Specialty:  Hospitalist  Provider:  (Not yet assigned)    06/29/24 0135                  Procedures     Imaging Results (All)       Procedure Component Value Units Date/Time    CT Soft Tissue Neck With Contrast [[id number]] Collected: 06/29/24 0443     Updated: 06/29/24 0443    Narrative:        Patient: CHARLENE GARCIA  Time Out: 04:42  Exam(s): CT NECK With Contrast     EXAM:    CT Neck With Intravenous Contrast    CLINICAL HISTORY:     Reason for exam: upper airway stridor.    TECHNIQUE:    Axial computed tomography  images of the neck with intravenous contrast.    CTDI is 13.48 mGy and DLP is 330.7 mGy-cm.  This CT exam was performed   according to the principle of ALARA (As Low As Reasonably Achievable) by   using one or more of the following dose reduction techniques: automated   exposure control, adjustment of the mA and or kV according to patient   size, and or use of iterative reconstruction technique.    COMPARISON:    No relevant prior studies available.    FINDINGS:    Oropharynx:  Unremarkable.  No significant tonsillar enlargement.  No   peritonsillar abscess.    Hypopharynx:  Unremarkable.    Larynx:  Unremarkable.  Normal epiglottis.    Trachea:  Unremarkable.    Retropharyngeal space:  Unremarkable.    Submandibular parotid glands:  Unremarkable.  Glands are normal in size.      Thyroid:  Unremarkable.  No enlarged or calcified nodules.    Bones joints:  Degenerative changes in the spine.  No acute fracture.    Soft tissues:  Unremarkable.    Vasculature:  Atherosclerotic disease.    Lymph nodes:  Unremarkable.  No lymphadenopathy.    Dental:  Motion artifact and dental amalgam mildly limit evaluation.    Lung apices:  Unremarkable as visualized.    Other findings:  Patent airway.    IMPRESSION:       1.  Motion artifact and dental amalgam mildly limit evaluation.  2.  No acute findings on CT neck.      Impression:          Electronically signed by Malvin Zayas MD on 06-29-24 at 0442    XR Chest 1 View [[id number]] Collected: 06/29/24 0103     Updated: 06/29/24 0103    Narrative:        Patient: CHARLENE GARCIA  Time Out: 01:03  Exam(s): XR CXR 1 VIEW     EXAM:    XR Chest, 1 View    CLINICAL HISTORY:     Reason for exam: Fall, shortness of breath.    TECHNIQUE:    Frontal view of the chest.    COMPARISON:    No previous studies.    FINDINGS:    Lungs:  Unremarkable.  No consolidation.    Pleural space:  Unremarkable.  No pneumothorax.    Heart:  Heart is normal in size.  No cardiomegaly.    Mediastinum:   Unremarkable.  Normal mediastinal contour.    Bones joints:  Osteopenia.  No acute fracture.    Vasculature:  Atherosclerotic disease.    Other findings:  Hypoaeration.    IMPRESSION:       1.  Marked hypoaeration.  2.  Atherosclerotic disease.  3.  No consolidative changes or pleural effusions.  4.  Osteopenia.      Impression:          Electronically signed by Eliseo eVntura MD on 06-29-24 at 0103    CT Head Without Contrast [[id number]] Collected: 06/28/24 2250     Updated: 06/28/24 2305    Narrative:      CT HEAD AND CERVICAL SPINE WITHOUT CONTRAST     CLINICAL HISTORY: Fall. Hit the back of her head. Neck is sore all over     TECHNIQUE: CT scan of the head was obtained with 3 mm axial soft tissue  and 2 mm bone algorithm images. No intravenous contrast was  administered. Sagittal and coronal reconstructions were obtained.     COMPARISON: None.     FINDINGS:       There is no evidence for an acute extra-axial hemorrhage or a calvarial  fracture.     Moderate changes of chronic small vessel ischemic phenomena are noted.  The ventricles, sulci, and cisterns are age-appropriate. The gray-white  matter differentiation is within normal limits. The basal ganglia and  thalami are unremarkable in appearance. The posterior fossa structures  are within normal limits.     Incidental note is made of a small to moderate size right parietal scalp  hematoma.       Impression:         No evidence for acute traumatic intracranial pathology.        TECHNIQUE: CT scan of the cervical spine was obtained with 1 mm axial  bone algorithm and 2 mm axial soft tissue algorithm images. Sagittal and  coronal reconstructed images were obtained.     FINDINGS:     There is no evidence for acute fracture or bony malalignment involving  the cervical spine.     Incidental degenerative phenomena are appreciated within the cervical  spine with disc osteophyte complexes at C5-6 and C6 resulting in up to  moderate degrees of stenoses. Multilevel  foraminal stenotic changes are  also noted from C3-4 down to C6-7.     IMPRESSION:     No evidence for acute fracture or bony malalignment involving the  cervical spine.     Incidental degenerative phenomena as discussed above.           Radiation dose reduction techniques were utilized, including automated  exposure control and exposure modulation based on body size.     This report was finalized on 6/28/2024 11:02 PM by Dr. Anil Hernandez M.D  on Workstation: [id number]       CT Cervical Spine Without Contrast [[id number]] Collected: 06/28/24 2250     Updated: 06/28/24 2305    Narrative:      CT HEAD AND CERVICAL SPINE WITHOUT CONTRAST     CLINICAL HISTORY: Fall. Hit the back of her head. Neck is sore all over     TECHNIQUE: CT scan of the head was obtained with 3 mm axial soft tissue  and 2 mm bone algorithm images. No intravenous contrast was  administered. Sagittal and coronal reconstructions were obtained.     COMPARISON: None.     FINDINGS:       There is no evidence for an acute extra-axial hemorrhage or a calvarial  fracture.     Moderate changes of chronic small vessel ischemic phenomena are noted.  The ventricles, sulci, and cisterns are age-appropriate. The gray-white  matter differentiation is within normal limits. The basal ganglia and  thalami are unremarkable in appearance. The posterior fossa structures  are within normal limits.     Incidental note is made of a small to moderate size right parietal scalp  hematoma.       Impression:         No evidence for acute traumatic intracranial pathology.        TECHNIQUE: CT scan of the cervical spine was obtained with 1 mm axial  bone algorithm and 2 mm axial soft tissue algorithm images. Sagittal and  coronal reconstructed images were obtained.     FINDINGS:     There is no evidence for acute fracture or bony malalignment involving  the cervical spine.     Incidental degenerative phenomena are appreciated within the cervical  spine with disc osteophyte  "complexes at C5-6 and C6 resulting in up to  moderate degrees of stenoses. Multilevel foraminal stenotic changes are  also noted from C3-4 down to C6-7.     IMPRESSION:     No evidence for acute fracture or bony malalignment involving the  cervical spine.     Incidental degenerative phenomena as discussed above.           Radiation dose reduction techniques were utilized, including automated  exposure control and exposure modulation based on body size.     This report was finalized on 6/28/2024 11:02 PM by Dr. Anil Hernandez M.D  on Workstation: [id number]               Pertinent Labs     Results from last 7 days   Lab Units 07/02/24 0357 07/01/24  0559 06/30/24  0515 06/29/24  0536   WBC 10*3/mm3 9.27 10.20 9.74 10.56   HEMOGLOBIN g/dL 12.7 12.9 12.5 12.8   PLATELETS 10*3/mm3 199 215 194 210     Results from last 7 days   Lab Units 07/02/24 0357 07/01/24 0559 06/30/24  0514 06/29/24  0536   SODIUM mmol/L 134* 135* 138 136   POTASSIUM mmol/L 4.1 4.3 4.5 4.5   CHLORIDE mmol/L 101 100 104 101   CO2 mmol/L 25.0 23.8 26.0 23.3   BUN mg/dL 15 19 29* 22   CREATININE mg/dL 0.67 0.73 0.85 0.71   GLUCOSE mg/dL 107* 106* 102* 109*   Estimated Creatinine Clearance: 51.5 mL/min (by C-G formula based on SCr of 0.67 mg/dL).  Results from last 7 days   Lab Units 06/28/24  2319   ALBUMIN g/dL 4.2   BILIRUBIN mg/dL 0.4   ALK PHOS U/L 42   AST (SGOT) U/L 11   ALT (SGPT) U/L 14     Results from last 7 days   Lab Units 07/02/24 0357 07/01/24 0559 06/30/24  0514 06/29/24  0536 06/28/24  2319   CALCIUM mg/dL 9.3 9.0 9.1 9.1 9.8*   ALBUMIN g/dL  --   --   --   --  4.2       Results from last 7 days   Lab Units 06/29/24 0536 06/29/24  0123 06/28/24  2319   HSTROP T ng/L 22* 23* 23*   PROBNP pg/mL  --   --  193.0           Invalid input(s): \"LDLCALC\"  Results from last 7 days   Lab Units 06/29/24  0536 06/28/24  2346   BLOODCX  No growth at 2 days  --    URINECX   --  >100,000 CFU/mL Escherichia coli*       Test Results Pending at " Discharge     Pending Labs       Order Current Status    Blood Culture - Blood, Arm, Right Preliminary result            Discharge Details        Discharge Medications        Changes to Medications        Instructions Start Date   metoprolol tartrate 25 MG tablet  Commonly known as: LOPRESSOR  What changed:   how much to take  how to take this  when to take this   25 mg, Oral, Every 12 Hours Scheduled             Continue These Medications        Instructions Start Date   aspirin 81 MG tablet   81 mg, Oral      calcium citrate-vitamin d 200-250 MG-UNIT tablet tablet  Commonly known as: CITRACAL   1 tablet, Oral, Daily      escitalopram 5 MG tablet  Commonly known as: LEXAPRO   20 mg, Oral      fish oil 1200 MG capsule capsule   1 capsule, Oral, Daily      levothyroxine 75 MCG tablet  Commonly known as: SYNTHROID, LEVOTHROID   75 mcg, Oral, Daily      multivitamin tablet  Generic drug: multivitamin   1 tablet, Oral, Daily      oxybutynin 5 MG tablet  Commonly known as: DITROPAN   5 mg, Oral, 2 Times Daily      sennosides-docusate 8.6-50 MG per tablet  Commonly known as: PERICOLACE   2 tablets, Oral      simvastatin 20 MG tablet  Commonly known as: ZOCOR   20 mg, Oral, Daily      vitamin E 400 UNIT capsule   400 Units, Oral             Stop These Medications      alendronate 70 MG tablet  Commonly known as: FOSAMAX              No Known Allergies      Discharge Disposition:  Home or Self Care    Discharge Diet:  Diet Order   Procedures    Diet: Regular/House; Fluid Consistency: Thin (IDDSI 0)       Discharge Activity:   Activity Instructions       Activity as Tolerated              CODE STATUS:    Code Status and Medical Interventions:   Ordered at: 06/29/24 0255     Code Status (Patient has no pulse and is not breathing):    CPR (Attempt to Resuscitate)     Medical Interventions (Patient has pulse or is breathing):    Full Support       No future appointments.  Additional Instructions for the Follow-ups that You Need  to Schedule       Discharge Follow-up with PCP   As directed       Currently Documented PCP:    Maya Brizuela APRN    PCP Phone Number:    [phone]     Follow Up Details: post-hospital follow up               Contact information for follow-up providers       Maya Brizuela APRN .    Specialty: Nurse Practitioner  Why: post-hospital follow up  Contact information:  [address]  [address]  [place] [address]  [phone]                       Contact information for after-discharge care       Destination       [facility] .    Service: Skilled Nursing  Contact information:  [address] [address] [phone]                                   Additional Instructions for the Follow-ups that You Need to Schedule       Discharge Follow-up with PCP   As directed       Currently Documented PCP:    Maya Brizuela APRN    PCP Phone Number:    [phone]     Follow Up Details: post-hospital follow up            Time Spent on Discharge:  I spent greater than 30 minutes on this discharge activity which included: face-to-face encounter with the patient, reviewing the data in the system, coordination of the care with the nursing staff as well as consultants, documentation, and entering orders.       Makenzie Jacobs MD  [facility]  07/03/24  08:38 EDT

## 2024-07-03 NOTE — PLAN OF CARE
Goal Outcome Evaluation:        Problem: Adult Inpatient Plan of Care  Goal: Plan of Care Review  Outcome: Ongoing, Progressing  Flowsheets (Taken 7/3/2024 0435)  Progress: improving  Plan of Care Reviewed With: patient  Outcome Evaluation:   Pt A&Ox3 (confused to time, partially aware of situation - knows she fell)   R/A, NSR, Q2 hr turns, pain denied.  No BM charted since 06/28 on DOA. Dayshift RN yesterday gave pericolace PRN dose and this RN gave ducolax PRN dose and encouraged oral intake of fluids   no results /ineffective so far.  Bowel sounds hypoactive but present.  SCDs on throughout this shift.  Purewick in use for excessive weakness of pt - she is reported to be a heavy x2 assist to pivot and turn.  Good UOP.  Heels elevated on pillow and accumax pump in use.  Plan to d/c to [facility] this AM by W/C van at 1000 but no BM may result in delay of d/c.  Goal: Absence of Hospital-Acquired Illness or Injury  Outcome: Ongoing, Progressing  Intervention: Identify and Manage Fall Risk  Description: Perform standard risk assessment on admission using a validated tool or comprehensive approach appropriate to the patient; reassess fall risk frequently, with change in status or transfer to another level of care.  Communicate fall injury risk to interprofessional healthcare team.  Determine need for increased observation, equipment and environmental modification, such as low bed, signage and supportive, nonskid footwear.  Adjust safety measures to individual developmental age, stage and identified risk factors.  Reinforce the importance of safety and physical activity with patient and family.  Perform regular intentional rounding to assess need for position change, pain assessment and personal needs, including assistance with toileting.  Recent Flowsheet Documentation  Taken 7/3/2024 0402 by Shima Zendejas, RN  Safety Promotion/Fall Prevention: safety round/check completed  Taken 7/3/2024 0204 by Aashish  JUD Ronquillo  Safety Promotion/Fall Prevention: safety round/check completed  Taken 7/2/2024 2358 by Shima Zendejas RN  Safety Promotion/Fall Prevention: safety round/check completed  Taken 7/2/2024 2155 by Shima Zendejas RN  Safety Promotion/Fall Prevention: safety round/check completed  Taken 7/2/2024 2012 by Shima Zendejas RN  Safety Promotion/Fall Prevention: safety round/check completed  Intervention: Prevent Skin Injury  Description: Perform a screening for skin injury risk, such as pressure or moisture associated skin damage on admission and at regular intervals throughout hospital stay.  Keep all areas of skin (especially folds) clean and dry.  Maintain adequate skin hydration.  Relieve and redistribute pressure and protect bony prominences; implement measures based on patient-specific risk factors.  Match turning and repositioning schedule to clinical condition.  Encourage weight shift frequently; assist with reposition if unable to complete independently.  Float heels off bed; avoid pressure on the Achilles tendon.  Keep skin free from extended contact with medical devices.  Encourage functional activity and mobility, as early as tolerated.  Use aids (e.g., slide boards, mechanical lift) during transfer.  Recent Flowsheet Documentation  Taken 7/3/2024 0402 by Shima Zendejas RN  Body Position:   supine, legs elevated   tilted  Taken 7/3/2024 0204 by Shima Zendejas RN  Body Position:   turned   left  Taken 7/2/2024 2358 by Shima Zendejas RN  Body Position:   turned   right  Skin Protection:   adhesive use limited   transparent dressing maintained   tubing/devices free from skin contact   incontinence pads utilized  Taken 7/2/2024 2155 by Shima Zendejas RN  Body Position:   turned   left  Taken 7/2/2024 2012 by Shima Zendejas RN  Body Position: sitting up in bed  Skin Protection:   adhesive use limited   incontinence pads utilized   transparent dressing maintained   tubing/devices  free from skin contact  Intervention: Prevent and Manage VTE (Venous Thromboembolism) Risk  Description: Assess for VTE (venous thromboembolism) risk.  Encourage and assist with early ambulation.  Initiate and maintain compression or other therapy, as indicated, based on identified risk in accordance with organizational protocol and provider order.  Encourage both active and passive leg exercises while in bed, if unable to ambulate.  Recent Flowsheet Documentation  Taken 7/2/2024 2358 by Shima Zendejas, RN  Activity Management: activity encouraged  VTE Prevention/Management:   bilateral   sequential compression devices on  Taken 7/2/2024 2012 by Shima Zendejas RN  Activity Management: activity encouraged  VTE Prevention/Management:   bilateral   sequential compression devices on  Intervention: Prevent Infection  Description: Maintain skin and mucous membrane integrity; promote hand, oral and pulmonary hygiene.  Optimize fluid balance, nutrition, sleep and glycemic control to maximize infection resistance.  Identify potential sources of infection early to prevent or mitigate progression of infection (e.g., wound, lines, devices).  Evaluate ongoing need for invasive devices; remove promptly when no longer indicated.  Recent Flowsheet Documentation  Taken 7/3/2024 0402 by Shima Zendejas, RN  Infection Prevention:   personal protective equipment utilized   single patient room provided   visitors restricted/screened   rest/sleep promoted   hand hygiene promoted  Taken 7/3/2024 0204 by Shima Zendejas, RN  Infection Prevention:   rest/sleep promoted   single patient room provided   visitors restricted/screened   personal protective equipment utilized   hand hygiene promoted  Taken 7/2/2024 2358 by Shima Zendejas, RN  Infection Prevention:   rest/sleep promoted   single patient room provided   visitors restricted/screened   personal protective equipment utilized   hand hygiene promoted  Taken 7/2/2024 2155 by  Shima Zendejas, RN  Infection Prevention:   rest/sleep promoted   single patient room provided   visitors restricted/screened   personal protective equipment utilized   hand hygiene promoted  Taken 7/2/2024 2012 by Shima Zendejas RN  Infection Prevention:   single patient room provided   visitors restricted/screened   hand hygiene promoted   personal protective equipment utilized   rest/sleep promoted  Goal: Optimal Comfort and Wellbeing  Outcome: Ongoing, Progressing  Intervention: Provide Person-Centered Care  Description: Use a family-focused approach to care.  Develop trust and rapport by proactively providing information, encouraging questions, addressing concerns and offering reassurance.  Acknowledge emotional response to hospitalization.  Recognize and utilize personal coping strategies.  Honor spiritual and cultural preferences.  Recent Flowsheet Documentation  Taken 7/2/2024 2358 by Shima Zendejas, RN  Trust Relationship/Rapport:   care explained   choices provided   questions answered   questions encouraged   reassurance provided   thoughts/feelings acknowledged   empathic listening provided  Taken 7/2/2024 2012 by Shima Zendejas, RN  Trust Relationship/Rapport:   care explained   choices provided   questions answered   questions encouraged   thoughts/feelings acknowledged   reassurance provided   empathic listening provided  Goal: Readiness for Transition of Care  Outcome: Ongoing, Progressing     Problem: Fall Injury Risk  Goal: Absence of Fall and Fall-Related Injury  Outcome: Ongoing, Progressing  Intervention: Identify and Manage Contributors  Description: Develop a fall prevention plan with the patient and caregiver/family.  Provide reorientation, appropriate sensory stimulation and routines with changes in mental status to decrease risk of fall.  Promote use of personal vision and auditory aids.  Assess assistance level required for safe and effective self-care; provide support as needed,  such as toileting, mobilization. For age 65 and older, implement timed toileting with assistance.  Encourage physical activity, such as performance of mobility and self-care at highest level of patient ability, multicomponent exercise program and provision of appropriate assistive devices.  If fall occurs, assess the severity of injury; implement fall injury protocol. Determine the cause and revise fall injury prevention plan.  Regularly review medication contribution to fall risk; adjust medication administration times to minimize risk of falling.  Consider risk related to polypharmacy and age.  Balance adequate pain management with potential for oversedation.  Recent Flowsheet Documentation  Taken 7/3/2024 0402 by Shima Zendejas, RN  Medication Review/Management: medications reviewed  Taken 7/3/2024 0204 by Shima Zendejas, RN  Medication Review/Management:   medications reviewed   high-risk medications identified  Taken 7/2/2024 2358 by Shima Zendejas, RN  Medication Review/Management:   medications reviewed   high-risk medications identified  Self-Care Promotion:   BADL personal objects within reach   independence encouraged   safe use of adaptive equipment encouraged  Taken 7/2/2024 2155 by Shima Zendejas, RN  Medication Review/Management: medications reviewed  Taken 7/2/2024 2012 by Shima Zendejas, RN  Medication Review/Management:   medications reviewed   high-risk medications identified  Self-Care Promotion:   independence encouraged   BADL personal objects within reach   safe use of adaptive equipment encouraged  Intervention: Promote Injury-Free Environment  Description: Provide a safe, barrier-free environment that encourages independent activity.  Keep care area uncluttered and well-lighted.  Determine need for increased observation or monitoring.  Avoid use of devices that minimize mobility, such as restraints or indwelling urinary catheter.  Recent Flowsheet Documentation  Taken 7/3/2024 0402  by Wells, Shima, RN  Safety Promotion/Fall Prevention: safety round/check completed  Taken 7/3/2024 0204 by Shima Zendejas RN  Safety Promotion/Fall Prevention: safety round/check completed  Taken 7/2/2024 2358 by Shima Zendejas RN  Safety Promotion/Fall Prevention: safety round/check completed  Taken 7/2/2024 2155 by Shima Zendejas RN  Safety Promotion/Fall Prevention: safety round/check completed  Taken 7/2/2024 2012 by Shima Zendejas RN  Safety Promotion/Fall Prevention: safety round/check completed     Problem: Skin Injury Risk Increased  Goal: Skin Health and Integrity  Outcome: Ongoing, Progressing  Intervention: Promote and Optimize Oral Intake  Description: Perform a nutrition assessment; include a nutrition-focused physical exam.  Determine calorie, protein, vitamin, mineral and fluid requirements.  Assess for micronutrient deficiencies; supplement if depleted.  Assess need and assist with meal set-up and feeding.  Adjust diet or meal schedule based on preferences and tolerance.  Offer oral supplemental food or drinks to enhance calorie and protein intake.  Establish bowel elimination program to increase comfort and appetite.  Minimize unnecessary dietary restrictions to increase oral intake.  Provide and encourage oral hygiene to enhance desire to eat.  Consider enteral nutrition support if oral intake remains inadequate; provide parenteral nutrition if enteral is contraindicated.  Recent Flowsheet Documentation  Taken 7/2/2024 2358 by Shima Zendejas RN  Oral Nutrition Promotion:   rest periods promoted   safe use of adaptive equipment encouraged  Taken 7/2/2024 2012 by Shima Zendejas RN  Oral Nutrition Promotion:   safe use of adaptive equipment encouraged   rest periods promoted  Intervention: Optimize Skin Protection  Description: Perform a full pressure injury risk assessment, as indicated by screening, upon admission to care unit.  Reassess skin (injury risk, full inspection)  frequently (e.g., scheduled interval, with change in condition) to provide optimal early detection and prevention.  Maintain adequate tissue perfusion (e.g., encourage fluid balance; avoid crossing legs, constrictive clothing or devices) to promote tissue oxygenation.  Maintain head of bed at lowest degree of elevation tolerated, considering medical condition and other restrictions.  Avoid positioning onto an area that remains reddened.  Minimize incontinence and moisture (e.g., toileting schedule; moisture-wicking pad, diaper or incontinence collection device; skin moisture barrier).  Cleanse skin promptly and gently when soiled utilizing a pH-balanced cleanser.  Relieve and redistribute pressure (e.g., scheduled position changes, weight shifts, use of support surface, medical device repositioning, protective dressing application, use of positioning device, microclimate control, use of pressure-injury-monitor  Encourage increased activity, such as sitting in a chair at the bedside or early mobilization, when able to tolerate.  Recent Flowsheet Documentation  Taken 7/3/2024 0402 by Shima Zendejas RN  Head of Bed (HOB) Positioning: HOB lowered  Taken 7/3/2024 0204 by Shima Zendejas RN  Head of Bed (HOB) Positioning: HOB at 15 degrees  Taken 7/2/2024 2358 by Shima Zendejas RN  Pressure Reduction Techniques:   frequent weight shift encouraged   heels elevated off bed   weight shift assistance provided  Head of Bed (HOB) Positioning: HOB at 15 degrees  Pressure Reduction Devices:   alternating pressure pump (ADD)   positioning supports utilized   pressure-redistributing mattress utilized  Skin Protection:   adhesive use limited   transparent dressing maintained   tubing/devices free from skin contact   incontinence pads utilized  Taken 7/2/2024 2155 by Shima Zendejas RN  Head of Bed (HOB) Positioning: HOB at 20-30 degrees  Taken 7/2/2024 2012 by Shima Zendejas RN  Pressure Reduction Techniques:   frequent  weight shift encouraged   heels elevated off bed   weight shift assistance provided  Head of Bed (HOB) Positioning: HOB at 30-45 degrees  Pressure Reduction Devices:   alternating pressure pump (ADD)   positioning supports utilized   pressure-redistributing mattress utilized  Skin Protection:   adhesive use limited   incontinence pads utilized   transparent dressing maintained   tubing/devices free from skin contact     Problem: Pain Acute  Goal: Acceptable Pain Control and Functional Ability  Outcome: Ongoing, Progressing  Intervention: Prevent or Manage Pain  Description: Evaluate pain level, effect of treatment and patient response at regular intervals.  Minimize painful stimuli; coordinate care and adjust environment (e.g., light, noise, unnecessary movement); promote sleep/rest.  Match pharmacologic analgesia to severity and type of pain mechanism (e.g., neuropathic, muscle, inflammatory); consider multimodal approach (e.g., nonopioid, opioid, adjuvant).  Provide medication at regular intervals; titrate to patient response; premedicate for painful procedures.  Manage breakthrough pain with additional doses; consider rotation or switching medication.  Monitor for signs of substance tolerance (increased dose to reach desired effect, decreased effect with same dose).  Manage medication-induced effects, such as constipation, nausea, pruritus, urinary retention, somnolence and dizziness.  Provide multimodal interventions, such as as physical activity, therapeutic exercise, yoga, TENS (transcutaneous electrical nerve stimulation) and manual therapy.  Train in functional activity modifications, such as body mechanics, posture, ergonomics, energy conservation and activity pacing.  Consider addition of complementary or alternative therapy, such as acupuncture, hypnosis or therapeutic touch.  Recent Flowsheet Documentation  Taken 7/3/2024 0402 by Shima Zendejas RN  Medication Review/Management: medications  reviewed  Taken 7/3/2024 0204 by Shima Zendejas RN  Medication Review/Management:   medications reviewed   high-risk medications identified  Taken 7/2/2024 2358 by Shima Zendejas RN  Sensory Stimulation Regulation:   care clustered   lighting decreased   television on  Bowel Elimination Promotion: adequate fluid intake promoted  Sleep/Rest Enhancement:   awakenings minimized   consistent schedule promoted   room darkened  Medication Review/Management:   medications reviewed   high-risk medications identified  Taken 7/2/2024 2155 by Shima Zendejas RN  Medication Review/Management: medications reviewed  Taken 7/2/2024 2012 by Shima Zendejas RN  Sensory Stimulation Regulation:   care clustered   television on  Bowel Elimination Promotion: (PRN ducolax administered)   adequate fluid intake promoted   other (see comments)  Sleep/Rest Enhancement:   awakenings minimized   consistent schedule promoted  Medication Review/Management:   medications reviewed   high-risk medications identified  Intervention: Optimize Psychosocial Wellbeing  Description: Facilitate patient’s self-control over pain by providing pain information and allowing choices in treatment.  Consider and address emotional response to pain.  Explore and promote use of coping strategies; address barriers to successful coping.  Evaluate and assist with psychosocial, cultural and spiritual factors impacting pain.  Modify pain perception using techniques, such as distraction, mindfulness, guided imagery, meditation or music.  Assess for risk factors for developing chronic pain, such as depression, fear, pain avoidance and pain catastrophizing.  Consider referral for ongoing coping support, such as education, relaxation training and role of thoughts.  Recent Flowsheet Documentation  Taken 7/2/2024 2358 by Shima Zendejas RN  Supportive Measures:   active listening utilized   verbalization of feelings encouraged  Diversional Activities:  television  Taken 7/2/2024 2012 by Shima Zendejas, RN  Supportive Measures:   active listening utilized   verbalization of feelings encouraged  Diversional Activities: television

## 2024-07-03 NOTE — DISCHARGE PLACEMENT REQUEST
"Marianna Terrell (89 y.o. Female)       Date of Birth   05/08/1931    Social Security Number       Address   [facility] [place] [id number]    Home Phone       MRN   [id number]       Religious   Adventism    Marital Status   Other                            Admission Date   6/28/24    Admission Type   Emergency    Admitting Provider   Eros Dalton MD    Attending Provider   Makenzie Jacobs MD    Department, Room/Bed   [address], E555/1       Discharge Date       Discharge Disposition   Home or Self Care    Discharge Destination                                 Attending Provider: Makenzie Jacobs MD    Allergies: No Known Allergies    Isolation: None   Infection: None   Code Status: CPR    Ht: 162.6 cm (64\")   Wt: 73.5 kg (162 lb)    Admission Cmt: None   Principal Problem: UTI (urinary tract infection) [N39.0]                   Active Insurance as of 6/28/2024       Primary Coverage       Payor Plan Insurance Group Employer/Plan Group    MEDICARE MEDICARE A ONLY        Payor Plan Address Payor Plan Phone Number Payor Plan Fax Number Effective Dates    [address] [phone]  5/1/1996 - None Entered    [facility] [id number]         Subscriber Name Subscriber Birth Date Member ID       MARIANNA TERRELL 5/8/1931 [id number]               Secondary Coverage       Payor Plan Insurance Group Employer/Plan Group    [place] [id number]       Payor Plan Address Payor Plan Phone Number Payor Plan Fax Number Effective Dates    [address]   1/1/2006 - None Entered    [facility] [id number]         Subscriber Name Subscriber Birth Date Member ID       MARIANNA TERRELL 5/8/1931 [id number]                     Emergency Contacts        (Rel.) Home Phone Work Phone Mobile Phone    Pineda Terrell (Son) [phone] -- --                 Discharge Summary        aMkenzie Jacobs MD at 07/03/24 0837              Patient Name: Marianna COLVIN " Xander  : 1931  MRN: [id number]    Date of Admission: 2024  Date of Discharge:  7/3/2024  Primary Care Physician: Maya Brizuela APRN      Chief Complaint:   Fall      Discharge Diagnoses     Active Hospital Problems    Diagnosis  POA    **UTI (urinary tract infection) [N39.0]  Yes    Dementia [F03.90]  Unknown    Hypothyroidism (acquired) [E03.9]  Unknown    CKD (chronic kidney disease) [N18.9]  Unknown    Fall [W19.XXXA]  Unknown    Hyperlipidemia [E78.5]  Unknown      Resolved Hospital Problems   No resolved problems to display.        Hospital Course     Ms. Terrell is a 89 y.o. female with a history of CKD, hypertension, hypothyroidism who presented to [facility] initially complaining of mechanical fall.  Please see the admitting history and physical for further details.  She was found to have UTI and was admitted to the hospital for further evaluation and treatment.  Urine culture collected and the patient was initiated on broad-spectrum antibiotics with Rocephin.  Ultimately the patient had E. coli growing in her urine, she completed a course of ceftriaxone while admitted and is doing well.  The patient does have a history of frequent falls, she was noted to have a systolic murmur on exam so an echo was performed without significant valvular disease.  She reports that she has had a cardiac murmur for many years.  Initially the plan was to return home to assisted living however the patient worked with physical therapy while admitted and it was determined she would benefit from discharging to rehab prior to returning to her living facility.  She should follow-up with her PCP in a week for posthospital follow-up visit.    Day of Discharge     Subjective:  Sitting up in bed, eating breakfast.  She remains pleasantly forgetful.    Physical Exam:  Temp:  [97.3 °F (36.3 °C)-97.8 °F (36.6 °C)] 97.8 °F (36.6 °C)  Heart Rate:  [54-62] 60  Resp:  [16-21] 21  BP: (104-174)/(60-95) 174/95  Body mass  index is 27.81 kg/m².  Physical Exam  Constitutional:       General: She is not in acute distress.     Appearance: She is not ill-appearing.      Comments: Elderly, frail   Cardiovascular:      Rate and Rhythm: Normal rate and regular rhythm.      Heart sounds: Murmur heard.   Pulmonary:      Effort: Pulmonary effort is normal. No respiratory distress.   Abdominal:      General: Abdomen is flat.      Palpations: Abdomen is soft.      Tenderness: There is no abdominal tenderness.   Musculoskeletal:         General: No swelling or tenderness.      Right lower leg: No edema.      Left lower leg: No edema.   Skin:     General: Skin is warm and dry.   Neurological:      General: No focal deficit present.      Mental Status: She is alert and oriented to person, place, and time. Mental status is at baseline.      Comments: Pleasantly forgetful   Consultants     Consult Orders (all) (From admission, onward)       Start     Ordered    06/30/24 0737  Inpatient Case Management  Consult  Once        Provider:  (Not yet assigned)    06/30/24 0736    06/29/24 1624  Inpatient Case Management  Consult  Once        Provider:  (Not yet assigned)    06/29/24 1624    06/29/24 0507  Inpatient Case Management  Consult  Once        Provider:  (Not yet assigned)    06/29/24 0506    06/29/24 0136  [facility] (on-call MD unless specified) Details  Once        Specialty:  Hospitalist  Provider:  (Not yet assigned)    06/29/24 0135                  Procedures     Imaging Results (All)       Procedure Component Value Units Date/Time    CT Soft Tissue Neck With Contrast [[id number]] Collected: 06/29/24 0443     Updated: 06/29/24 0443    Narrative:        Patient: CHARLENE GARCIA  Time Out: 04:42  Exam(s): CT NECK With Contrast     EXAM:    CT Neck With Intravenous Contrast    CLINICAL HISTORY:     Reason for exam: upper airway stridor.    TECHNIQUE:    Axial computed tomography images of the neck with intravenous  contrast.    CTDI is 13.48 mGy and DLP is 330.7 mGy-cm.  This CT exam was performed   according to the principle of ALARA (As Low As Reasonably Achievable) by   using one or more of the following dose reduction techniques: automated   exposure control, adjustment of the mA and or kV according to patient   size, and or use of iterative reconstruction technique.    COMPARISON:    No relevant prior studies available.    FINDINGS:    Oropharynx:  Unremarkable.  No significant tonsillar enlargement.  No   peritonsillar abscess.    Hypopharynx:  Unremarkable.    Larynx:  Unremarkable.  Normal epiglottis.    Trachea:  Unremarkable.    Retropharyngeal space:  Unremarkable.    Submandibular parotid glands:  Unremarkable.  Glands are normal in size.      Thyroid:  Unremarkable.  No enlarged or calcified nodules.    Bones joints:  Degenerative changes in the spine.  No acute fracture.    Soft tissues:  Unremarkable.    Vasculature:  Atherosclerotic disease.    Lymph nodes:  Unremarkable.  No lymphadenopathy.    Dental:  Motion artifact and dental amalgam mildly limit evaluation.    Lung apices:  Unremarkable as visualized.    Other findings:  Patent airway.    IMPRESSION:       1.  Motion artifact and dental amalgam mildly limit evaluation.  2.  No acute findings on CT neck.      Impression:          Electronically signed by Malvin Zayas MD on 06-29-24 at 0442    XR Chest 1 View [[id number]] Collected: 06/29/24 0103     Updated: 06/29/24 0103    Narrative:        Patient: CHARLENE GARCIA  Time Out: 01:03  Exam(s): XR CXR 1 VIEW     EXAM:    XR Chest, 1 View    CLINICAL HISTORY:     Reason for exam: Fall, shortness of breath.    TECHNIQUE:    Frontal view of the chest.    COMPARISON:    No previous studies.    FINDINGS:    Lungs:  Unremarkable.  No consolidation.    Pleural space:  Unremarkable.  No pneumothorax.    Heart:  Heart is normal in size.  No cardiomegaly.    Mediastinum:  Unremarkable.  Normal mediastinal contour.     Bones joints:  Osteopenia.  No acute fracture.    Vasculature:  Atherosclerotic disease.    Other findings:  Hypoaeration.    IMPRESSION:       1.  Marked hypoaeration.  2.  Atherosclerotic disease.  3.  No consolidative changes or pleural effusions.  4.  Osteopenia.      Impression:          Electronically signed by Eliseo Ventura MD on 06-29-24 at 0103    CT Head Without Contrast [[id number]] Collected: 06/28/24 2250     Updated: 06/28/24 2305    Narrative:      CT HEAD AND CERVICAL SPINE WITHOUT CONTRAST     CLINICAL HISTORY: Fall. Hit the back of her head. Neck is sore all over     TECHNIQUE: CT scan of the head was obtained with 3 mm axial soft tissue  and 2 mm bone algorithm images. No intravenous contrast was  administered. Sagittal and coronal reconstructions were obtained.     COMPARISON: None.     FINDINGS:       There is no evidence for an acute extra-axial hemorrhage or a calvarial  fracture.     Moderate changes of chronic small vessel ischemic phenomena are noted.  The ventricles, sulci, and cisterns are age-appropriate. The gray-white  matter differentiation is within normal limits. The basal ganglia and  thalami are unremarkable in appearance. The posterior fossa structures  are within normal limits.     Incidental note is made of a small to moderate size right parietal scalp  hematoma.       Impression:         No evidence for acute traumatic intracranial pathology.        TECHNIQUE: CT scan of the cervical spine was obtained with 1 mm axial  bone algorithm and 2 mm axial soft tissue algorithm images. Sagittal and  coronal reconstructed images were obtained.     FINDINGS:     There is no evidence for acute fracture or bony malalignment involving  the cervical spine.     Incidental degenerative phenomena are appreciated within the cervical  spine with disc osteophyte complexes at C5-6 and C6 resulting in up to  moderate degrees of stenoses. Multilevel foraminal stenotic changes are  also noted from  C3-4 down to C6-7.     IMPRESSION:     No evidence for acute fracture or bony malalignment involving the  cervical spine.     Incidental degenerative phenomena as discussed above.           Radiation dose reduction techniques were utilized, including automated  exposure control and exposure modulation based on body size.     This report was finalized on 6/28/2024 11:02 PM by Dr. Anil Hernandez M.D  on Workstation: [id number]       CT Cervical Spine Without Contrast [[id number]] Collected: 06/28/24 2250     Updated: 06/28/24 2305    Narrative:      CT HEAD AND CERVICAL SPINE WITHOUT CONTRAST     CLINICAL HISTORY: Fall. Hit the back of her head. Neck is sore all over     TECHNIQUE: CT scan of the head was obtained with 3 mm axial soft tissue  and 2 mm bone algorithm images. No intravenous contrast was  administered. Sagittal and coronal reconstructions were obtained.     COMPARISON: None.     FINDINGS:       There is no evidence for an acute extra-axial hemorrhage or a calvarial  fracture.     Moderate changes of chronic small vessel ischemic phenomena are noted.  The ventricles, sulci, and cisterns are age-appropriate. The gray-white  matter differentiation is within normal limits. The basal ganglia and  thalami are unremarkable in appearance. The posterior fossa structures  are within normal limits.     Incidental note is made of a small to moderate size right parietal scalp  hematoma.       Impression:         No evidence for acute traumatic intracranial pathology.        TECHNIQUE: CT scan of the cervical spine was obtained with 1 mm axial  bone algorithm and 2 mm axial soft tissue algorithm images. Sagittal and  coronal reconstructed images were obtained.     FINDINGS:     There is no evidence for acute fracture or bony malalignment involving  the cervical spine.     Incidental degenerative phenomena are appreciated within the cervical  spine with disc osteophyte complexes at C5-6 and C6 resulting in up  "to  moderate degrees of stenoses. Multilevel foraminal stenotic changes are  also noted from C3-4 down to C6-7.     IMPRESSION:     No evidence for acute fracture or bony malalignment involving the  cervical spine.     Incidental degenerative phenomena as discussed above.           Radiation dose reduction techniques were utilized, including automated  exposure control and exposure modulation based on body size.     This report was finalized on 6/28/2024 11:02 PM by Dr. Anil Hernandez M.D  on Workstation: [id number]               Pertinent Labs     Results from last 7 days   Lab Units 07/02/24 0357 07/01/24  0559 06/30/24  0515 06/29/24  0536   WBC 10*3/mm3 9.27 10.20 9.74 10.56   HEMOGLOBIN g/dL 12.7 12.9 12.5 12.8   PLATELETS 10*3/mm3 199 215 194 210     Results from last 7 days   Lab Units 07/02/24 0357 07/01/24  0559 06/30/24  0514 06/29/24  0536   SODIUM mmol/L 134* 135* 138 136   POTASSIUM mmol/L 4.1 4.3 4.5 4.5   CHLORIDE mmol/L 101 100 104 101   CO2 mmol/L 25.0 23.8 26.0 23.3   BUN mg/dL 15 19 29* 22   CREATININE mg/dL 0.67 0.73 0.85 0.71   GLUCOSE mg/dL 107* 106* 102* 109*   Estimated Creatinine Clearance: 51.5 mL/min (by C-G formula based on SCr of 0.67 mg/dL).  Results from last 7 days   Lab Units 06/28/24  2319   ALBUMIN g/dL 4.2   BILIRUBIN mg/dL 0.4   ALK PHOS U/L 42   AST (SGOT) U/L 11   ALT (SGPT) U/L 14     Results from last 7 days   Lab Units 07/02/24 0357 07/01/24  0559 06/30/24  0514 06/29/24  0536 06/28/24  2319   CALCIUM mg/dL 9.3 9.0 9.1 9.1 9.8*   ALBUMIN g/dL  --   --   --   --  4.2       Results from last 7 days   Lab Units 06/29/24  0536 06/29/24  0123 06/28/24  2319   HSTROP T ng/L 22* 23* 23*   PROBNP pg/mL  --   --  193.0           Invalid input(s): \"LDLCALC\"  Results from last 7 days   Lab Units 06/29/24  0536 06/28/24  2346   BLOODCX  No growth at 2 days  --    URINECX   --  >100,000 CFU/mL Escherichia coli*       Test Results Pending at Discharge     Pending Labs       Order " Current Status    Blood Culture - Blood, Arm, Right Preliminary result            Discharge Details        Discharge Medications        Changes to Medications        Instructions Start Date   metoprolol tartrate 25 MG tablet  Commonly known as: LOPRESSOR  What changed:   how much to take  how to take this  when to take this   25 mg, Oral, Every 12 Hours Scheduled             Continue These Medications        Instructions Start Date   aspirin 81 MG tablet   81 mg, Oral      calcium citrate-vitamin d 200-250 MG-UNIT tablet tablet  Commonly known as: CITRACAL   1 tablet, Oral, Daily      escitalopram 5 MG tablet  Commonly known as: LEXAPRO   20 mg, Oral      fish oil 1200 MG capsule capsule   1 capsule, Oral, Daily      levothyroxine 75 MCG tablet  Commonly known as: SYNTHROID, LEVOTHROID   75 mcg, Oral, Daily      multivitamin tablet  Generic drug: multivitamin   1 tablet, Oral, Daily      oxybutynin 5 MG tablet  Commonly known as: DITROPAN   5 mg, Oral, 2 Times Daily      sennosides-docusate 8.6-50 MG per tablet  Commonly known as: PERICOLACE   2 tablets, Oral      simvastatin 20 MG tablet  Commonly known as: ZOCOR   20 mg, Oral, Daily      vitamin E 400 UNIT capsule   400 Units, Oral             Stop These Medications      alendronate 70 MG tablet  Commonly known as: FOSAMAX              No Known Allergies      Discharge Disposition:  Home or Self Care    Discharge Diet:  Diet Order   Procedures    Diet: Regular/House; Fluid Consistency: Thin (IDDSI 0)       Discharge Activity:   Activity Instructions       Activity as Tolerated              CODE STATUS:    Code Status and Medical Interventions:   Ordered at: 06/29/24 0255     Code Status (Patient has no pulse and is not breathing):    CPR (Attempt to Resuscitate)     Medical Interventions (Patient has pulse or is breathing):    Full Support       No future appointments.  Additional Instructions for the Follow-ups that You Need to Schedule       Discharge Follow-up  with PCP   As directed       Currently Documented PCP:    Maya Brizuela APRN    PCP Phone Number:    [phone]     Follow Up Details: post-hospital follow up               Contact information for follow-up providers       Maya Brizuela APRN .    Specialty: Nurse Practitioner  Why: post-hospital follow up  Contact information:  [address]  [address]  [facility] [address]  [phone]                       Contact information for after-discharge care       Destination       [facility] .    Service: Skilled Nursing  Contact information:  [address] [facility] [address] [phone]                                   Additional Instructions for the Follow-ups that You Need to Schedule       Discharge Follow-up with PCP   As directed       Currently Documented PCP:    Maya Brizuela APRN    PCP Phone Number:    [phone]     Follow Up Details: post-hospital follow up            Time Spent on Discharge:  I spent greater than 30 minutes on this discharge activity which included: face-to-face encounter with the patient, reviewing the data in the system, coordination of the care with the nursing staff as well as consultants, documentation, and entering orders.       Makenzie Jacobs MD  [facility]  07/03/24  08:38 EDT                Electronically signed by Makenzie Jacobs MD at 07/03/24 0907

## 2024-07-03 NOTE — PLAN OF CARE
Problem: Adult Inpatient Plan of Care  Goal: Plan of Care Review  Outcome: Ongoing, Progressing     Problem: Adult Inpatient Plan of Care  Goal: Absence of Hospital-Acquired Illness or Injury  Intervention: Identify and Manage Fall Risk  Recent Flowsheet Documentation  Taken 7/3/2024 0815 by Anitra Alamo RN  Safety Promotion/Fall Prevention:   safety round/check completed   fall prevention program maintained     Problem: Fall Injury Risk  Goal: Absence of Fall and Fall-Related Injury  Intervention: Promote Injury-Free Environment  Recent Flowsheet Documentation  Taken 7/3/2024 0815 by Anitra Alamo RN  Safety Promotion/Fall Prevention:   safety round/check completed   fall prevention program maintained   Goal Outcome Evaluation:

## 2024-07-06 LAB — BACTERIA SPEC AEROBE CULT: NORMAL

## 2024-08-20 ENCOUNTER — APPOINTMENT (OUTPATIENT)
Dept: GENERAL RADIOLOGY | Facility: HOSPITAL | Age: 89
End: 2024-08-20
Payer: COMMERCIAL

## 2024-08-20 ENCOUNTER — HOSPITAL ENCOUNTER (EMERGENCY)
Facility: HOSPITAL | Age: 89
Discharge: NURSING, REHAB OR LONG-TERM CARE | End: 2024-08-21
Attending: EMERGENCY MEDICINE
Payer: COMMERCIAL

## 2024-08-20 ENCOUNTER — APPOINTMENT (OUTPATIENT)
Dept: CT IMAGING | Facility: HOSPITAL | Age: 89
End: 2024-08-20
Payer: COMMERCIAL

## 2024-08-20 DIAGNOSIS — S00.93XA CONTUSION OF HEAD, UNSPECIFIED PART OF HEAD, INITIAL ENCOUNTER: ICD-10-CM

## 2024-08-20 DIAGNOSIS — S86.812S PATELLAR TENDON RUPTURE, LEFT, SEQUELA: ICD-10-CM
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
## 2024-08-20 PROCEDURE — 99284 EMERGENCY DEPT VISIT MOD MDM: CPT

## 2024-08-20 PROCEDURE — 73560 X-RAY EXAM OF KNEE 1 OR 2: CPT

## 2024-08-21 ENCOUNTER — APPOINTMENT (OUTPATIENT)
Dept: CT IMAGING | Facility: HOSPITAL | Age: 89
End: 2024-08-21
Payer: COMMERCIAL

## 2024-08-21 VITALS
SYSTOLIC BLOOD PRESSURE: 109 MMHG | HEIGHT: 64 IN | RESPIRATION RATE: 16 BRPM | TEMPERATURE: 98.3 F | DIASTOLIC BLOOD PRESSURE: 91 MMHG | OXYGEN SATURATION: 94 % | HEART RATE: 64 BPM | BODY MASS INDEX: 26.46 KG/M2 | WEIGHT: 155 LBS

## 2024-08-21 PROCEDURE — 70450 CT HEAD/BRAIN W/O DYE: CPT

## 2024-08-21 NOTE — ED NOTES
Patient presents to ED from [facility]. Staff reports patient had a fall 5 days ago which she was not seen for, she also had an unwitnessed fall today. Staff called EMS today and reported a fall with no injuries. Upon EMS arrival EMS staff found left knee to be possbily dislocated.

## 2024-08-21 NOTE — ED NOTES
Called and spoke to Pineda (son) per patient request and gave him an update. He said he is unable to give her a ride back to the facility. I also attempted to call [facility] to make sure they know she will be heading back soon, but got no response.

## 2024-08-21 NOTE — ED PROVIDER NOTES
EMERGENCY DEPARTMENT ENCOUNTER    Room Number:  24/24  PCP: Provider, No Known  Historian: Patient and EMS      HPI:  Chief Complaint: Fall  A complete HPI/ROS/PMH/PSH/SH/FH are unobtainable due to: Memory impairment  Context: Marianna Terrell is a 89 y.o. female who presents to the ED c/o fall.  Patient is seen for fall multiple times.  Patient apparently had a fall a few days ago and then had a fall again today.  Patient states she did not hit her head.  No neck pain chest pain or abdominal pain.  Patient sent for possible knee dislocation.  Patient does have some mild pain in left knee.            PAST MEDICAL HISTORY  Active Ambulatory Problems     Diagnosis Date Noted    UTI (urinary tract infection) 06/29/2024    Dementia 06/29/2024    Hypothyroidism (acquired) 06/29/2024    CKD (chronic kidney disease) 06/29/2024    Fall 06/29/2024    Hyperlipidemia 06/29/2024     Resolved Ambulatory Problems     Diagnosis Date Noted    No Resolved Ambulatory Problems     Past Medical History:   Diagnosis Date    Anxiety     Chronic kidney disease (CKD), stage IV (severe)     Difficulty walking 01-22    High cholesterol     HL (hearing loss) 01-22    Hypothyroid          PAST SURGICAL HISTORY  Past Surgical History:   Procedure Laterality Date    CYSTECTOMY      HYSTERECTOMY      KNEE SURGERY           FAMILY HISTORY  Family History   Problem Relation Age of Onset    Breast cancer Paternal Aunt     Ovarian cancer Neg Hx     Uterine cancer Neg Hx     Colon cancer Neg Hx     Deep vein thrombosis Neg Hx     Pulmonary embolism Neg Hx          SOCIAL HISTORY  Social History     Socioeconomic History    Marital status: Other   Tobacco Use    Smoking status: Never    Smokeless tobacco: Never   Vaping Use    Vaping status: Never Used   Substance and Sexual Activity    Alcohol use: Never    Drug use: Never    Sexual activity: Not Currently         ALLERGIES  Patient has no known allergies.        REVIEW OF SYSTEMS  Review of Systems    Left knee pain      PHYSICAL EXAM  ED Triage Vitals [08/20/24 2234]   Temp Heart Rate Resp BP SpO2   97.8 °F (36.6 °C) 65 16 126/76 94 %      Temp src Heart Rate Source Patient Position BP Location FiO2 (%)   Tympanic -- -- -- --       Physical Exam      GENERAL: no acute distress  HENT: nares patent  EYES: no scleral icterus  CV: regular rhythm, normal rate  RESPIRATORY: normal effort  ABDOMEN: soft  MUSCULOSKELETAL: Left knee with high riding patella and patellar tendon deficit.  She cannot extend left leg. No pain  NEURO: alert, moves all extremities, follows commands  PSYCH:  calm, cooperative  SKIN: warm, dry    Vital signs and nursing notes reviewed.          LAB RESULTS  No results found for this or any previous visit (from the past 24 hour(s)).          RADIOLOGY  CT Head Without Contrast    Result Date: 8/21/2024  CT HEAD WITHOUT CONTRAST  HISTORY: Fall with head injury  COMPARISON: June 28, 2024  TECHNIQUE: Axial CT imaging was obtained through the brain. No IV contrast was administered.  FINDINGS: No acute intracranial hemorrhage is seen. There is diffuse atrophy. There is periventricular and deep white matter microangiopathic change. There is no midline shift or mass effect. No calvarial fracture is seen. Mucosal thickening is noted within the ethmoid sinuses. Mastoid air cells are clear. There are advanced degenerative changes of the temporomandibular joints.      No acute intracranial findings.  Radiation dose reduction techniques were utilized, including automated exposure control and exposure modulation based on body size.   This report was finalized on 8/21/2024 12:18 AM by Dr. Nadeen Tejeda M.D on Workstation: [id number]      XR Knee 1 or 2 View Left    Result Date: 8/20/2024  2 VIEWS LEFT KNEE  HISTORY: Patellar tendon rupture  COMPARISON: None available.  FINDINGS: The patient is status post left knee arthroplasty. The patella is displaced superiorly. There is an irregular contour to the  patellar tendon shadow. Appearance would be in keeping with history of patellar tendon rupture. No acute fracture is seen. Hardware otherwise projects in expected position.      High riding patella.  This report was finalized on 8/20/2024 11:35 PM by Dr. Nadeen Tejeda M.D on Workstation: [id number]       Ordered the above noted radiological studies.  Left knee independently interpreted by me and shows high riding patella          PROCEDURES  Procedures          MEDICATIONS GIVEN IN ER  Medications - No data to display                MEDICAL DECISION MAKING, PROGRESS, and CONSULTS    All labs have been independently reviewed by me.  All radiology studies have been reviewed by me and I have also reviewed the radiology report.   EKG's independently viewed and interpreted by me.  Discussion below represents my analysis of pertinent findings related to patient's condition, differential diagnosis, treatment plan and final disposition.      Additional sources:  - Discussed/ obtained information from independent historians: None    - External (non-ED) record review: Epic reviewed and patient seen 1/10/2022 for chronic patellar tendon rupture by orthopedics    - Chronic or social conditions impacting care: None    - Shared decision making: None      Orders placed during this visit:  Orders Placed This Encounter   Procedures    CT Head Without Contrast    XR Knee 1 or 2 View Left         Additional orders considered but not ordered:  None        Differential diagnosis includes but is not limited to:    Sprain versus fracture versus patellar tendon rupture      Independent interpretation of labs, radiology studies, and discussions with consultants:  ED Course as of 08/21/24 0135   Wed Aug 21, 2024   0058 00:58 EDT  Patient here for fall and knee deformity.  Patient has no knee pain.  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  This has been present since 2022.  Has seen orthopedic surgeon and they have been  managing it nonoperative.  Patient has no pain or tenderness.  No swelling.  Will be discharged back to facility [SL]      ED Course User Index  [SL] Jason Escamilla MD               DIAGNOSIS  Final diagnoses:   Fall, initial encounter   Contusion of head, unspecified part of head, initial encounter   Patellar tendon rupture, left, sequela         DISPOSITION  DISCHARGE    Patient discharged in stable condition.    Reviewed implications of results, diagnosis, meds, responsibility to follow up, warning signs and symptoms of possible worsening, potential complications and reasons to return to ER, including worsening symptoms    Patient/Family voiced understanding of above instructions.    Discussed plan for discharge, as there is no emergent indication for admission. Patient referred to primary care provider for BP management due to today's BP. Pt/family is agreeable and understands need for follow up and repeat testing.  Pt is aware that discharge does not mean that nothing is wrong but it indicates no emergency is present that requires admission and they must continue care with follow-up as given below or physician of their choice.     FOLLOW-UP  PATIENT CONNECTION - [facility] [id number]  [phone]  Schedule an appointment as soon as possible for a visit            Medication List      No changes were made to your prescriptions during this visit.                  Latest Documented Vital Signs:  As of 01:35 EDT  BP- (!) 137/38 HR- 64 Temp- 97.8 °F (36.6 °C) (Tympanic) O2 sat- 91%              --    Please note that portions of this were completed with a voice recognition program.       Note Disclaimer: At [facility], we believe that sharing information builds trust and better relationships. You are receiving this note because you are receiving care at [facility] or recently visited. It is possible you will see health information before a provider has talked with you about it. This kind  of information can be easy to misunderstand. To help you fully understand what it means for your health, we urge you to discuss this note with your provider.            Jason Escamilla MD  08/21/24 0138

## 2024-08-31 ENCOUNTER — APPOINTMENT (OUTPATIENT)
Dept: GENERAL RADIOLOGY | Facility: HOSPITAL | Age: 89
DRG: 690 | End: 2024-08-31
Payer: COMMERCIAL

## 2024-08-31 ENCOUNTER — APPOINTMENT (OUTPATIENT)
Dept: CT IMAGING | Facility: HOSPITAL | Age: 89
DRG: 690 | End: 2024-08-31
Payer: COMMERCIAL

## 2024-08-31 ENCOUNTER — APPOINTMENT (OUTPATIENT)
Dept: GENERAL RADIOLOGY | Facility: HOSPITAL | Age: 89
DRG: 690 | End: 2024-08-31
Payer: MEDICARE

## 2024-08-31 ENCOUNTER — HOSPITAL ENCOUNTER (INPATIENT)
Facility: HOSPITAL | Age: 89
LOS: 3 days | Discharge: NURSING, REHAB OR LONG-TERM CARE | DRG: 690 | End: 2024-09-05
Attending: EMERGENCY MEDICINE | Admitting: STUDENT IN AN ORGANIZED HEALTH CARE EDUCATION/TRAINING PROGRAM
Payer: COMMERCIAL

## 2024-08-31 DIAGNOSIS — I10 HYPERTENSION NOT AT GOAL: ICD-10-CM

## 2024-08-31 DIAGNOSIS — R51.9 NONINTRACTABLE HEADACHE, UNSPECIFIED CHRONICITY PATTERN, UNSPECIFIED HEADACHE TYPE: ICD-10-CM

## 2024-08-31 DIAGNOSIS — R53.1 WEAKNESS: ICD-10-CM

## 2024-08-31 DIAGNOSIS — N39.0 URINARY TRACT INFECTION WITHOUT HEMATURIA, SITE UNSPECIFIED: Primary | ICD-10-CM

## 2024-08-31 LAB
ALBUMIN SERPL-MCNC: 4.1 G/DL (ref 3.5–5.2)
ALBUMIN/GLOB SERPL: 1.6 G/DL
ALP SERPL-CCNC: 59 U/L (ref 39–117)
ALT SERPL W P-5'-P-CCNC: 11 U/L (ref 1–33)
ANION GAP SERPL CALCULATED.3IONS-SCNC: 10 MMOL/L (ref 5–15)
AST SERPL-CCNC: 9 U/L (ref 1–32)
B PARAPERT DNA SPEC QL NAA+PROBE: NOT DETECTED
B PERT DNA SPEC QL NAA+PROBE: NOT DETECTED
BACTERIA UR QL AUTO: ABNORMAL /HPF
BASOPHILS # BLD AUTO: 0.04 10*3/MM3 (ref 0–0.2)
BASOPHILS NFR BLD AUTO: 0.4 % (ref 0–1.5)
BILIRUB SERPL-MCNC: 1 MG/DL (ref 0–1.2)
BILIRUB UR QL STRIP: NEGATIVE
BUN SERPL-MCNC: 14 MG/DL (ref 8–23)
BUN/CREAT SERPL: 23.7 (ref 7–25)
C PNEUM DNA NPH QL NAA+NON-PROBE: NOT DETECTED
CALCIUM SPEC-SCNC: 9.1 MG/DL (ref 8.2–9.6)
CHLORIDE SERPL-SCNC: 102 MMOL/L (ref 98–107)
CLARITY UR: ABNORMAL
CO2 SERPL-SCNC: 25 MMOL/L (ref 22–29)
COLOR UR: YELLOW
CREAT SERPL-MCNC: 0.59 MG/DL (ref 0.57–1)
D-LACTATE SERPL-SCNC: 1.1 MMOL/L (ref 0.5–2)
DEPRECATED RDW RBC AUTO: 42.5 FL (ref 37–54)
EGFRCR SERPLBLD CKD-EPI 2021: 84.2 ML/MIN/1.73
EOSINOPHIL # BLD AUTO: 0.13 10*3/MM3 (ref 0–0.4)
EOSINOPHIL NFR BLD AUTO: 1.2 % (ref 0.3–6.2)
ERYTHROCYTE [DISTWIDTH] IN BLOOD BY AUTOMATED COUNT: 11.9 % (ref 12.3–15.4)
ERYTHROCYTE [SEDIMENTATION RATE] IN BLOOD: 34 MM/HR (ref 0–30)
FLUAV SUBTYP SPEC NAA+PROBE: NOT DETECTED
FLUBV RNA ISLT QL NAA+PROBE: NOT DETECTED
GLOBULIN UR ELPH-MCNC: 2.6 GM/DL
GLUCOSE SERPL-MCNC: 131 MG/DL (ref 65–99)
GLUCOSE UR STRIP-MCNC: NEGATIVE MG/DL
HADV DNA SPEC NAA+PROBE: NOT DETECTED
HCOV 229E RNA SPEC QL NAA+PROBE: NOT DETECTED
HCOV HKU1 RNA SPEC QL NAA+PROBE: NOT DETECTED
HCOV NL63 RNA SPEC QL NAA+PROBE: NOT DETECTED
HCOV OC43 RNA SPEC QL NAA+PROBE: NOT DETECTED
HCT VFR BLD AUTO: 39.7 % (ref 34–46.6)
HGB BLD-MCNC: 13.1 G/DL (ref 12–15.9)
HGB UR QL STRIP.AUTO: ABNORMAL
HMPV RNA NPH QL NAA+NON-PROBE: NOT DETECTED
HPIV1 RNA ISLT QL NAA+PROBE: NOT DETECTED
HPIV2 RNA SPEC QL NAA+PROBE: NOT DETECTED
HPIV3 RNA NPH QL NAA+PROBE: NOT DETECTED
HPIV4 P GENE NPH QL NAA+PROBE: NOT DETECTED
HYALINE CASTS UR QL AUTO: ABNORMAL /LPF
IMM GRANULOCYTES # BLD AUTO: 0.07 10*3/MM3 (ref 0–0.05)
IMM GRANULOCYTES NFR BLD AUTO: 0.6 % (ref 0–0.5)
KETONES UR QL STRIP: NEGATIVE
LEUKOCYTE ESTERASE UR QL STRIP.AUTO: ABNORMAL
LYMPHOCYTES # BLD AUTO: 1.79 10*3/MM3 (ref 0.7–3.1)
LYMPHOCYTES NFR BLD AUTO: 16.3 % (ref 19.6–45.3)
M PNEUMO IGG SER IA-ACNC: NOT DETECTED
MCH RBC QN AUTO: 32 PG (ref 26.6–33)
MCHC RBC AUTO-ENTMCNC: 33 G/DL (ref 31.5–35.7)
MCV RBC AUTO: 97.1 FL (ref 79–97)
MONOCYTES # BLD AUTO: 0.81 10*3/MM3 (ref 0.1–0.9)
MONOCYTES NFR BLD AUTO: 7.4 % (ref 5–12)
NEUTROPHILS NFR BLD AUTO: 74.1 % (ref 42.7–76)
NEUTROPHILS NFR BLD AUTO: 8.16 10*3/MM3 (ref 1.7–7)
NITRITE UR QL STRIP: POSITIVE
NRBC BLD AUTO-RTO: 0 /100 WBC (ref 0–0.2)
NT-PROBNP SERPL-MCNC: 386 PG/ML (ref 0–1800)
PH UR STRIP.AUTO: 7.5 [PH] (ref 5–8)
PLATELET # BLD AUTO: 217 10*3/MM3 (ref 140–450)
PMV BLD AUTO: 9.9 FL (ref 6–12)
POTASSIUM SERPL-SCNC: 4 MMOL/L (ref 3.5–5.2)
PROCALCITONIN SERPL-MCNC: 0.05 NG/ML (ref 0–0.25)
PROT SERPL-MCNC: 6.7 G/DL (ref 6–8.5)
PROT UR QL STRIP: ABNORMAL
RBC # BLD AUTO: 4.09 10*6/MM3 (ref 3.77–5.28)
RBC # UR STRIP: ABNORMAL /HPF
REF LAB TEST METHOD: ABNORMAL
RHINOVIRUS RNA SPEC NAA+PROBE: NOT DETECTED
RSV RNA NPH QL NAA+NON-PROBE: NOT DETECTED
SARS-COV-2 RNA NPH QL NAA+NON-PROBE: NOT DETECTED
SODIUM SERPL-SCNC: 137 MMOL/L (ref 136–145)
SP GR UR STRIP: 1.02 (ref 1–1.03)
SQUAMOUS #/AREA URNS HPF: ABNORMAL /HPF
TROPONIN T SERPL HS-MCNC: 22 NG/L
TSH SERPL DL<=0.05 MIU/L-ACNC: 2.28 UIU/ML (ref 0.27–4.2)
UROBILINOGEN UR QL STRIP: ABNORMAL
WBC # UR STRIP: ABNORMAL /HPF
WBC NRBC COR # BLD AUTO: 11 10*3/MM3 (ref 3.4–10.8)

## 2024-08-31 PROCEDURE — G0378 HOSPITAL OBSERVATION PER HR: HCPCS

## 2024-08-31 PROCEDURE — 80053 COMPREHEN METABOLIC PANEL: CPT | Performed by: EMERGENCY MEDICINE

## 2024-08-31 PROCEDURE — 71045 X-RAY EXAM CHEST 1 VIEW: CPT

## 2024-08-31 PROCEDURE — 85025 COMPLETE CBC W/AUTO DIFF WBC: CPT | Performed by: EMERGENCY MEDICINE

## 2024-08-31 PROCEDURE — 25010000002 CEFTRIAXONE PER 250 MG: Performed by: EMERGENCY MEDICINE

## 2024-08-31 PROCEDURE — 84443 ASSAY THYROID STIM HORMONE: CPT | Performed by: NURSE PRACTITIONER

## 2024-08-31 PROCEDURE — 70450 CT HEAD/BRAIN W/O DYE: CPT

## 2024-08-31 PROCEDURE — 87086 URINE CULTURE/COLONY COUNT: CPT | Performed by: EMERGENCY MEDICINE

## 2024-08-31 PROCEDURE — 87077 CULTURE AEROBIC IDENTIFY: CPT | Performed by: EMERGENCY MEDICINE

## 2024-08-31 PROCEDURE — 84484 ASSAY OF TROPONIN QUANT: CPT | Performed by: EMERGENCY MEDICINE

## 2024-08-31 PROCEDURE — 97162 PT EVAL MOD COMPLEX 30 MIN: CPT

## 2024-08-31 PROCEDURE — 87186 SC STD MICRODIL/AGAR DIL: CPT | Performed by: EMERGENCY MEDICINE

## 2024-08-31 PROCEDURE — P9612 CATHETERIZE FOR URINE SPEC: HCPCS

## 2024-08-31 PROCEDURE — 83605 ASSAY OF LACTIC ACID: CPT | Performed by: EMERGENCY MEDICINE

## 2024-08-31 PROCEDURE — 83880 ASSAY OF NATRIURETIC PEPTIDE: CPT | Performed by: EMERGENCY MEDICINE

## 2024-08-31 PROCEDURE — 99285 EMERGENCY DEPT VISIT HI MDM: CPT

## 2024-08-31 PROCEDURE — 0202U NFCT DS 22 TRGT SARS-COV-2: CPT | Performed by: EMERGENCY MEDICINE

## 2024-08-31 PROCEDURE — 85652 RBC SED RATE AUTOMATED: CPT | Performed by: NURSE PRACTITIONER

## 2024-08-31 PROCEDURE — 81001 URINALYSIS AUTO W/SCOPE: CPT | Performed by: EMERGENCY MEDICINE

## 2024-08-31 PROCEDURE — 84145 PROCALCITONIN (PCT): CPT | Performed by: EMERGENCY MEDICINE

## 2024-08-31 RX ORDER — METOPROLOL TARTRATE 25 MG/1
25 TABLET, FILM COATED ORAL ONCE
Status: COMPLETED | OUTPATIENT
Start: 2024-08-31 | End: 2024-08-31

## 2024-08-31 RX ORDER — ACETAMINOPHEN 160 MG/5ML
650 SOLUTION ORAL EVERY 4 HOURS PRN
Status: DISCONTINUED | OUTPATIENT
Start: 2024-08-31 | End: 2024-09-05 | Stop reason: HOSPADM

## 2024-08-31 RX ORDER — ACETAMINOPHEN 650 MG/1
650 SUPPOSITORY RECTAL EVERY 4 HOURS PRN
Status: DISCONTINUED | OUTPATIENT
Start: 2024-08-31 | End: 2024-09-05 | Stop reason: HOSPADM

## 2024-08-31 RX ORDER — SODIUM CHLORIDE 0.9 % (FLUSH) 0.9 %
10 SYRINGE (ML) INJECTION AS NEEDED
Status: DISCONTINUED | OUTPATIENT
Start: 2024-08-31 | End: 2024-09-05 | Stop reason: HOSPADM

## 2024-08-31 RX ORDER — SODIUM CHLORIDE 9 MG/ML
40 INJECTION, SOLUTION INTRAVENOUS AS NEEDED
Status: DISCONTINUED | OUTPATIENT
Start: 2024-08-31 | End: 2024-09-05 | Stop reason: HOSPADM

## 2024-08-31 RX ORDER — ACETAMINOPHEN 325 MG/1
650 TABLET ORAL EVERY 4 HOURS PRN
Status: DISCONTINUED | OUTPATIENT
Start: 2024-08-31 | End: 2024-09-05 | Stop reason: HOSPADM

## 2024-08-31 RX ORDER — SODIUM CHLORIDE 0.9 % (FLUSH) 0.9 %
10 SYRINGE (ML) INJECTION EVERY 12 HOURS SCHEDULED
Status: DISCONTINUED | OUTPATIENT
Start: 2024-08-31 | End: 2024-09-05 | Stop reason: HOSPADM

## 2024-08-31 RX ORDER — ATORVASTATIN CALCIUM 20 MG/1
10 TABLET, FILM COATED ORAL DAILY
Status: DISCONTINUED | OUTPATIENT
Start: 2024-08-31 | End: 2024-09-05 | Stop reason: HOSPADM

## 2024-08-31 RX ORDER — ALBUTEROL SULFATE 0.83 MG/ML
2.5 SOLUTION RESPIRATORY (INHALATION) EVERY 6 HOURS PRN
Status: DISCONTINUED | OUTPATIENT
Start: 2024-08-31 | End: 2024-09-05 | Stop reason: HOSPADM

## 2024-08-31 RX ORDER — ACETAMINOPHEN 500 MG
500 TABLET ORAL ONCE
Status: COMPLETED | OUTPATIENT
Start: 2024-08-31 | End: 2024-08-31

## 2024-08-31 RX ORDER — ALBUTEROL SULFATE 90 UG/1
2 AEROSOL, METERED RESPIRATORY (INHALATION) EVERY 4 HOURS PRN
COMMUNITY

## 2024-08-31 RX ORDER — LEVOTHYROXINE SODIUM 75 UG/1
75 TABLET ORAL DAILY
Status: DISCONTINUED | OUTPATIENT
Start: 2024-08-31 | End: 2024-09-05 | Stop reason: HOSPADM

## 2024-08-31 RX ORDER — POLYETHYLENE GLYCOL 3350 17 G/17G
17 POWDER, FOR SOLUTION ORAL DAILY PRN
Status: DISCONTINUED | OUTPATIENT
Start: 2024-08-31 | End: 2024-09-05 | Stop reason: HOSPADM

## 2024-08-31 RX ORDER — BISACODYL 10 MG
10 SUPPOSITORY, RECTAL RECTAL DAILY PRN
Status: DISCONTINUED | OUTPATIENT
Start: 2024-08-31 | End: 2024-09-05 | Stop reason: HOSPADM

## 2024-08-31 RX ORDER — AMOXICILLIN 250 MG
2 CAPSULE ORAL 2 TIMES DAILY PRN
Status: DISCONTINUED | OUTPATIENT
Start: 2024-08-31 | End: 2024-09-05 | Stop reason: HOSPADM

## 2024-08-31 RX ORDER — ESCITALOPRAM OXALATE 20 MG/1
20 TABLET ORAL NIGHTLY
Status: DISCONTINUED | OUTPATIENT
Start: 2024-08-31 | End: 2024-09-05 | Stop reason: HOSPADM

## 2024-08-31 RX ORDER — BISACODYL 5 MG/1
5 TABLET, DELAYED RELEASE ORAL DAILY PRN
Status: DISCONTINUED | OUTPATIENT
Start: 2024-08-31 | End: 2024-09-05 | Stop reason: HOSPADM

## 2024-08-31 RX ORDER — METOPROLOL TARTRATE 25 MG/1
25 TABLET, FILM COATED ORAL NIGHTLY
Status: DISCONTINUED | OUTPATIENT
Start: 2024-08-31 | End: 2024-09-01

## 2024-08-31 RX ADMIN — ACETAMINOPHEN 325MG 650 MG: 325 TABLET ORAL at 20:26

## 2024-08-31 RX ADMIN — LEVOTHYROXINE SODIUM 75 MCG: 75 TABLET ORAL at 13:24

## 2024-08-31 RX ADMIN — ATORVASTATIN CALCIUM 10 MG: 20 TABLET, FILM COATED ORAL at 20:26

## 2024-08-31 RX ADMIN — Medication 10 ML: at 20:26

## 2024-08-31 RX ADMIN — CEFTRIAXONE SODIUM 1000 MG: 1 INJECTION, POWDER, FOR SOLUTION INTRAMUSCULAR; INTRAVENOUS at 09:33

## 2024-08-31 RX ADMIN — ACETAMINOPHEN 500 MG: 500 TABLET ORAL at 08:41

## 2024-08-31 RX ADMIN — METOPROLOL TARTRATE 25 MG: 25 TABLET, FILM COATED ORAL at 09:33

## 2024-08-31 RX ADMIN — ESCITALOPRAM 20 MG: 20 TABLET, FILM COATED ORAL at 21:48

## 2024-08-31 RX ADMIN — Medication 10 ML: at 09:35

## 2024-08-31 RX ADMIN — Medication 10 ML: at 09:34

## 2024-08-31 NOTE — PLAN OF CARE
Goal Outcome Evaluation:              Outcome Evaluation: pt is a 89 year od female aadmitted to the obs unit for UTI, pt is aox2-3 pt is alert to self but can have periods of confusion about place and situation,, cultures pending, nuerology is consulted, bed alarm active, pt has no further questions at this time, pt is resting at this time

## 2024-08-31 NOTE — PLAN OF CARE
Goal Outcome Evaluation:  Plan of Care Reviewed With: patient      Pt is 89 y/o F admitted to [facility] on 8/31/24 with headache, fever, and noted to have possible UTI. At baseline pt is from [facility], states she walks with a walker indep in her room, has assist with bathing and dressing. Pt currently demos mod A for bed mobility and max A with rwx to stand. Only able to stand x 1 despite 3 attempts. Unable to progress to ambulation today. Reports throbbing headache throughout, RN updated. Pt demos mobility below baseline and therefore would benefit from acute skilled PT to address functional mobility deficits. Anticipate d/c to SNU vs [facility] pending progress.              Anticipated Discharge Disposition (PT): skilled nursing facility, assisted living (pending progress)

## 2024-08-31 NOTE — ED NOTES
Nursing report ED to floor  Marianna Terrell  89 y.o.  female    HPI :  HPI (Adult)  Stated Reason for Visit: HA, fever  History Obtained From: patient  Duration (Days): 2    Chief Complaint  Chief Complaint   Patient presents with    Headache    Fever       Admitting doctor:   Gregg Mcrae MD    Admitting diagnosis:   The primary encounter diagnosis was Urinary tract infection without hematuria, site unspecified. Diagnoses of Nonintractable headache, unspecified chronicity pattern, unspecified headache type and Hypertension not at goal were also pertinent to this visit.    Code status:   Current Code Status       Date Active Code Status Order ID Comments User Context       Prior            Allergies:   Patient has no known allergies.    Isolation:   No active isolations    Intake and Output  No intake or output data in the 24 hours ending 08/31/24 0935    Weight:       08/31/24  0733   Weight: 64.3 kg (141 lb 12.1 oz)       Most recent vitals:   Vitals:    08/31/24 0736 08/31/24 0739 08/31/24 0743 08/31/24 0745   BP: (!) 185/92  180/79    Pulse:  90 91 85   Resp:       Temp:       TempSrc:       SpO2:  96% 92% 92%   Weight:       Height:           Active LDAs/IV Access:   Lines, Drains & Airways       Active LDAs       Name Placement date Placement time Site Days    Peripheral IV 08/31/24 0744 Anterior;Left Forearm 08/31/24  0744  Forearm  less than 1    External Urinary Catheter 08/31/24  0739  --  less than 1                    Labs (abnormal labs have a star):   Labs Reviewed   COMPREHENSIVE METABOLIC PANEL - Abnormal; Notable for the following components:       Result Value    Glucose 131 (*)     All other components within normal limits    Narrative:     GFR Normal >60  Chronic Kidney Disease <60  Kidney Failure <15    The GFR formula is only valid for adults with stable renal function between ages 18 and 70.   SINGLE HS TROPONIN T - Abnormal; Notable for the following components:    HS Troponin T 22 (*)      All other components within normal limits    Narrative:     High Sensitive Troponin T Reference Range:  <14.0 ng/L- Negative Female for AMI  <22.0 ng/L- Negative Male for AMI  >=14 - Abnormal Female indicating possible myocardial injury.  >=22 - Abnormal Male indicating possible myocardial injury.   Clinicians would have to utilize clinical acumen, EKG, Troponin, and serial changes to determine if it is an Acute Myocardial Infarction or myocardial injury due to an underlying chronic condition.        CBC WITH AUTO DIFFERENTIAL - Abnormal; Notable for the following components:    WBC 11.00 (*)     MCV 97.1 (*)     RDW 11.9 (*)     Lymphocyte % 16.3 (*)     Immature Grans % 0.6 (*)     Neutrophils, Absolute 8.16 (*)     Immature Grans, Absolute 0.07 (*)     All other components within normal limits   URINALYSIS W/ CULTURE IF INDICATED - Abnormal; Notable for the following components:    Appearance, UA Turbid (*)     Blood, UA Small (1+) (*)     Protein, UA Trace (*)     Leuk Esterase, UA Large (3+) (*)     Nitrite, UA Positive (*)     All other components within normal limits    Narrative:     In absence of clinical symptoms, the presence of pyuria, bacteria, and/or nitrites on the urinalysis result does not correlate with infection.   URINALYSIS, MICROSCOPIC ONLY - Abnormal; Notable for the following components:    RBC, UA 3-5 (*)     WBC, UA Too Numerous to Count (*)     Bacteria, UA 4+ (*)     All other components within normal limits   RESPIRATORY PANEL PCR W/ COVID-19 (SARS-COV-2), NP SWAB IN UTM/VTP, 2 HR TAT - Normal    Narrative:     In the setting of a positive respiratory panel with a viral infection PLUS a negative procalcitonin without other underlying concern for bacterial infection, consider observing off antibiotics or discontinuation of antibiotics and continue supportive care. If the respiratory panel is positive for atypical bacterial infection (Bordetella pertussis, Chlamydophila pneumoniae, or  "Mycoplasma pneumoniae), consider antibiotic de-escalation to target atypical bacterial infection.   BNP (IN-HOUSE) - Normal    Narrative:     This assay is used as an aid in the diagnosis of individuals suspected of having heart failure. It can be used as an aid in the diagnosis of acute decompensated heart failure (ADHF) in patients presenting with signs and symptoms of ADHF to the emergency department (ED). In addition, NT-proBNP of <300 pg/mL indicates ADHF is not likely.    Age Range Result Interpretation  NT-proBNP Concentration (pg/mL:      <50             Positive            >450                   Gray                 300-450                    Negative             <300    50-75           Positive            >900                  Gray                300-900                  Negative            <300      >75             Positive            >1800                  Gray                300-1800                  Negative            <300   LACTIC ACID, PLASMA - Normal   PROCALCITONIN - Normal    Narrative:     As a Marker for Sepsis (Non-Neonates):    1. <0.5 ng/mL represents a low risk of severe sepsis and/or septic shock.  2. >2 ng/mL represents a high risk of severe sepsis and/or septic shock.    As a Marker for Lower Respiratory Tract Infections that require antibiotic therapy:    PCT on Admission    Antibiotic Therapy       6-12 Hrs later    >0.5                Strongly Recommended  >0.25 - <0.5        Recommended   0.1 - 0.25          Discouraged              Remeasure/reassess PCT  <0.1                Strongly Discouraged     Remeasure/reassess PCT    As 28 day mortality risk marker: \"Change in Procalcitonin Result\" (>80% or <=80%) if Day 0 (or Day 1) and Day 4 values are available. Refer to http://[email or web address]-pct-calculator.com    Change in PCT <=80%  A decrease of PCT levels below or equal to 80% defines a positive change in PCT test result representing a higher risk for 28-day all-cause mortality of " patients diagnosed with severe sepsis for septic shock.    Change in PCT >80%  A decrease of PCT levels of more than 80% defines a negative change in PCT result representing a lower risk for 28-day all-cause mortality of patients diagnosed with severe sepsis or septic shock.      URINE CULTURE   CBC AND DIFFERENTIAL    Narrative:     The following orders were created for panel order CBC & Differential.  Procedure                               Abnormality         Status                     ---------                               -----------         ------                     CBC Auto Differential[[id number]]        Abnormal            Final result                 Please view results for these tests on the individual orders.       EKG:   No orders to display       Meds given in ED:   Medications   sodium chloride 0.9 % flush 10 mL (has no administration in time range)   cefTRIAXone (ROCEPHIN) 1,000 mg in sodium chloride 0.9 % 100 mL MBP (1,000 mg Intravenous New Bag 8/31/24 0933)   cefTRIAXone (ROCEPHIN) 1,000 mg in sodium chloride 0.9 % 100 mL MBP (has no administration in time range)   sodium chloride 0.9 % flush 10 mL (10 mL Intravenous Given 8/31/24 0935)   sodium chloride 0.9 % flush 10 mL (10 mL Intravenous Given 8/31/24 0934)   sodium chloride 0.9 % infusion 40 mL (has no administration in time range)   Potassium Replacement - Follow Nurse / BPA Driven Protocol (has no administration in time range)   Magnesium Standard Dose Replacement - Follow Nurse / BPA Driven Protocol (has no administration in time range)   Phosphorus Replacement - Follow Nurse / BPA Driven Protocol (has no administration in time range)   Calcium Replacement - Follow Nurse / BPA Driven Protocol (has no administration in time range)   acetaminophen (TYLENOL) tablet 650 mg (has no administration in time range)     Or   acetaminophen (TYLENOL) 160 MG/5ML oral solution 650 mg (has no administration in time range)     Or   acetaminophen (TYLENOL)  suppository 650 mg (has no administration in time range)   sennosides-docusate (PERICOLACE) 8.6-50 MG per tablet 2 tablet (has no administration in time range)     And   polyethylene glycol (MIRALAX) packet 17 g (has no administration in time range)     And   bisacodyl (DULCOLAX) EC tablet 5 mg (has no administration in time range)     And   bisacodyl (DULCOLAX) suppository 10 mg (has no administration in time range)   acetaminophen (TYLENOL) tablet 500 mg (500 mg Oral Given 8/31/24 0841)   metoprolol tartrate (LOPRESSOR) tablet 25 mg (25 mg Oral Given 8/31/24 0933)       Imaging results:  CT Head Without Contrast    Result Date: 8/31/2024  Extensive chronic small vessel ischemic white matter change and atrophy. No evidence for acute intracranial abnormality.  This report was finalized on 8/31/2024 8:26 AM by Isrrael Barnett M.D on Workstation: [id number]      XR Chest 1 View    Result Date: 8/31/2024  Low lung volumes without opacities identified.  This report was finalized on 8/31/2024 8:19 AM by Dr. Marco Antonio Thornton M.D on Workstation: [id number]       Ambulatory status:   - slide    Social issues:   Social History     Socioeconomic History    Marital status: Other   Tobacco Use    Smoking status: Never    Smokeless tobacco: Never   Vaping Use    Vaping status: Never Used   Substance and Sexual Activity    Alcohol use: Never    Drug use: Never    Sexual activity: Not Currently       Peripheral Neurovascular  Peripheral Neurovascular (Adult)  Peripheral Neurovascular WDL: WDL    Neuro Cognitive  Neuro Cognitive (Adult)  Cognitive/Neuro/Behavioral WDL: .WDL except (patient has dementia)  Mee Coma Scale  Best Eye Response: 4-->(E4) spontaneous  Best Motor Response: 6-->(M6) obeys commands  Best Verbal Response: 5-->(V5) oriented  L[place]low Coma Scale Score: 15    Learning  Learning Assessment (Adult)  Learning Readiness and Ability: cognitive limitation noted  Education Provided  Person Taught:  patient    Respiratory  Respiratory WDL  Respiratory WDL: WDL (patient denies any trouble breathing, denies cough)    Abdominal Pain       Pain Assessments  Pain (Adult)  (0-10) Pain Rating: Rest: 7  (0-10) Pain Rating: Activity: 7  Pain Location: head  Response to Pain Interventions: intervention not effective per patient    NIH Stroke Scale       Esthela Kinney RN  08/31/24 09:35 EDT

## 2024-08-31 NOTE — PROGRESS NOTES
MD ATTESTATION NOTE    The MERRITT and I have discussed this patient's history, physical exam, and treatment plan.  I have reviewed the documentation and personally had a face to face interaction with the patient. I affirm the documentation and agree with the treatment and plan.  The attached note describes my personal findings.      I provided a substantive portion of the care of the patient.  I personally performed the physical exam in its entirety, and below are my findings.     Brief HPI: This patient is a 89-year-old female admitted to the observation unit for treatment and management of urinary tract infection.  Her initial complaints were fever/chills as well as a generalized global headache that is now been present for the last couple of days.  Currently, she does complain of a mild headache however improved from initial ED presentation.  She denies chest pain, shortness of breath, back pain, nausea/vomiting, or fever/chills.      PHYSICAL EXAM  ED Triage Vitals [08/31/24 0724]   Temp Heart Rate Resp BP SpO2   99 °F (37.2 °C) 87 26 178/80 96 %      Temp src Heart Rate Source Patient Position BP Location FiO2 (%)   Oral Monitor -- -- --         GENERAL: Resting comfortably and in no acute distress, nontoxic in appearance  HENT: nares patent  EYES: no scleral icterus  CV: regular rhythm, normal rate, no M/R/G  RESPIRATORY: normal effort, lungs clear bilaterally  ABDOMEN: soft, nontender, no rebound or guarding  MUSCULOSKELETAL: no deformity, no edema  NEURO: alert, moves all extremities, follows commands  PSYCH:  calm, cooperative  SKIN: warm, dry    Vital signs and nursing notes reviewed.        Plan: The patient's head CT is unremarkable however urinalysis is consistent with a nitrite positive urinary tract infection.  We will continue fluid hydration as well as treatment with IV antibiotics.  Will admit her to the observation unit for further management and asked physical therapy to see in consultation.   Further plan to follow.

## 2024-08-31 NOTE — CASE MANAGEMENT/SOCIAL WORK
Discharge Planning Assessment  [facility]     Patient Name: Marianna Terrell  MRN: [id number]  Today's Date: 8/31/2024    Admit Date: 8/31/2024    Plan: Plans to return to AL facility at d/c-TREVON Ware RN   Discharge Needs Assessment       Row Name 08/31/24 1152       Living Environment    People in Home other (see comments)  [facility]    Current Living Arrangements assisted living facility    Potentially Unsafe Housing Conditions none    In the past 12 months has the electric, gas, oil, or water company threatened to shut off services in your home? No    Primary Care Provided by other (see comments)  facility staff    Provides Primary Care For no one    Family Caregiver if Needed child(genesis), adult    Family Caregiver Names Pineda [phone]    Quality of Family Relationships supportive    Able to Return to Prior Arrangements yes       Resource/Environmental Concerns    Resource/Environmental Concerns none    Transportation Concerns none       Transportation Needs    In the past 12 months, has lack of transportation kept you from medical appointments or from getting medications? no    In the past 12 months, has lack of transportation kept you from meetings, work, or from getting things needed for daily living? No       Food Insecurity    Within the past 12 months, you worried that your food would run out before you got the money to buy more. Never true    Within the past 12 months, the food you bought just didn't last and you didn't have money to get more. Never true       Transition Planning    Patient/Family Anticipates Transition to other (see comments)  return to AL    Patient/Family Anticipated Services at Transition none    Transportation Anticipated family or friend will provide       Discharge Needs Assessment    Equipment Currently Used at Home rollator;wheelchair  W/C at times; has a nebulizer-not using it    Concerns to be Addressed no discharge needs identified    Anticipated Changes  Related to Illness none    Equipment Needed After Discharge none    Provided Post Acute Provider List? N/A    Provided Post Acute Provider Quality & Resource List? N/A                   Discharge Plan       Row Name 08/31/24 8682       Plan    Plan Plans to return to AL facility at d/c-TREVON Ware RN    Patient/Family in Agreement with Plan yes    Provided Post Acute Provider List? N/A    Provided Post Acute Provider Quality & Resource List? N/A    Plan Comments Spoke w/ pt at bedside and w/ son Pineda per phone w/ patient's permission. Introduced self and explained role. All info on facesheet verified. No PCP at present time-shanthi Sung has info and is working on it. Lives in AL facility and staff assists w/ provides all ADLs. Uses rollator; uses W/C at times; has nebulizer, but not using it at present time; Uses shower chair and grab bars. Denies need for [facility] or community resources at present time. uses [facility] and is able to obtain and pay for meds. To return to AL facility at d/c; son will transport; agreeable w/ plan. CM will continue to follow-TREVON Ware RN                  Continued Care and Services - Admitted Since 8/31/2024    No active coordination exists for this encounter.       Selected Continued Care - Prior Encounters Includes continued care and service providers with selected services from prior encounters from 6/2/2024 to 8/31/2024      Discharged on 7/3/2024 Admission date: 6/28/2024 - Discharge disposition: Skilled Nursing Facility (DC - External)      Destination       Service Provider Selected Services Address Phone Fax Patient Preferred    [facility] [address] [id number] [phone] --                             Demographic Summary       Row Name 08/31/24 115       General Information    Admission Type observation    Arrived From emergency department    Required Notices Provided Observation Status Notice     Referral Source admission list;nursing    Reason for Consult discharge planning    Preferred Language English       Contact Information    Permission Granted to Share Info With ;family/designee                   Functional Status       Row Name 08/31/24 1152       Functional Status    Usual Activity Tolerance moderate    Current Activity Tolerance moderate       Functional Status, IADL    IADL Comments facility provides services llisted above       Mental Status    General Appearance WDL WDL       Mental Status Summary    Recent Changes in Mental Status/Cognitive Functioning no changes                   Psychosocial       Row Name 08/31/24 1152       Behavior WDL    Behavior WDL WDL       Emotion Mood WDL    Emotion/Mood/Affect WDL WDL       Speech WDL    Speech WDL WDL       Perceptual State WDL    Perceptual State WDL WDL       Thought Process WDL    Thought Process WDL WDL       Intellectual Performance WDL    Intellectual Performance WDL WDL    Level of Consciousness Alert                   Abuse/Neglect    No documentation.                  Legal    No documentation.                  Substance Abuse    No documentation.                  Patient Forms    No documentation.                     Cora Ware RN

## 2024-08-31 NOTE — ED PROVIDER NOTES
EMERGENCY DEPARTMENT ENCOUNTER  Room Number:  11/11  PCP: Provider, No Known  Independent Historians: Patient and EMS      HPI:  Chief Complaint: had concerns including Headache and Fever.     A complete HPI/ROS/PMH/PSH/SH/FH are unobtainable due to: Poor historian    Chronic or social conditions impacting patient care (Social Determinants of Health): None      Context: Marianna Terrell is a 89 y.o. female with a medical history of hypothyroidism, high cholesterol and dementia who presents to the ED c/o acute headache for the past 2 days.  Patient presents via EMS from assisted living facility because of her persistent complaint of frontal headache for the past 2 days.  She had a fall sometime last week and was evaluated here with a head CT that was unremarkable for acute intracranial injury.  For the past 2 days, however, she has had this persistent headache that will not subside and there is also report of a intermittent fever problem.  When paramedics arrived, they found the patient to be hypoxic on room air at 88%.  They placed her on nasal cannula and she normally does not require any supplemental oxygen.  Patient denies any abdominal pain or vomiting or diarrhea.  She denies any chest pain.  She denies any shortness of breath right now.      Review of prior external notes (non-ED) -and- Review of prior external test results outside of this encounter: I independently reviewed the discharge summary from July 3, 2024 when patient was admitted for management of a UTI.  Cultures grew E. coli and she was started on Rocephin initially.    Prescription drug monitoring program review: [facility] reviewed by Gregg Mcrae MD, Ed Anna MD       PAST MEDICAL HISTORY  Active Ambulatory Problems     Diagnosis Date Noted    UTI (urinary tract infection) 06/29/2024    Dementia 06/29/2024    Hypothyroidism (acquired) 06/29/2024    CKD (chronic kidney disease) 06/29/2024    Fall 06/29/2024    Hyperlipidemia 06/29/2024      Resolved Ambulatory Problems     Diagnosis Date Noted    No Resolved Ambulatory Problems     Past Medical History:   Diagnosis Date    Anxiety     Chronic kidney disease (CKD), stage IV (severe)     Difficulty walking 01-22    High cholesterol     HL (hearing loss) 01-22    Hypothyroid          PAST SURGICAL HISTORY  Past Surgical History:   Procedure Laterality Date    CYSTECTOMY      HYSTERECTOMY      KNEE SURGERY           FAMILY HISTORY  Family History   Problem Relation Age of Onset    Breast cancer Paternal Aunt     Ovarian cancer Neg Hx     Uterine cancer Neg Hx     Colon cancer Neg Hx     Deep vein thrombosis Neg Hx     Pulmonary embolism Neg Hx          SOCIAL HISTORY  Social History     Socioeconomic History    Marital status: Other   Tobacco Use    Smoking status: Never    Smokeless tobacco: Never   Vaping Use    Vaping status: Never Used   Substance and Sexual Activity    Alcohol use: Never    Drug use: Never    Sexual activity: Not Currently         ALLERGIES  Patient has no known allergies.      REVIEW OF SYSTEMS  Review of Systems  Included in HPI  All systems reviewed and negative except for those discussed in HPI.      PHYSICAL EXAM    I have reviewed the triage vital signs and nursing notes.    ED Triage Vitals [08/31/24 0724]   Temp Heart Rate Resp BP SpO2   99 °F (37.2 °C) 87 26 178/80 96 %      Temp src Heart Rate Source Patient Position BP Location FiO2 (%)   Oral Monitor -- -- --       Physical Exam  GENERAL: alert, no acute distress, elderly and frail-appearing  SKIN: Warm, dry, no rashes  HENT: Normocephalic, atraumatic  EYES: no scleral icterus, normal conjunctivae  CV: regular rhythm, regular rate, systolic murmur present  RESPIRATORY: normal effort, lungs clear bilaterally, no stridor  ABDOMEN: soft, nondistended, nontender in all 4 quadrants  MUSCULOSKELETAL: no deformity, no asymmetry of extremities  NEURO: alert, moves all extremities, follows commands      LAB RESULTS  Recent  Results (from the past 24 hour(s))   Respiratory Panel PCR w/COVID-19(SARS-CoV-2) [facility]/NICKY/ZULEYKA/PAD/[facility]/MANDY In-House, NP Swab in UTM/VTM, 2 HR TAT - Swab, Nasopharynx    Collection Time: 08/31/24  7:44 AM    Specimen: Nasopharynx; Swab   Result Value Ref Range    ADENOVIRUS, PCR Not Detected Not Detected    Coronavirus 229E Not Detected Not Detected    Coronavirus HKU1 Not Detected Not Detected    Coronavirus NL63 Not Detected Not Detected    Coronavirus OC43 Not Detected Not Detected    COVID19 Not Detected Not Detected - Ref. Range    Human Metapneumovirus Not Detected Not Detected    Human Rhinovirus/Enterovirus Not Detected Not Detected    Influenza A PCR Not Detected Not Detected    Influenza B PCR Not Detected Not Detected    Parainfluenza Virus 1 Not Detected Not Detected    Parainfluenza Virus 2 Not Detected Not Detected    Parainfluenza Virus 3 Not Detected Not Detected    Parainfluenza Virus 4 Not Detected Not Detected    RSV, PCR Not Detected Not Detected    Bordetella pertussis pcr Not Detected Not Detected    Bordetella parapertussis PCR Not Detected Not Detected    Chlamydophila pneumoniae PCR Not Detected Not Detected    Mycoplasma pneumo by PCR Not Detected Not Detected   Comprehensive Metabolic Panel    Collection Time: 08/31/24  7:45 AM    Specimen: Blood   Result Value Ref Range    Glucose 131 (H) 65 - 99 mg/dL    BUN 14 8 - 23 mg/dL    Creatinine 0.59 0.57 - 1.00 mg/dL    Sodium 137 136 - 145 mmol/L    Potassium 4.0 3.5 - 5.2 mmol/L    Chloride 102 98 - 107 mmol/L    CO2 25.0 22.0 - 29.0 mmol/L    Calcium 9.1 8.2 - 9.6 mg/dL    Total Protein 6.7 6.0 - 8.5 g/dL    Albumin 4.1 3.5 - 5.2 g/dL    ALT (SGPT) 11 1 - 33 U/L    AST (SGOT) 9 1 - 32 U/L    Alkaline Phosphatase 59 39 - 117 U/L    Total Bilirubin 1.0 0.0 - 1.2 mg/dL    Globulin 2.6 gm/dL    A/G Ratio 1.6 g/dL    BUN/Creatinine Ratio 23.7 7.0 - 25.0    Anion Gap 10.0 5.0 - 15.0 mmol/L    eGFR 84.2 >60.0 mL/min/1.73   Single High Sensitivity  Troponin T    Collection Time: 08/31/24  7:45 AM    Specimen: Blood   Result Value Ref Range    HS Troponin T 22 (H) <14 ng/L   BNP    Collection Time: 08/31/24  7:45 AM    Specimen: Blood   Result Value Ref Range    proBNP 386.0 0.0 - 1,800.0 pg/mL   Lactic Acid, Plasma    Collection Time: 08/31/24  7:45 AM    Specimen: Blood   Result Value Ref Range    Lactate 1.1 0.5 - 2.0 mmol/L   Procalcitonin    Collection Time: 08/31/24  7:45 AM    Specimen: Blood   Result Value Ref Range    Procalcitonin 0.05 0.00 - 0.25 ng/mL   CBC Auto Differential    Collection Time: 08/31/24  7:45 AM    Specimen: Blood   Result Value Ref Range    WBC 11.00 (H) 3.40 - 10.80 10*3/mm3    RBC 4.09 3.77 - 5.28 10*6/mm3    Hemoglobin 13.1 12.0 - 15.9 g/dL    Hematocrit 39.7 34.0 - 46.6 %    MCV 97.1 (H) 79.0 - 97.0 fL    MCH 32.0 26.6 - 33.0 pg    MCHC 33.0 31.5 - 35.7 g/dL    RDW 11.9 (L) 12.3 - 15.4 %    RDW-SD 42.5 37.0 - 54.0 fl    MPV 9.9 6.0 - 12.0 fL    Platelets 217 140 - 450 10*3/mm3    Neutrophil % 74.1 42.7 - 76.0 %    Lymphocyte % 16.3 (L) 19.6 - 45.3 %    Monocyte % 7.4 5.0 - 12.0 %    Eosinophil % 1.2 0.3 - 6.2 %    Basophil % 0.4 0.0 - 1.5 %    Immature Grans % 0.6 (H) 0.0 - 0.5 %    Neutrophils, Absolute 8.16 (H) 1.70 - 7.00 10*3/mm3    Lymphocytes, Absolute 1.79 0.70 - 3.10 10*3/mm3    Monocytes, Absolute 0.81 0.10 - 0.90 10*3/mm3    Eosinophils, Absolute 0.13 0.00 - 0.40 10*3/mm3    Basophils, Absolute 0.04 0.00 - 0.20 10*3/mm3    Immature Grans, Absolute 0.07 (H) 0.00 - 0.05 10*3/mm3    nRBC 0.0 0.0 - 0.2 /100 WBC   Urinalysis With Culture If Indicated - Straight Cath    Collection Time: 08/31/24  8:09 AM    Specimen: Straight Cath; Urine   Result Value Ref Range    Color, UA Yellow Yellow, Straw    Appearance, UA Turbid (A) Clear    pH, UA 7.5 5.0 - 8.0    Specific Gravity, UA 1.016 1.005 - 1.030    Glucose, UA Negative Negative    Ketones, UA Negative Negative    Bilirubin, UA Negative Negative    Blood, UA Small (1+) (A)  Negative    Protein, UA Trace (A) Negative    Leuk Esterase, UA Large (3+) (A) Negative    Nitrite, UA Positive (A) Negative    Urobilinogen, UA 1.0 E.U./dL 0.2 - 1.0 E.U./dL   Urinalysis, Microscopic Only - Straight Cath    Collection Time: 08/31/24  8:09 AM    Specimen: Straight Cath; Urine   Result Value Ref Range    RBC, UA 3-5 (A) None Seen, 0-2 /HPF    WBC, UA Too Numerous to Count (A) None Seen, 0-2 /HPF    Bacteria, UA 4+ (A) None Seen /HPF    Squamous Epithelial Cells, UA 0-2 None Seen, 0-2 /HPF    Hyaline Casts, UA 3-6 None Seen /LPF    Methodology Automated Microscopy          RADIOLOGY  CT Head Without Contrast    Result Date: 8/31/2024  CT HEAD WITHOUT CONTRAST  HISTORY: Acute headache for the past 2 days. Fall sometime last week.  TECHNIQUE:  Head CT includes axial imaging from the base of skull to the vertex without intravenous contrast. Radiation dose reduction techniques were utilized, including automated exposure control and exposure modulation based on body size.  COMPARISON: CT head 08/21/2024, 06/28/2024  FINDINGS: There are no abnormal areas of increased attenuation intra-axially to suggest hemorrhage. No extra-axial fluid collection is observed. There is no evidence for mass effect or shift of the midline structures. There is diffuse cerebral and cerebellar atrophy with extensive chronic small vessel ischemic white matter change that is without evidence for change compared to the previous study.      Extensive chronic small vessel ischemic white matter change and atrophy. No evidence for acute intracranial abnormality.  This report was finalized on 8/31/2024 8:26 AM by Isrrael Barnett M.D on Workstation: [id number]      XR Chest 1 View    Result Date: 8/31/2024  XR CHEST 1 VW-   INDICATION: Fever, hypoxia  COMPARISON: Chest radiograph June 28, 2024  TECHNIQUE: 1 view chest  FINDINGS:  Low lung volumes. No opacities seen. No effusions. Stable mediastinum. Cholecystectomy clips.      Low lung  volumes without opacities identified.  This report was finalized on 8/31/2024 8:19 AM by Dr. Marco Antonio Thornton M.D on Workstation: [id number]         MEDICATIONS GIVEN IN ER  Medications   sodium chloride 0.9 % flush 10 mL (has no administration in time range)   cefTRIAXone (ROCEPHIN) 1,000 mg in sodium chloride 0.9 % 100 mL MBP (has no administration in time range)   cefTRIAXone (ROCEPHIN) 1,000 mg in sodium chloride 0.9 % 100 mL MBP (has no administration in time range)   acetaminophen (TYLENOL) tablet 500 mg (500 mg Oral Given 8/31/24 0841)         ORDERS PLACED DURING THIS VISIT:  Orders Placed This Encounter   Procedures    Respiratory Panel PCR w/COVID-19(SARS-CoV-2) [facility]/N[facility]/ZU[facility]KA/[facility]Y In-House, NP Swab in UTM/VTM, 2 HR TAT - Swab, Nasopharynx    Urine Culture - Urine,    XR Chest 1 View    CT Head Without Contrast    Comprehensive Metabolic Panel    Single High Sensitivity Troponin T    BNP    Lactic Acid, Plasma    Procalcitonin    CBC Auto Differential    Urinalysis With Culture If Indicated - Urine, Catheter    Urinalysis, Microscopic Only - Urine, Clean Catch    Monitor Blood Pressure    Continuous Pulse Oximetry    Insert Peripheral IV    Initiate ED Observation Status    CBC & Differential         OUTPATIENT MEDICATION MANAGEMENT:  Current Facility-Administered Medications Ordered in Epic   Medication Dose Route Frequency Provider Last Rate Last Admin    cefTRIAXone (ROCEPHIN) 1,000 mg in sodium chloride 0.9 % 100 mL MBP  1,000 mg Intravenous Once Ed Anna MD        [START ON 9/1/2024] cefTRIAXone (ROCEPHIN) 1,000 mg in sodium chloride 0.9 % 100 mL MBP  1,000 mg Intravenous Q24H Sobeida Salazar, SONDRA        sodium chloride 0.9 % flush 10 mL  10 mL Intravenous PRN Ed Anna MD         Current Outpatient Medications Ordered in Epic   Medication Sig Dispense Refill    aspirin 81 MG tablet Take 1 tablet by mouth.      calcium citrate-vitamin d (CITRACAL) 200-250 MG-UNIT tablet tablet  Take 1 tablet by mouth Daily.      escitalopram (LEXAPRO) 5 MG tablet Take 4 tablets by mouth.      levothyroxine (SYNTHROID, LEVOTHROID) 75 MCG tablet Take 1 tablet by mouth Daily.      metoprolol tartrate (LOPRESSOR) 25 MG tablet Take 1 tablet by mouth Every 12 (Twelve) Hours.      Multiple Vitamin (MULTIVITAMIN) tablet Take 1 tablet by mouth Daily.      Omega-3 Fatty Acids (FISH OIL) 1200 MG capsule capsule Take 1 capsule by mouth Daily.      oxybutynin (DITROPAN) 5 MG tablet Take 1 tablet by mouth 2 (Two) Times a Day.      senna-docusate (PERICOLACE) 8.6-50 MG per tablet Take 2 tablets by mouth.      simvastatin (ZOCOR) 20 MG tablet Take 1 tablet by mouth Daily.      vitamin E 400 UNIT capsule Take 1 capsule by mouth.           PROCEDURES  Procedures        PROGRESS, DATA ANALYSIS, CONSULTS, AND MEDICAL DECISION MAKING  All labs have been independently interpreted by me.  All radiology studies have been reviewed by me. All EKG's have been independently viewed and interpreted by me.  Discussion below represents my analysis of pertinent findings related to patient's condition, differential diagnosis, treatment plan and final disposition.    Differential diagnosis includes but is not limited to subarachnoid hemorrhage, subdural hematoma, migraine headache, tension headache viral illness, pneumonia, UTI, COVID-19, influenza.    Clinical Scores:                   ED Course as of 08/31/24 0925   Sat Aug 31, 2024   0752 Will repeat head CT given patient's complaint of escalating headache for the past 2 days.  Will need to rule out a delayed intracranial hemorrhage process.  Also proceeding with an infectious workup given reported fever. [HAMLET]   0838 Nitrite, UA(!): Positive [HAMLET]   0838 I independently interpreted the Head CT w/o Contrast and my findings are: No acute hemorrhage, no midline shift   [HAMLET]   0839 I independently interpreted the Chest X-ray and my findings are: Low lung volumes, no Pneumothorax, No Effusion,  No Infiltrate   [HAMLET]   0839 WBC(!): 11.00 [HAMLET]   0909 WBC, UA(!): Too Numerous to Count [HAMLET]   0909 Bacteria, UA(!): 4+ [HAMLET]   0909 Respiratory viral panel is unremarkable. [HAMLET]   0909 Lactate: 1.1 [HAMLET]   0922 Ordering initial dose of IV antibiotics for UTI findings. [HAMLET]   0922 Patient complaining of headache primarily.  Overall, her neurologic assessment is not worrisome.  I do not think there is any indication for further imaging besides the noncontrast head CT right now.  Given her age and comorbidities, I think it would be marquez to keep her in the observation unit for treatment of her UTI and cephalgia symptoms.  Hopefully she could transition to oral antibiotics and discharged back to her assisted living facility within the next 24 hours or so.  She is agreeable with this plan. [HAMLET]   0923 I discussed with SONDRA Ocampo, in the observation unit.  She agrees to accept the patient on behalf of Dr. Mcrae for further medical management today. [HAMLET]   0924 HS Troponin T(!): 22 [HAMLET]      ED Course User Index  [HAMLET] Ed Anna MD             AS OF 09:25 EDT VITALS:    BP - 180/79  HR - 85  TEMP - 99 °F (37.2 °C) (Oral)  O2 SATS - 92%    COMPLEXITY OF CARE  The patient requires admission.      DIAGNOSIS  Final diagnoses:   Urinary tract infection without hematuria, site unspecified   Nonintractable headache, unspecified chronicity pattern, unspecified headache type   Hypertension not at goal         DISPOSITION  ED Disposition       ED Disposition   Intended Admit    Condition   --    Comment   --                Please note that portions of this document were completed with a voice recognition program.    Note Disclaimer: At [facility], we believe that sharing information builds trust and better relationships. You are receiving this note because you recently visited [facility]. It is possible you will see health information before a provider has talked with you about it. This kind of information can  be easy to misunderstand. To help you fully understand what it means for your health, we urge you to discuss this note with your provider.         Ed Anna MD  08/31/24 0925       Ed Anna MD  08/31/24 0925

## 2024-08-31 NOTE — ED NOTES
Nursing report ED to floor  Marianna Terrell  89 y.o.  female    HPI :  HPI (Adult)  Stated Reason for Visit: HA, fever  History Obtained From: patient  Duration (Days): 2    Chief Complaint  Chief Complaint   Patient presents with    Headache    Fever       Admitting doctor:   Gregg Mcrae MD    Admitting diagnosis:   The primary encounter diagnosis was Urinary tract infection without hematuria, site unspecified. Diagnoses of Nonintractable headache, unspecified chronicity pattern, unspecified headache type and Hypertension not at goal were also pertinent to this visit.    Code status:   Current Code Status       Date Active Code Status Order ID Comments User Context       Prior            Allergies:   Patient has no known allergies.    Isolation:   No active isolations    Intake and Output  No intake or output data in the 24 hours ending 08/31/24 0934    Weight:       08/31/24  0733   Weight: 64.3 kg (141 lb 12.1 oz)       Most recent vitals:   Vitals:    08/31/24 0736 08/31/24 0739 08/31/24 0743 08/31/24 0745   BP: (!) 185/92  180/79    Pulse:  90 91 85   Resp:       Temp:       TempSrc:       SpO2:  96% 92% 92%   Weight:       Height:           Active LDAs/IV Access:   Lines, Drains & Airways       Active LDAs       Name Placement date Placement time Site Days    Peripheral IV 08/31/24 0744 Anterior;Left Forearm 08/31/24  0744  Forearm  less than 1    External Urinary Catheter 08/31/24  0739  --  less than 1                    Labs (abnormal labs have a star):   Labs Reviewed   COMPREHENSIVE METABOLIC PANEL - Abnormal; Notable for the following components:       Result Value    Glucose 131 (*)     All other components within normal limits    Narrative:     GFR Normal >60  Chronic Kidney Disease <60  Kidney Failure <15    The GFR formula is only valid for adults with stable renal function between ages 18 and 70.   SINGLE HS TROPONIN T - Abnormal; Notable for the following components:    HS Troponin T 22 (*)      All other components within normal limits    Narrative:     High Sensitive Troponin T Reference Range:  <14.0 ng/L- Negative Female for AMI  <22.0 ng/L- Negative Male for AMI  >=14 - Abnormal Female indicating possible myocardial injury.  >=22 - Abnormal Male indicating possible myocardial injury.   Clinicians would have to utilize clinical acumen, EKG, Troponin, and serial changes to determine if it is an Acute Myocardial Infarction or myocardial injury due to an underlying chronic condition.        CBC WITH AUTO DIFFERENTIAL - Abnormal; Notable for the following components:    WBC 11.00 (*)     MCV 97.1 (*)     RDW 11.9 (*)     Lymphocyte % 16.3 (*)     Immature Grans % 0.6 (*)     Neutrophils, Absolute 8.16 (*)     Immature Grans, Absolute 0.07 (*)     All other components within normal limits   URINALYSIS W/ CULTURE IF INDICATED - Abnormal; Notable for the following components:    Appearance, UA Turbid (*)     Blood, UA Small (1+) (*)     Protein, UA Trace (*)     Leuk Esterase, UA Large (3+) (*)     Nitrite, UA Positive (*)     All other components within normal limits    Narrative:     In absence of clinical symptoms, the presence of pyuria, bacteria, and/or nitrites on the urinalysis result does not correlate with infection.   URINALYSIS, MICROSCOPIC ONLY - Abnormal; Notable for the following components:    RBC, UA 3-5 (*)     WBC, UA Too Numerous to Count (*)     Bacteria, UA 4+ (*)     All other components within normal limits   RESPIRATORY PANEL PCR W/ COVID-19 (SARS-COV-2), NP SWAB IN UTM/VTP, 2 HR TAT - Normal    Narrative:     In the setting of a positive respiratory panel with a viral infection PLUS a negative procalcitonin without other underlying concern for bacterial infection, consider observing off antibiotics or discontinuation of antibiotics and continue supportive care. If the respiratory panel is positive for atypical bacterial infection (Bordetella pertussis, Chlamydophila pneumoniae, or  "Mycoplasma pneumoniae), consider antibiotic de-escalation to target atypical bacterial infection.   BNP (IN-HOUSE) - Normal    Narrative:     This assay is used as an aid in the diagnosis of individuals suspected of having heart failure. It can be used as an aid in the diagnosis of acute decompensated heart failure (ADHF) in patients presenting with signs and symptoms of ADHF to the emergency department (ED). In addition, NT-proBNP of <300 pg/mL indicates ADHF is not likely.    Age Range Result Interpretation  NT-proBNP Concentration (pg/mL:      <50             Positive            >450                   Gray                 300-450                    Negative             <300    50-75           Positive            >900                  Gray                300-900                  Negative            <300      >75             Positive            >1800                  Gray                300-1800                  Negative            <300   LACTIC ACID, PLASMA - Normal   PROCALCITONIN - Normal    Narrative:     As a Marker for Sepsis (Non-Neonates):    1. <0.5 ng/mL represents a low risk of severe sepsis and/or septic shock.  2. >2 ng/mL represents a high risk of severe sepsis and/or septic shock.    As a Marker for Lower Respiratory Tract Infections that require antibiotic therapy:    PCT on Admission    Antibiotic Therapy       6-12 Hrs later    >0.5                Strongly Recommended  >0.25 - <0.5        Recommended   0.1 - 0.25          Discouraged              Remeasure/reassess PCT  <0.1                Strongly Discouraged     Remeasure/reassess PCT    As 28 day mortality risk marker: \"Change in Procalcitonin Result\" (>80% or <=80%) if Day 0 (or Day 1) and Day 4 values are available. Refer to http://www.[facility]-pct-calculator.com    Change in PCT <=80%  A decrease of PCT levels below or equal to 80% defines a positive change in PCT test result representing a higher risk for 28-day all-cause mortality of " patients diagnosed with severe sepsis for septic shock.    Change in PCT >80%  A decrease of PCT levels of more than 80% defines a negative change in PCT result representing a lower risk for 28-day all-cause mortality of patients diagnosed with severe sepsis or septic shock.      URINE CULTURE   CBC AND DIFFERENTIAL    Narrative:     The following orders were created for panel order CBC & Differential.  Procedure                               Abnormality         Status                     ---------                               -----------         ------                     CBC Auto Differential[[id number]]        Abnormal            Final result                 Please view results for these tests on the individual orders.       EKG:   No orders to display       Meds given in ED:   Medications   sodium chloride 0.9 % flush 10 mL (has no administration in time range)   cefTRIAXone (ROCEPHIN) 1,000 mg in sodium chloride 0.9 % 100 mL MBP (1,000 mg Intravenous New Bag 8/31/24 0933)   cefTRIAXone (ROCEPHIN) 1,000 mg in sodium chloride 0.9 % 100 mL MBP (has no administration in time range)   sodium chloride 0.9 % flush 10 mL (has no administration in time range)   sodium chloride 0.9 % flush 10 mL (has no administration in time range)   sodium chloride 0.9 % infusion 40 mL (has no administration in time range)   Potassium Replacement - Follow Nurse / BPA Driven Protocol (has no administration in time range)   Magnesium Standard Dose Replacement - Follow Nurse / BPA Driven Protocol (has no administration in time range)   Phosphorus Replacement - Follow Nurse / BPA Driven Protocol (has no administration in time range)   Calcium Replacement - Follow Nurse / BPA Driven Protocol (has no administration in time range)   acetaminophen (TYLENOL) tablet 650 mg (has no administration in time range)     Or   acetaminophen (TYLENOL) 160 MG/5ML oral solution 650 mg (has no administration in time range)     Or   acetaminophen (TYLENOL)  suppository 650 mg (has no administration in time range)   sennosides-docusate (PERICOLACE) 8.6-50 MG per tablet 2 tablet (has no administration in time range)     And   polyethylene glycol (MIRALAX) packet 17 g (has no administration in time range)     And   bisacodyl (DULCOLAX) EC tablet 5 mg (has no administration in time range)     And   bisacodyl (DULCOLAX) suppository 10 mg (has no administration in time range)   acetaminophen (TYLENOL) tablet 500 mg (500 mg Oral Given 8/31/24 0841)   metoprolol tartrate (LOPRESSOR) tablet 25 mg (25 mg Oral Given 8/31/24 0933)       Imaging results:  CT Head Without Contrast    Result Date: 8/31/2024  Extensive chronic small vessel ischemic white matter change and atrophy. No evidence for acute intracranial abnormality.  This report was finalized on 8/31/2024 8:26 AM by Isrrael Barnett M.D on Workstation: [id number]      XR Chest 1 View    Result Date: 8/31/2024  Low lung volumes without opacities identified.  This report was finalized on 8/31/2024 8:19 AM by Dr. Marco Antonio Thornton M.D on Workstation: [id number]       Ambulatory status:   - assist     Social issues:   Social History     Socioeconomic History    Marital status: Other   Tobacco Use    Smoking status: Never    Smokeless tobacco: Never   Vaping Use    Vaping status: Never Used   Substance and Sexual Activity    Alcohol use: Never    Drug use: Never    Sexual activity: Not Currently       Peripheral Neurovascular  Peripheral Neurovascular (Adult)  Peripheral Neurovascular WDL: WDL    Neuro Cognitive  Neuro Cognitive (Adult)  Cognitive/Neuro/Behavioral WDL: .WDL except (patient has dementia)  [place]ee Coma Scale  Best Eye Response: 4-->(E4) spontaneous  Best Motor Response: 6-->(M6) obeys commands  Best Verbal Response: 5-->(V5) oriented  [place] Coma Scale Score: 15    Learning  Learning Assessment (Adult)  Learning Readiness and Ability: cognitive limitation noted  Education Provided  Person Taught:  patient    Respiratory  Respiratory WDL  Respiratory WDL: WDL (patient denies any trouble breathing, denies cough)    Abdominal Pain       Pain Assessments  Pain (Adult)  (0-10) Pain Rating: Rest: 7  (0-10) Pain Rating: Activity: 7  Pain Location: head    NIH Stroke Scale       Antonietta Mcnulty RN  08/31/24 09:34 EDT

## 2024-08-31 NOTE — THERAPY EVALUATION
Patient Name: Marianna Terrell  : 1931    MRN: [id number]                              Today's Date: 2024       Admit Date: 2024    Visit Dx:     ICD-10-CM ICD-9-CM   1. Urinary tract infection without hematuria, site unspecified  N39.0 599.0   2. Nonintractable headache, unspecified chronicity pattern, unspecified headache type  R51.9 784.0   3. Hypertension not at goal  I10 401.9     Patient Active Problem List   Diagnosis    UTI (urinary tract infection)    Dementia    Hypothyroidism (acquired)    CKD (chronic kidney disease)    Fall    Hyperlipidemia     Past Medical History:   Diagnosis Date    Anxiety     Chronic kidney disease (CKD), stage IV (severe)     Dementia     Difficulty walking     High cholesterol     HL (hearing loss)     Hypothyroid      Past Surgical History:   Procedure Laterality Date    CYSTECTOMY      HYSTERECTOMY      KNEE SURGERY        General Information       Row Name 24 1430          Physical Therapy Time and Intention    Document Type evaluation  -ST     Mode of Treatment physical therapy  -       Row Name 24 1430          General Information    Patient Profile Reviewed yes  -ST     Prior Level of Function independent:;min assist:  pt from [facility], reports indep with rwx room distances or transfers. min A for ADL  -ST     Existing Precautions/Restrictions fall  -ST       Row Name 24 1430          Living Environment    People in Home facility resident  -ST       Row Name 24 1430          Home Main Entrance    Number of Stairs, Main Entrance none  -ST       Row Name 24 1430          Stairs Within Home, Primary    Number of Stairs, Within Home, Primary none  -ST       Row Name 24 1430          Cognition    Orientation Status (Cognition) oriented x 3  -ST       Row Name 24 1430          Safety Issues, Functional Mobility    Impairments Affecting Function (Mobility) balance;endurance/activity tolerance;postural/trunk  control;pain  -ST     Comment, Safety Issues/Impairments (Mobility) gait belt, nonskid socks donned  -               User Key  (r) = Recorded By, (t) = Taken By, (c) = Cosigned By      Initials Name Provider Type    Sydni Bird, LAURIE Physical Therapist                   Mobility       Row Name 08/31/24 1430          Bed Mobility    Bed Mobility supine-sit;sit-supine  -ST     Supine-Sit [place] (Bed Mobility) moderate assist (50% patient effort)  -ST     Sit-Supine [place] (Bed Mobility) moderate assist (50% patient effort)  -ST     Assistive Device (Bed Mobility) bed rails  -       Row Name 08/31/24 1430          Sit-Stand Transfer    Sit-Stand [place] (Transfers) moderate assist (50% patient effort);maximum assist (25% patient effort);1 person assist  -     Assistive Device (Sit-Stand Transfers) walker, front-wheeled  -ST     Comment, (Sit-Stand Transfer) reaches full stand x 1, attempt 2 more times, signficant posterior lean/pushing  -       Row Name 08/31/24 1430          Gait/Stairs (Locomotion)    [place] Level (Gait) not tested  -     Comment, (Gait/Stairs) not safe to trial ambulation at this time  -               User Key  (r) = Recorded By, (t) = Taken By, (c) = Cosigned By      Initials Name Provider Type    Sydni Bird, PT Physical Therapist                   Obj/Interventions       Row Name 08/31/24 1431          Range of Motion Comprehensive    Comment, General Range of Motion bilat LE WFL  -       Row Name 08/31/24 1431          Strength Comprehensive (MMT)    Comment, General Manual Muscle Testing (MMT) Assessment bilat LE [facility] Name 08/31/24 1431          Balance    Comment, Balance CGA for unsupported sitting, max A for standing balance today with posterior pushing  -ST               User Key  (r) = Recorded By, (t) = Taken By, (c) = Cosigned By      Initials Name Provider Type    Sydni Bird, PT Physical Therapist                    Goals/Plan       Row Name 08/31/24 1432          Bed Mobility Goal 1 (PT)    Activity/Assistive Device (Bed Mobility Goal 1, PT) bed mobility activities, all  -ST     [place] Level/Cues Needed (Bed Mobility Goal 1, PT) standby assist  -ST     Time Frame (Bed Mobility Goal 1, PT) 1 week  -ST     Progress/Outcomes (Bed Mobility Goal 1, PT) new goal  -ST       Row Name 08/31/24 1432          Transfer Goal 1 (PT)    Activity/Assistive Device (Transfer Goal 1, PT) sit-to-stand/stand-to-sit;bed-to-chair/chair-to-bed  -ST     [place] Level/Cues Needed (Transfer Goal 1, PT) supervision required  -ST     Time Frame (Transfer Goal 1, PT) 1 week  -ST     Progress/Outcome (Transfer Goal 1, PT) new goal  -ST       Row Name 08/31/24 1432          Gait Training Goal 1 (PT)    Activity/Assistive Device (Gait Training Goal 1, PT) gait (walking locomotion);assistive device use  -ST     [place] Level (Gait Training Goal 1, PT) standby assist  -ST     Distance (Gait Training Goal 1, PT) 50'  -ST     Time Frame (Gait Training Goal 1, PT) 1 week  -ST     Progress/Outcome (Gait Training Goal 1, PT) new goal  -ST               User Key  (r) = Recorded By, (t) = Taken By, (c) = Cosigned By      Initials Name Provider Type    ST Sydni Armendariz, PT Physical Therapist                   Clinical Impression       Row Name 08/31/24 1431          Pain    Pain Location - head  -ST     Pre/Posttreatment Pain Comment reports throbbing headache but does not rate  -ST     Pain Intervention(s) Repositioned;Nursing Notified  -       Row Name 08/31/24 1431          Plan of Care Review    Plan of Care Reviewed With patient  -ST     Outcome Evaluation Pt is 89 y/o F admitted to [facility] on 8/31/24 with headache, fever, and noted to have possible UTI. At baseline pt is from [facility], states she walks with a walker indep in her room, has assist with bathing and dressing. Pt currently demos mod A for bed mobility and max A with rwx to  stand. Only able to stand x 1 despite 3 attempts. Unable to progress to ambulation today. Reports throbbing headache throughout, RN updated. Pt demos mobility below baseline and therefore would benefit from acute skilled PT to address functional mobility deficits. Anticipate [facility] SN[facility] vs [facility] pending progress.  -ST       Row Name 08/31/24 1431          Therapy Assessment/Plan (PT)    Rehab Potential (PT) good, to achieve stated therapy goals  -ST     Criteria for Skilled Interventions Met (PT) yes  -ST     Therapy Frequency (PT) 5 times/wk  -ST     Predicted Duration of Therapy Intervention (PT) 1 week  -ST       Row Name 08/31/24 1431          Positioning and Restraints    Pre-Treatment Position in bed  -ST     Post Treatment Position bed  -ST     In Bed notified nsg;supine;call light within reach;encouraged to call for assist;exit alarm on  -ST               User Key  (r) = Recorded By, (t) = Taken By, (c) = Cosigned By      Initials Name Provider Type    Sydni Bird, LAURIE Physical Therapist                   Outcome Measures       Row Name 08/31/24 1432 08/31/24 1047       How much help from another person do you currently need...    Turning from your back to your side while in flat bed without using bedrails? 2  -ST 3  -DM    Moving from lying on back to sitting on the side of a flat bed without bedrails? 2  -ST 3  -DM    Moving to and from a bed to a chair (including a wheelchair)? 1  -ST 2  -DM    Standing up from a chair using your arms (e.g., wheelchair, bedside chair)? 2  -ST 2  -DM    Climbing 3-5 steps with a railing? 1  -ST 2  -DM    To walk in hospital room? 1  -ST 2  -DM    AM-PAC 6 Clicks Score (PT) 9  -ST 14  -DM    Highest Level of Mobility Goal 3 --> Sit at edge of bed  -ST 4 --> Transfer to chair/commode  -DM      Row Name 08/31/24 1432          Functional Assessment    Outcome Measure Options AM-PAC 6 Clicks Basic Mobility (PT)  -ST               User Key  (r) = Recorded By, (t) = Taken  By, (c) = Cosigned By      Initials Name Provider Type    Santos Thrasher, RN Registered Nurse     Sydni Armendariz PT Physical Therapist                                 Physical Therapy Education       Title: PT OT SLP Therapies (In Progress)       Topic: Physical Therapy (In Progress)       Point: Mobility training (Done)       Learning Progress Summary             Patient Acceptance, E,TB, VU,NR by ST at 8/31/2024 1433                         Point: Home exercise program (Not Started)       Learner Progress:  Not documented in this visit.              Point: Body mechanics (Not Started)       Learner Progress:  Not documented in this visit.              Point: Precautions (Not Started)       Learner Progress:  Not documented in this visit.                              User Key       Initials Effective Dates Name Provider Type Discipline     09/22/22 -  Sydni Armendariz PT Physical Therapist PT                  PT Recommendation and Plan     Plan of Care Reviewed With: patient  Outcome Evaluation: Pt is 89 y/o F admitted to [facility] on 8/31/24 with headache, fever, and noted to have possible UTI. At baseline pt is from [facility], states she walks with a walker indep in her room, has assist with bathing and dressing. Pt currently demos mod A for bed mobility and max A with rwx to stand. Only able to stand x 1 despite 3 attempts. Unable to progress to ambulation today. Reports throbbing headache throughout, RN updated. Pt demos mobility below baseline and therefore would benefit from acute skilled PT to address functional mobility deficits. Anticipate d/c to [facility] vs [facility] pending progress.     Time Calculation:         PT Charges       Row Name 08/31/24 1434             Time Calculation    Start Time 1400  -ST      Stop Time 1415  -ST      Time Calculation (min) 15 min  -ST      PT Received On 08/31/24  -ST      PT - Next Appointment 09/02/24  -ST      PT Goal Re-Cert Due Date 09/06/24  -ST         Time  Calculation- PT    Total Timed Code Minutes- PT 0 minute(s)  -ST                User Key  (r) = Recorded By, (t) = Taken By, (c) = Cosigned By      Initials Name Provider Type    Sydni Bird PT Physical Therapist                  Therapy Charges for Today       Code Description Service Date Service Provider Modifiers Qty    [id number] HC PT EVAL MOD COMPLEXITY 2 8/31/2024 Sydni Armendariz PT GP 1            PT G-Codes  Outcome Measure Options: AM-PAC 6 Clicks Basic Mobility (PT)  AM-PAC 6 Clicks Score (PT): 9  PT Discharge Summary  Anticipated Discharge Disposition (PT): skilled nursing facility, assisted living (pending progress)    Sydni Armendariz, LAURIE  8/31/2024

## 2024-08-31 NOTE — ED NOTES
Pt to ED from assisted living [facility] (facility did not send paperwork) due to HA x 2 days and intermittent fever. Pt does not ambulate independently and is a falls risk. Pt also had urine on her pants and EMS reports a strong urine odor.     Pt was 88 percent on room air. EMS placed 3 L NC and pt is now at 96 percent. Pt does not require oxygen at baseline.     EMS stated facility did not call EMS but possible another worker at the facility, dietary or a friend. Facility did not give report to EMS.

## 2024-08-31 NOTE — H&P
[facility]   HISTORY AND PHYSICAL    Patient Name: Marianna Terrell  : 1931  MRN: [id number]  Primary Care Physician:  Provider, No Known  Date of admission: 2024    Subjective   Subjective     Chief Complaint:   Chief Complaint   Patient presents with    Headache    Fever         HPI:    Marianna Terrell is a 89 y.o. female who presented to the emergency department with complaints of acute headache x 2 days.  She lives at an assisted living facility.  She had a fall last week and was evaluated in the emergency department 10 days ago and had a head CT at that time which was unremarkable.  She has apparently also had intermittent fevers.  Temperature was 99 in the emergency department.  She denies any chest pain, dyspnea, abdominal pain, nausea, vomiting or diarrhea.  Past medical history significant for but not limited to dementia, hypothyroidism, hyperlipidemia.  Initial troponin was 22, this appears to be close to her baseline.  Chemistry largely unremarkable, glucose slightly elevated at 131.  RPP negative.  Her white blood cell count was 11.  Urinalysis was positive for nitrites and leukocytes with TNTC WBCs, 4+ bacteria.  Urine culture is pending this time.  Chest x-ray shows low lung volumes without any evidence of pneumonia.  She had a head CT which shows extensive chronic small vessel ischemia, no evidence for acute intracranial abnormality.  Her blood pressure was significantly elevated in emergency department.  She will be admitted to the ED observation unit for further management.    Review of Systems   All systems were reviewed and negative except for: those mentioned in HPI    Personal History     Past Medical History:   Diagnosis Date    Anxiety     Chronic kidney disease (CKD), stage IV (severe)     Dementia     Difficulty walking     High cholesterol     HL (hearing loss)     Hypothyroid        Past Surgical History:   Procedure Laterality Date    CYSTECTOMY      HYSTERECTOMY       KNEE SURGERY         Family History: family history includes Breast cancer in her paternal aunt. Otherwise pertinent FHx was reviewed and not pertinent to current issue.    Social History:  reports that she has never smoked. She has never used smokeless tobacco. She reports that she does not drink alcohol and does not use drugs.    Home Medications:  aspirin, calcium citrate-vitamin d, escitalopram, fish oil, levothyroxine, metoprolol tartrate, multivitamin, oxybutynin, sennosides-docusate, simvastatin, and vitamin E    Allergies:  No Known Allergies    Objective   Objective     Vitals:   Temp:  [98.1 °F (36.7 °C)-99 °F (37.2 °C)] 98.1 °F (36.7 °C)  Heart Rate:  [62-91] 62  Resp:  [21-26] 21  BP: (154-195)/(65-92) 195/73  Physical Exam    Constitutional: Awake, alert   Eyes: PERRLA, sclerae anicteric, no conjunctival injection   HENT: NCAT, mucous membranes moist   Neck: Supple, no thyromegaly, no lymphadenopathy, trachea midline   Respiratory: Clear to auscultation bilaterally, nonlabored respirations    Cardiovascular: RRR, + murmur, rubs, or gallops, palpable pedal pulses bilaterally   Gastrointestinal: Positive bowel sounds, soft, nontender, nondistended   Musculoskeletal: No bilateral ankle edema, no clubbing or cyanosis to extremities   Psychiatric: Appropriate affect, cooperative   Neurologic: Oriented x 2, strength symmetric in all extremities, Cranial Nerves grossly intact to confrontation, speech clear   Skin: No rashes     Result Review    Result Review:  I have personally reviewed the results from the time of this admission to 8/31/2024 10:57 EDT and agree with these findings:  [x]  Laboratory list / accordion  []  Microbiology  []  Radiology  []  EKG/Telemetry   []  Cardiology/Vascular   []  Pathology  []  Old records  []  Other:  Most notable findings include: Troponin 22, glucose 131, WBC 11      Assessment & Plan   Assessment / Plan     Brief Patient Summary:  Marianna Terrell is a 89 y.o. female who  will be admitted to the ED observation unit for further management due to urinary tract infection, headache.  Head CT negative for acute abnormality.  She received Rocephin in the emergency department.  Plan to continue IV antibiotics and repeat labs in the morning.    Active Hospital Problems:  Active Hospital Problems    Diagnosis     **UTI (urinary tract infection)      Plan:     Urinary tract infection  -white blood cell count was 11  -Urinalysis was positive for nitrites and leukocytes with TNTC WBCs, 4+ bacteria.  -Urine culture is pending this time  -Continue Rocephin  -Repeat CBC and BMP in the morning    Headache  -Head CT negative for acute abnormality  -Blood pressure significantly elevated  -Check ESR    Uncontrolled hypertension  -Resume home medications once verified with assisted living facility  -Received metoprolol in the emergency department  -Vital signs every 4 hours  -Check TSH    VTE Prophylaxis:  Mechanical VTE prophylaxis orders are present.    CODE STATUS:    Level Of Support Discussed With: Patient  Code Status (Patient has no pulse and is not breathing): CPR (Attempt to Resuscitate)  Medical Interventions (Patient has pulse or is breathing): Full Support    Admission Status:  I believe this patient meets observation status.    Electronically signed by SONDRA Pham, 08/31/24, 10:57 AM EDT.    75 minutes has been spent by [facility] providers in the care of this patient while under observation status    I have worn appropriate PPE during this patient encounter, sanitized my hands both with entering and exiting patient's room.    I have discussed plan of care with patient including advance care plan and/or surrogate decision maker.  Patient advises that their son, Pineda will be their primary surrogate decision maker

## 2024-09-01 LAB
ANION GAP SERPL CALCULATED.3IONS-SCNC: 9.9 MMOL/L (ref 5–15)
BASOPHILS # BLD AUTO: 0.04 10*3/MM3 (ref 0–0.2)
BASOPHILS NFR BLD AUTO: 0.4 % (ref 0–1.5)
BUN SERPL-MCNC: 13 MG/DL (ref 8–23)
BUN/CREAT SERPL: 21.3 (ref 7–25)
CALCIUM SPEC-SCNC: 8.7 MG/DL (ref 8.2–9.6)
CHLORIDE SERPL-SCNC: 100 MMOL/L (ref 98–107)
CO2 SERPL-SCNC: 24.1 MMOL/L (ref 22–29)
CREAT SERPL-MCNC: 0.61 MG/DL (ref 0.57–1)
DEPRECATED RDW RBC AUTO: 43.6 FL (ref 37–54)
EGFRCR SERPLBLD CKD-EPI 2021: 83.5 ML/MIN/1.73
EOSINOPHIL # BLD AUTO: 0.28 10*3/MM3 (ref 0–0.4)
EOSINOPHIL NFR BLD AUTO: 2.8 % (ref 0.3–6.2)
ERYTHROCYTE [DISTWIDTH] IN BLOOD BY AUTOMATED COUNT: 12.2 % (ref 12.3–15.4)
GLUCOSE SERPL-MCNC: 108 MG/DL (ref 65–99)
HCT VFR BLD AUTO: 37.3 % (ref 34–46.6)
HGB BLD-MCNC: 12.3 G/DL (ref 12–15.9)
IMM GRANULOCYTES # BLD AUTO: 0.05 10*3/MM3 (ref 0–0.05)
IMM GRANULOCYTES NFR BLD AUTO: 0.5 % (ref 0–0.5)
LYMPHOCYTES # BLD AUTO: 1.95 10*3/MM3 (ref 0.7–3.1)
LYMPHOCYTES NFR BLD AUTO: 19.2 % (ref 19.6–45.3)
MCH RBC QN AUTO: 32.2 PG (ref 26.6–33)
MCHC RBC AUTO-ENTMCNC: 33 G/DL (ref 31.5–35.7)
MCV RBC AUTO: 97.6 FL (ref 79–97)
MONOCYTES # BLD AUTO: 0.9 10*3/MM3 (ref 0.1–0.9)
MONOCYTES NFR BLD AUTO: 8.9 % (ref 5–12)
NEUTROPHILS NFR BLD AUTO: 6.94 10*3/MM3 (ref 1.7–7)
NEUTROPHILS NFR BLD AUTO: 68.2 % (ref 42.7–76)
NRBC BLD AUTO-RTO: 0 /100 WBC (ref 0–0.2)
PLATELET # BLD AUTO: 214 10*3/MM3 (ref 140–450)
PMV BLD AUTO: 9.6 FL (ref 6–12)
POTASSIUM SERPL-SCNC: 3.9 MMOL/L (ref 3.5–5.2)
RBC # BLD AUTO: 3.82 10*6/MM3 (ref 3.77–5.28)
SODIUM SERPL-SCNC: 134 MMOL/L (ref 136–145)
WBC NRBC COR # BLD AUTO: 10.16 10*3/MM3 (ref 3.4–10.8)

## 2024-09-01 PROCEDURE — 80048 BASIC METABOLIC PNL TOTAL CA: CPT

## 2024-09-01 PROCEDURE — 94761 N-INVAS EAR/PLS OXIMETRY MLT: CPT

## 2024-09-01 PROCEDURE — 85025 COMPLETE CBC W/AUTO DIFF WBC: CPT

## 2024-09-01 PROCEDURE — G0378 HOSPITAL OBSERVATION PER HR: HCPCS

## 2024-09-01 PROCEDURE — 25010000002 CEFTRIAXONE PER 250 MG

## 2024-09-01 PROCEDURE — 99213 OFFICE O/P EST LOW 20 MIN: CPT | Performed by: PSYCHIATRY & NEUROLOGY

## 2024-09-01 PROCEDURE — 94799 UNLISTED PULMONARY SVC/PX: CPT

## 2024-09-01 PROCEDURE — 94640 AIRWAY INHALATION TREATMENT: CPT

## 2024-09-01 RX ORDER — METOPROLOL TARTRATE 25 MG/1
12.5 TABLET, FILM COATED ORAL EVERY 12 HOURS SCHEDULED
Qty: 30 TABLET | Refills: 0 | Status: SHIPPED | OUTPATIENT
Start: 2024-09-01 | End: 2024-09-01

## 2024-09-01 RX ORDER — METOPROLOL TARTRATE 25 MG/1
12.5 TABLET, FILM COATED ORAL EVERY 12 HOURS SCHEDULED
Qty: 30 TABLET | Refills: 0 | Status: SHIPPED | OUTPATIENT
Start: 2024-09-01

## 2024-09-01 RX ORDER — CEFPODOXIME PROXETIL 100 MG/1
100 TABLET, FILM COATED ORAL EVERY 12 HOURS
Qty: 6 TABLET | Refills: 0 | Status: SHIPPED | OUTPATIENT
Start: 2024-09-01 | End: 2024-09-01

## 2024-09-01 RX ORDER — METOPROLOL TARTRATE 25 MG/1
12.5 TABLET, FILM COATED ORAL EVERY 12 HOURS SCHEDULED
Status: DISCONTINUED | OUTPATIENT
Start: 2024-09-01 | End: 2024-09-05 | Stop reason: HOSPADM

## 2024-09-01 RX ORDER — CEFPODOXIME PROXETIL 100 MG/1
100 TABLET, FILM COATED ORAL EVERY 12 HOURS
Qty: 6 TABLET | Refills: 0 | Status: SHIPPED | OUTPATIENT
Start: 2024-09-01 | End: 2024-09-05 | Stop reason: HOSPADM

## 2024-09-01 RX ORDER — LOSARTAN POTASSIUM 25 MG/1
25 TABLET ORAL
Status: DISCONTINUED | OUTPATIENT
Start: 2024-09-01 | End: 2024-09-05 | Stop reason: HOSPADM

## 2024-09-01 RX ORDER — LOSARTAN POTASSIUM 25 MG/1
25 TABLET ORAL
Qty: 30 TABLET | Refills: 0 | Status: SHIPPED | OUTPATIENT
Start: 2024-09-02

## 2024-09-01 RX ORDER — LOSARTAN POTASSIUM 25 MG/1
25 TABLET ORAL DAILY
Qty: 30 TABLET | Refills: 0 | Status: SHIPPED | OUTPATIENT
Start: 2024-09-01 | End: 2024-09-01

## 2024-09-01 RX ORDER — METOPROLOL TARTRATE 25 MG/1
25 TABLET, FILM COATED ORAL ONCE
Status: COMPLETED | OUTPATIENT
Start: 2024-09-01 | End: 2024-09-01

## 2024-09-01 RX ADMIN — Medication 10 ML: at 20:06

## 2024-09-01 RX ADMIN — LEVOTHYROXINE SODIUM 75 MCG: 75 TABLET ORAL at 08:56

## 2024-09-01 RX ADMIN — METOPROLOL TARTRATE 12.5 MG: 25 TABLET, FILM COATED ORAL at 20:04

## 2024-09-01 RX ADMIN — ATORVASTATIN CALCIUM 10 MG: 20 TABLET, FILM COATED ORAL at 08:56

## 2024-09-01 RX ADMIN — LOSARTAN POTASSIUM 25 MG: 25 TABLET, FILM COATED ORAL at 08:56

## 2024-09-01 RX ADMIN — ESCITALOPRAM 20 MG: 20 TABLET, FILM COATED ORAL at 20:04

## 2024-09-01 RX ADMIN — Medication 10 ML: at 09:07

## 2024-09-01 RX ADMIN — ALBUTEROL SULFATE 2.5 MG: 2.5 SOLUTION RESPIRATORY (INHALATION) at 08:46

## 2024-09-01 RX ADMIN — METOPROLOL TARTRATE 25 MG: 25 TABLET, FILM COATED ORAL at 08:56

## 2024-09-01 RX ADMIN — CEFTRIAXONE SODIUM 1000 MG: 1 INJECTION, POWDER, FOR SOLUTION INTRAMUSCULAR; INTRAVENOUS at 00:05

## 2024-09-01 NOTE — CASE MANAGEMENT/SOCIAL WORK
Discharge Planning Assessment  [facility]     Patient Name: Marianna Terrell  MRN: [id number]  Today's Date: 9/1/2024    Admit Date: 8/31/2024    Plan: Plans to return to AL facility at d/Mulugeta Ware RN   Discharge Needs Assessment    No documentation.                  Discharge Plan       Row Name 09/01/24 1341       Plan    Plan Comments Contacted clinical liaison for [facility] and spoke with Willa Davis, who informs that patient was previously current with [facility] for physical therapy. I have placed a referral for [facility] in liaison in-basket and will request provider place an order for home PT. KEDAR Carrasco RN      Row Name 09/01/24 6226       Plan    Plan Comments Received call from [facility] provider informing that PT evaluation with recommendations for skilled nursing facility vs return to [facility]. I contacted patient's son, Pineda Terrell, and spoke with him regarding options.  Explained to Mr. Terrell that patient does not have a qualifying inpatient stay for rehab placement under Medicare. I discussed private pay for rehab, but this is not an option.  Patient's son is agreeable for patient to discharge back to [facility] with potential for added physical therapy on an outpatient basis at her facility. Provided updated. KEDAR Carrasco RN                  Continued Care and Services - Admitted Since 8/31/2024       Home Medical Care       Service Provider Request Status Selected Services Address Phone Fax Patient Preferred    [facility] Pending - Request Sent N[address], [facility] [address] [phone]                   Selected Continued Care - Prior Encounters Includes continued care and service providers with selected services from prior encounters from 6/2/2024 to 9/1/2024      Discharged on 7/3/2024 Admission date: 6/28/2024 - Discharge disposition: Skilled Nursing Facility (DC - External)      Destination       Service  Provider Selected Services Address Phone Fax Patient Preferred    [facility] [address] [facility] [address] [phone] --                             Demographic Summary    No documentation.                  Functional Status    No documentation.                  Psychosocial    No documentation.                  Abuse/Neglect    No documentation.                  Legal    No documentation.                  Substance Abuse    No documentation.                  Patient Forms    No documentation.                     Marco Antonio Dobbs RN

## 2024-09-01 NOTE — PROGRESS NOTES
ED OBSERVATION PROGRESS/DISCHARGE SUMMARY     Date of Admission: 8/31/2024   LOS: 0 days   PCP: Provider, No Known     Subjective: Specific complaints.  Headache is improved today.     Hospital Outcome:      Ms. Terrell was admitted to the ED observation unit for further management with a diagnosis of UTI and headache.  Initial workup in the emergency department shows a high-sensitivity troponin of 22, glucose 131, WBCs 11.0, urinalysis shows 1+ blood, nitrate positive, 3+ leukocytes, trace of protein, 3-5 WBCs, too numerous to count WBCs, and 4+ bacteria.  Urine culture is pending.  Respiratory viral panel PCR is negative.  Chest x-ray shows low lung volumes without opacities identified.  CT head without contrast shows extensive chronic small vessel ischemic white matter change and atrophy.     9/1/24  Neurology consulted and has evaluated the patient.  They felt her headache was likely due to hypertensive urgency.  Headache has resolved with treatment of hypertension.  No further workup indicated from their standpoint.  Metoprolol adjusted to 12.5 twice daily and we added losartan to her regimen.  Physical therapy evaluated the patient and advised skilled nursing versus returning to assisted living facility.  She is unable to stand without assistance.  Spoke with patient's son, we will keep the patient overnight.  I will speak with the hospitalist service about admitting the patient for further management.    ROS:  General: no fevers, chills, + generalized weakness  Respiratory: no cough, dyspnea  Cardiovascular: no chest pain, palpitations  Abdomen: No abdominal pain, nausea, vomiting, or diarrhea  Neurologic: No focal weakness     Objective  Physical Exam:  I have reviewed the vital signs.  Temp:  [97 °F (36.1 °C)-98.9 °F (37.2 °C)] 98.9 °F (37.2 °C)  Heart Rate:  [52-70] 65  Resp:  [18-20] 20  BP: (132-185)/(50-77) 135/54  General Appearance:    Alert, cooperative, no distress  Head:     Normocephalic,  atraumatic  Eyes:     Sclerae anicteric  Neck:     Supple, no mass  Lungs: Clear to auscultation bilaterally, respirations unlabored  Heart: Regular rate and rhythm, S1 and S2 normal, no murmur, rub or gallop  Abdomen:  Soft, nontender, bowel sounds active, nondistended  Extremities: No clubbing, cyanosis, or edema to lower extremities  Pulses:  2+ and symmetric in distal lower extremities  Skin: No rashes   Neurologic: Oriented x3, Normal strength to extremities     Results Review:    I have reviewed the labs, radiology results and diagnostic studies.          Results from last 7 days   Lab Units 09/01/24  0326   WBC 10*3/mm3 10.16   HEMOGLOBIN g/dL 12.3   HEMATOCRIT % 37.3   PLATELETS 10*3/mm3 214            Results from last 7 days   Lab Units 09/01/24  0326 08/31/24  0745   SODIUM mmol/L 134* 137   POTASSIUM mmol/L 3.9 4.0   CHLORIDE mmol/L 100 102   CO2 mmol/L 24.1 25.0   BUN mg/dL 13 14   CREATININE mg/dL 0.61 0.59   CALCIUM mg/dL 8.7 9.1   BILIRUBIN mg/dL  --  1.0   ALK PHOS U/L  --  59   ALT (SGPT) U/L  --  11   AST (SGOT) U/L  --  9   GLUCOSE mg/dL 108* 131*      Imaging Results (Last 24 Hours)         ** No results found for the last 24 hours. **             I have reviewed the medications.  ---------------------------------------------------------------------------------------------     Assessment & Plan  Assessment/Problem List    UTI (urinary tract infection)     Plan:     Urinary tract infection  -Urinalysis was positive for nitrites and leukocytes with TNTC WBCs, 4+ bacteria.  -Urine culture pending  -Continue Rocephin     Headache  -Head CT negative for acute abnormality  -Blood pressure significantly elevated on arrival, headache improved with treatment  -ESR 34  -Neurology was consulted, no further workup needed from their standpoint     Uncontrolled hypertension  -Change metoprolol to 12.5 mg twice daily  -Added losartan 5 mg daily    Hypothyroidism  -TSH 2.28  -Continue levothyroxine      Disposition: Admit to hospitalist     This note will serve as a progress/transfer note     Sobeida Salazar, APRN 09/01/24 16:28 EDT     I have worn appropriate PPE during this patient encounter, sanitized my hands both with entering and exiting patient's room.     52 minutes has been spent by [facility] providers in the care of this patient while under observation status

## 2024-09-01 NOTE — PROGRESS NOTES
[facility] Hospital [facility]    [phone]   LOS: 0 days     Name: Marianna Terrell  Age/Sex: 89 y.o. female  :  1931        PCP: Provider, No Known  Chief Complaint   Patient presents with    Headache    Fever      Subjective   Only complains of weakness, she attributes this to not getting out of bed yesterday or today until PT worked with her.  Denies headache or weakness.  Denies urinary sympotoms or complaints prior to admission  General: No Fever or Chills, Cardiac: No Chest Pain or Palpitations, Resp: No Cough or SOA, GI: No Nausea, Vomiting, or Diarrhea, and Other: No bleeding    atorvastatin, 10 mg, Oral, Daily  cefTRIAXone, 1,000 mg, Intravenous, Q24H  escitalopram, 20 mg, Oral, Nightly  levothyroxine, 75 mcg, Oral, Daily  losartan, 25 mg, Oral, Q24H  metoprolol tartrate, 12.5 mg, Oral, Q12H  sodium chloride, 10 mL, Intravenous, Q12H           Objective   Vital Signs  Temp:  [97 °F (36.1 °C)-98.9 °F (37.2 °C)] 98.2 °F (36.8 °C)  Heart Rate:  [52-75] 75  Resp:  [18-22] 22  BP: (132-185)/(50-83) 140/83  Body mass index is 26.21 kg/m².    Intake/Output Summary (Last 24 hours) at 2024 1940  Last data filed at 2024 1825  Gross per 24 hour   Intake 120 ml   Output 1350 ml   Net -1230 ml       Physical Exam      Results Review:       I reviewed the patient's new clinical results.  Results from last 7 days   Lab Units 24  0326 24  0745   WBC 10*3/mm3 10.16 11.00*   HEMOGLOBIN g/dL 12.3 13.1   PLATELETS 10*3/mm3 214 217     Results from last 7 days   Lab Units 24  0326 24  0745   SODIUM mmol/L 134* 137   POTASSIUM mmol/L 3.9 4.0   CHLORIDE mmol/L 100 102   CO2 mmol/L 24.1 25.0   BUN mg/dL 13 14   CREATININE mg/dL 0.61 0.59   CALCIUM mg/dL 8.7 9.1   Estimated Creatinine Clearance: 55 mL/min (by C-G formula based on SCr of 0.61 mg/dL).  Results from last 7 days   Lab Units 24  0809   LEUKOCYTES UA  Large (3+)*   NITRITE UA  Positive*   WBC UA /HPF Too Numerous to Count*    BACTERIA UA /HPF 4+*   SQUAM EPITHEL UA /HPF 0-2   URINECX  >100,000 CFU/mL Gram Negative Bacilli*         Assessment & Plan   Active Hospital Problems    Diagnosis  POA    **UTI (urinary tract infection) [N39.0]  Yes    CKD (chronic kidney disease) [N18.9]  Yes    Dementia [F03.90]  Yes    Hyperlipidemia [E78.5]  Yes    Hypothyroidism (acquired) [E03.9]  Yes      Resolved Hospital Problems   No resolved problems to display.       PLAN  This is a 89-year-old lady with a history of hypertension hyperlipidemia mild dementia and chronic kidney disease that presents to the hospital initially with a headache and was found to have accelerated hypertension.  With control of her blood pressure her headache improved.  She is also diagnosed with a urinary tract infection on admission with noted white blood cells and bacteria in her urine.  She worked with physical therapy today and was weak and difficulty ambulating so she is being admitted to our service to further work with physical therapy to return home  -Not entirely sure this is urinary tract infection.  This is probably asymptomatic bacteriuria.  She had no urinary symptoms or complaints.  -Will go ahead and let her finish the last dose antibiotics in 4 hours but would not treat any further.  -Renal function electrolytes are stable  -Blood pressure stable today  -TSH at baseline  -PT and OT will evaluate  -DNR    Disposition  Expected Discharge Date: 9/1/2024; Expected Discharge Time:        Boogie Robles MD  [facility]  09/01/24  19:40 EDT

## 2024-09-01 NOTE — CASE MANAGEMENT/SOCIAL WORK
Discharge Planning Assessment  [facility]     Patient Name: Marianna Terrell  MRN: [id number]  Today's Date: 9/1/2024    Admit Date: 8/31/2024    Plan: Plans to return to AL facility at noni/Mulugeta Ware RN   Discharge Needs Assessment    No documentation.                  Discharge Plan       Row Name 09/01/24 1334       Plan    Plan Comments Received call from [facility] provider informing that PT evaluation with recommendations for skilled nursing facility vs return to [facility]. I contacted patient's son, Pineda Terrell, and spoke with him regarding options.  Explained to Mr. Terrell that patient does not have a qualifying inpatient stay for rehab placement under Medicare. I discussed private pay for rehab, but this is not an option.  Patient's son is agreeable for patient to discharge back to [facility] with potential for added physical therapy on an outpatient basis at her facility. Provided updated. KEDAR Carrasco RN                  Continued Care and Services - Admitted Since 8/31/2024    No active coordination exists for this encounter.       Selected Continued Care - Prior Encounters Includes continued care and service providers with selected services from prior encounters from 6/2/2024 to 9/1/2024      Discharged on 7/3/2024 Admission date: 6/28/2024 - Discharge disposition: Skilled Nursing Facility (DC - External)      Destination       Service Provider Selected Services Address Phone Fax Patient Preferred    [facility] [address] [facility] [address] [phone] --                             Demographic Summary    No documentation.                  Functional Status    No documentation.                  Psychosocial    No documentation.                  Abuse/Neglect    No documentation.                  Legal    No documentation.                  Substance Abuse    No documentation.                  Patient Forms    No documentation.                     Marco Antonio Dobbs,  RN

## 2024-09-01 NOTE — PROGRESS NOTES
MD Attestation Note    I supervised care provided by the midlevel provider.    The MERRITT and I have discussed this patient's history, physical exam, and treatment plan. I have reviewed the documentation and personally had a face to face interaction with the patient  I affirm the documentation and agree with the treatment and plan.       My personal findings are:        Subjective:  Patient doing better today, no complaints.        Objective:      PHYSICAL EXAM  Vitals:    08/31/24 2326 09/01/24 0321 09/01/24 0754 09/01/24 0846   BP: 150/77 150/59 (!) 185/72    BP Location: Right arm Right arm Right arm    Patient Position: Lying Lying Lying    Pulse: 69 64 68 70   Resp: 18 18 20 20   Temp: 97 °F (36.1 °C) 97.5 °F (36.4 °C) 97.9 °F (36.6 °C)    TempSrc: Oral Oral Oral    SpO2: 93% 98% 96% 96%   Weight:       Height:             GENERAL: no acute distress  HENT: nares patent  EYES: no scleral icterus  CV: regular rhythm, normal rate  RESPIRATORY: normal effort  ABDOMEN: soft  MUSCULOSKELETAL: no deformity  NEURO: alert, moves all extremities, follows commands  PSYCH:  calm, cooperative  SKIN: warm, dry    Vital signs and nursing notes reviewed.      Assessment/ Plan:  Patient presented emergency department with urinary tract infection, headache.  Poorly controlled blood pressure.  Only on metoprolol tartrate once a day, will plan to increase to twice and continue to monitor her symptoms.  Will follow-up PT recommendations for disposition.

## 2024-09-01 NOTE — CONSULTS
"Neurology Consult Note    Consult Date: 9/1/2024    Referring MD: No ref. provider found    Reason for Consult I have been asked to see the patient in neurological consultation to render advice and opinion regarding headache    Marianna Terrell is a 89 y.o. female with CKD, dementia, hypertension, hyperlipidemia who presented to the hospital with headache.  On presentation her blood pressure was markedly elevated.  She was diagnosed with a urinary tract infection.  Today her headache has essentially resolved.    Past Medical History:   Diagnosis Date    Anxiety     Chronic kidney disease (CKD), stage IV (severe)     Dementia 01-22    Difficulty walking 01-22    High cholesterol     HL (hearing loss) 01-22    Hypothyroid        Exam  BP (!) 185/72 (BP Location: Right arm, Patient Position: Lying)   Pulse 68   Temp 97.9 °F (36.6 °C) (Oral)   Resp 20   Ht 162.6 cm (64\")   Wt 69.3 kg (152 lb 11.2 oz)   SpO2 96%   BMI 26.21 kg/m²   Gen: NAD, vitals reviewed  MS: Disoriented, memory impaired, normal attention, language intact, no neglect  CN: visual acuity grossly normal, visual fields full, PERRL, EOMI, no facial droop, no dysarthria  Motor: 5/5 throughout upper and lower extremities, normal tone  Sensory: Intact to cold temperature throughout, diminished vibratory sensation bilateral lower extremities    DATA:    Lab Results   Component Value Date    GLUCOSE 108 (H) 09/01/2024    CALCIUM 8.7 09/01/2024     (L) 09/01/2024    K 3.9 09/01/2024    CO2 24.1 09/01/2024     09/01/2024    BUN 13 09/01/2024    CREATININE 0.61 09/01/2024    EGFRIFAFRI >60 12/02/2022    BCR 21.3 09/01/2024    ANIONGAP 9.9 09/01/2024     Lab Results   Component Value Date    WBC 10.16 09/01/2024    HGB 12.3 09/01/2024    HCT 37.3 09/01/2024    MCV 97.6 (H) 09/01/2024     09/01/2024       Lab review: CBC, BMP unremarkable, ESR 33    Imaging review: I personally reviewed her CT scan of the head performed in the emergency " department which shows a moderate white matter disease and moderate generalized atrophy.  Radiology report reviewed.    Diagnoses:  Hypertensive urgency  Headache due to above  Vascular dementia without behavioral disturbance    Pre-stroke MRS: 1  NIHSS: 0    Comment: Headache on the basis of hypertensive urgency.  Appears to have resolved with treatment of hypertension.  No headache red flags.  Sed rate mildly elevated but not unexpected for age.    PLAN:  No further neurologic workup indicated

## 2024-09-01 NOTE — DISCHARGE INSTRUCTIONS
Your blood pressure daily and record for your primary care doctor.  If systolic blood pressure is less than 100 or heart rate is less than 55 please hold your medications.

## 2024-09-01 NOTE — PLAN OF CARE
Problem: Hypertension Comorbidity  Goal: Blood Pressure in Desired Range  Outcome: Ongoing, Progressing     Problem: Adult Inpatient Plan of Care  Goal: Plan of Care Review  Outcome: Ongoing, Progressing  Flowsheets (Taken 9/1/2024 0431)  Plan of Care Reviewed With: patient  Outcome Evaluation: Patient AOx4, periods of confusion. patient did have a headache beginning of shift. Fall risk. bed alarm on. Plan for neuro to see  Goal: Patient-Specific Goal (Individualized)  Outcome: Ongoing, Progressing  Goal: Absence of Hospital-Acquired Illness or Injury  Outcome: Ongoing, Progressing  Intervention: Identify and Manage Fall Risk  Recent Flowsheet Documentation  Taken 9/1/2024 0400 by Neida Ivan RN  Safety Promotion/Fall Prevention: safety round/check completed  Taken 9/1/2024 0200 by Neida Ivan RN  Safety Promotion/Fall Prevention: safety round/check completed  Taken 9/1/2024 0005 by Neida Ivan RN  Safety Promotion/Fall Prevention: safety round/check completed  Taken 8/31/2024 2200 by Neida Ivan RN  Safety Promotion/Fall Prevention: safety round/check completed  Taken 8/31/2024 2025 by Neida Ivan RN  Safety Promotion/Fall Prevention: safety round/check completed  Goal: Optimal Comfort and Wellbeing  Outcome: Ongoing, Progressing  Goal: Readiness for Transition of Care  Outcome: Ongoing, Progressing     Problem: Skin Injury Risk Increased  Goal: Skin Health and Integrity  Outcome: Ongoing, Progressing     Problem: Fall Injury Risk  Goal: Absence of Fall and Fall-Related Injury  Outcome: Ongoing, Progressing  Intervention: Promote Injury-Free Environment  Recent Flowsheet Documentation  Taken 9/1/2024 0400 by Neida Ivan RN  Safety Promotion/Fall Prevention: safety round/check completed  Taken 9/1/2024 0200 by Neida Ivan RN  Safety Promotion/Fall Prevention: safety round/check completed  Taken 9/1/2024 0005 by Neida Ivan RN  Safety Promotion/Fall Prevention: safety round/check  completed  Taken 8/31/2024 2200 by Neida Ivan, RN  Safety Promotion/Fall Prevention: safety round/check completed  Taken 8/31/2024 2025 by Neida Ivan, RN  Safety Promotion/Fall Prevention: safety round/check completed   Goal Outcome Evaluation:  Plan of Care Reviewed With: patient           Outcome Evaluation: Patient AOx4, periods of confusion. patient did have a headache beginning of shift. Fall risk. bed alarm on. Plan for neuro to see

## 2024-09-01 NOTE — NURSING NOTE
When getting pt up to chair, a blister was found on her right flank with redness and indentation of the purwick tubing. Picture in chart, skin intact

## 2024-09-01 NOTE — DISCHARGE PLACEMENT REQUEST
"Marianna Terrell (89 y.o. Female)       Date of Birth   05/08/1931    Social Security Number       Address   [facility] [place] [address]    Home Phone       MRN   [id number]       [employer]   [employer]    Marital Status   Other                            Admission Date   8/31/24    Admission Type   Emergency    Admitting Provider   Gregg Mcrae MD    Attending Provider   Zane Osman MD    Department, Room/Bed   [facility] OBSERVATION, 110/1       Discharge Date       Discharge Disposition       Discharge Destination                                 Attending Provider: Zane Osman MD    Allergies: No Known Allergies    Isolation: None   Infection: None   Code Status: CPR    Ht: 162.6 cm (64\")   Wt: 69.3 kg (152 lb 11.2 oz)    Admission Cmt: None   Principal Problem: UTI (urinary tract infection) [N39.0]                   Active Insurance as of 8/31/2024       Primary Coverage       Payor Plan Insurance Group Employer/Plan Group    MEDICARE MEDICARE A ONLY        Payor Plan Address Payor Plan Phone Number Payor Plan Fax Number Effective Dates    [address] [phone]  5/1/1996 - None Entered    [facility] [id number]         Subscriber Name Subscriber Birth Date Member ID       MARIANNA TERRELL 5/8/1931 [id number]               Secondary Coverage       Payor Plan Insurance Group Employer/Plan Group    [id number]       Payor Plan Address Payor Plan Phone Number Payor Plan Fax Number Effective Dates    [address]   1/1/2006 - None Entered    [facility] [id number]         Subscriber Name Subscriber Birth Date Member ID       MARIANNA TERRELL 5/8/1931 [id number]                     Emergency Contacts        (Rel.) Home Phone Work Phone Mobile Phone    Pineda Terrell (Son) [phone] -- [phone]                "

## 2024-09-01 NOTE — DISCHARGE SUMMARY
ED OBSERVATION PROGRESS/DISCHARGE SUMMARY    Date of Admission: 8/31/2024   LOS: 0 days   PCP: Provider, No Known    Final Diagnosis: Headache, hypertensive urgency, urinary tract infection    Hospital Outcome:     Ms. Tamayo was admitted to the ED observation unit for further management with a diagnosis of UTI and headache.  Initial workup in the emergency department shows a high-sensitivity troponin of 22, glucose 131, WBCs 11.0, urinalysis shows 1+ blood, nitrate positive, 3+ leukocytes, trace of protein, 3-5 WBCs, too numerous to count WBCs, and 4+ bacteria.  Urine culture is pending.  Respiratory viral panel PCR is negative.  Chest x-ray shows low lung volumes without opacities identified.  CT head without contrast shows extensive chronic small vessel ischemic white matter change and atrophy.    Neurology consulted and has evaluated the patient.  They felt her headache was likely due to hypertensive urgency.  Headache has resolved with treatment of hypertension.  No further workup indicated from their standpoint.  I did adjust her metoprolol to 12.5 twice daily and added losartan to her regimen.  Patient should continue to monitor her blood pressure closely at home.  Physical therapy evaluated the patient and advised skilled nursing versus returning to assisted living facility.  She will return to assisted living facility with added physical therapy.  She will receive 3 more days of oral antibiotics.  Urine culture pending at this time.    ROS:  General: no fevers, chills  Respiratory: no cough, dyspnea  Cardiovascular: no chest pain, palpitations  Abdomen: No abdominal pain, nausea, vomiting, or diarrhea  Neurologic: No focal weakness    Objective   Physical Exam:  I have reviewed the vital signs.  Temp:  [97 °F (36.1 °C)-98.9 °F (37.2 °C)] 98.9 °F (37.2 °C)  Heart Rate:  [52-70] 65  Resp:  [18-20] 20  BP: (132-185)/(50-77) 135/54  General Appearance:    Alert, cooperative, no distress  Head:    Normocephalic,  atraumatic  Eyes:    Sclerae anicteric  Neck:   Supple, no mass  Lungs: Clear to auscultation bilaterally, respirations unlabored  Heart: Regular rate and rhythm, S1 and S2 normal, no murmur, rub or gallop  Abdomen:  Soft, nontender, bowel sounds active, nondistended  Extremities: No clubbing, cyanosis, or edema to lower extremities  Pulses:  2+ and symmetric in distal lower extremities  Skin: No rashes   Neurologic: Oriented x3, Normal strength to extremities    Results Review:    I have reviewed the labs, radiology results and diagnostic studies.    Results from last 7 days   Lab Units 09/01/24  0326   WBC 10*3/mm3 10.16   HEMOGLOBIN g/dL 12.3   HEMATOCRIT % 37.3   PLATELETS 10*3/mm3 214     Results from last 7 days   Lab Units 09/01/24  0326 08/31/24  0745   SODIUM mmol/L 134* 137   POTASSIUM mmol/L 3.9 4.0   CHLORIDE mmol/L 100 102   CO2 mmol/L 24.1 25.0   BUN mg/dL 13 14   CREATININE mg/dL 0.61 0.59   CALCIUM mg/dL 8.7 9.1   BILIRUBIN mg/dL  --  1.0   ALK PHOS U/L  --  59   ALT (SGPT) U/L  --  11   AST (SGOT) U/L  --  9   GLUCOSE mg/dL 108* 131*     Imaging Results (Last 24 Hours)       ** No results found for the last 24 hours. **            I have reviewed the medications.  ---------------------------------------------------------------------------------------------  Assessment & Plan   Assessment/Problem List    UTI (urinary tract infection)    Plan:    Urinary tract infection  -Urinalysis was positive for nitrites and leukocytes with TNTC WBCs, 4+ bacteria.  -Urine culture is pending  -Received Rocephin x2  -Vantin 100 mg twice daily x 3 more days     Headache  -Head CT negative for acute abnormality  -Blood pressure significantly elevated on arrival, headache improved with treatment  -ESR 34  -Neurology was consulted, no further workup needed from their standpoint     Uncontrolled hypertension  -Change metoprolol to 12.5 mg twice daily  -Added losartan to her regimen  -Continue to monitor blood pressure  closely at home, record readings at least once a day for your PCP  -Follow-up with your primary care provider regarding further medication adjustments    Hypothyroidism  -TSH 2.28  -Continue levothyroxine    Disposition: Return to assisted living facility with home physical therapy    Follow-up after Discharge: Primary care    This note will serve as a discharge summary    Sobeida Salazar, APRN 09/01/24 16:28 EDT    I have worn appropriate PPE during this patient encounter, sanitized my hands both with entering and exiting patient's room.    32 minutes has been spent by [facility] providers in the care of this patient while under observation status

## 2024-09-02 LAB — BACTERIA SPEC AEROBE CULT: ABNORMAL

## 2024-09-02 PROCEDURE — 97530 THERAPEUTIC ACTIVITIES: CPT

## 2024-09-02 PROCEDURE — 94799 UNLISTED PULMONARY SVC/PX: CPT

## 2024-09-02 PROCEDURE — 25010000002 CEFTRIAXONE PER 250 MG

## 2024-09-02 RX ADMIN — Medication 10 ML: at 08:38

## 2024-09-02 RX ADMIN — ATORVASTATIN CALCIUM 10 MG: 20 TABLET, FILM COATED ORAL at 08:37

## 2024-09-02 RX ADMIN — LEVOTHYROXINE SODIUM 75 MCG: 75 TABLET ORAL at 08:37

## 2024-09-02 RX ADMIN — CEFTRIAXONE SODIUM 1000 MG: 1 INJECTION, POWDER, FOR SOLUTION INTRAMUSCULAR; INTRAVENOUS at 02:15

## 2024-09-02 RX ADMIN — METOPROLOL TARTRATE 12.5 MG: 25 TABLET, FILM COATED ORAL at 20:28

## 2024-09-02 RX ADMIN — Medication 10 ML: at 20:29

## 2024-09-02 RX ADMIN — LOSARTAN POTASSIUM 25 MG: 25 TABLET, FILM COATED ORAL at 08:37

## 2024-09-02 RX ADMIN — ALBUTEROL SULFATE 2.5 MG: 2.5 SOLUTION RESPIRATORY (INHALATION) at 21:06

## 2024-09-02 RX ADMIN — METOPROLOL TARTRATE 12.5 MG: 25 TABLET, FILM COATED ORAL at 08:37

## 2024-09-02 RX ADMIN — ESCITALOPRAM 20 MG: 20 TABLET, FILM COATED ORAL at 20:28

## 2024-09-02 NOTE — THERAPY TREATMENT NOTE
Patient Name: Marianna Terrell  : 1931    MRN: [id number]                              Today's Date: 2024       Admit Date: 2024    Visit Dx:     ICD-10-CM ICD-9-CM   1. Urinary tract infection without hematuria, site unspecified  N39.0 599.0   2. Nonintractable headache, unspecified chronicity pattern, unspecified headache type  R51.9 784.0   3. Hypertension not at goal  I10 401.9   4. Weakness  R53.1 780.79     Patient Active Problem List   Diagnosis    UTI (urinary tract infection)    Dementia    Hypothyroidism (acquired)    CKD (chronic kidney disease)    Fall    Hyperlipidemia     Past Medical History:   Diagnosis Date    Anxiety     Chronic kidney disease (CKD), stage IV (severe)     Dementia     Difficulty walking     High cholesterol     HL (hearing loss)     Hypothyroid      Past Surgical History:   Procedure Laterality Date    CYSTECTOMY      HYSTERECTOMY      KNEE SURGERY        General Information       Row Name 24 1258          Physical Therapy Time and Intention    Document Type therapy note (daily note)  -CS     Mode of Treatment individual therapy;physical therapy  -CS       Row Name 24 1258          General Information    Patient Profile Reviewed yes  -CS     Existing Precautions/Restrictions fall  -CS       Row Name 24 1258          Cognition    Orientation Status (Cognition) oriented x 3  -CS       Row Name 24 1258          Safety Issues, Functional Mobility    Impairments Affecting Function (Mobility) balance;endurance/activity tolerance;pain;strength  -CS               User Key  (r) = Recorded By, (t) = Taken By, (c) = Cosigned By      Initials Name Provider Type    CS Dora Dey, PT Physical Therapist                   Mobility       Row Name 24 1258          Bed Mobility    Bed Mobility supine-sit  -CS     Supine-Sit [place] (Bed Mobility) moderate assist (50% patient effort);verbal cues;nonverbal cues (demo/gesture)  -CS      Assistive Device (Bed Mobility) bed rails;head of bed elevated  -CS     Comment, (Bed Mobility) cues for sequencing + increased time to complete; UIC at end of session  -CS       Row Name 09/02/24 1258          Sit-Stand Transfer    Sit-Stand [place] (Transfers) maximum assist (25% patient effort);verbal cues;nonverbal cues (demo/gesture)  -CS     Assistive Device (Sit-Stand Transfers) walker, front-wheeled  -CS     Comment, (Sit-Stand Transfer) 2x from EOB; cues for upright posture  -CS       Row Name 09/02/24 1258          Gait/Stairs (Locomotion)    [place] Level (Gait) moderate assist (50% patient effort);verbal cues;nonverbal cues (demo/gesture)  -CS     Assistive Device (Gait) walker, front-wheeled  -CS     Distance in Feet (Gait) 2  bed to chair  -CS     Deviations/Abnormal Patterns (Gait) maximino decreased;gait speed decreased;stride length decreased;weight shifting decreased  -CS     Bilateral Gait Deviations forward flexed posture;heel strike decreased  -CS     [place] Level (Stairs) not tested  -CS     Comment, (Gait/Stairs) heavy flexed posture; short stride length; fatigues quickly  -CS               User Key  (r) = Recorded By, (t) = Taken By, (c) = Cosigned By      Initials Name Provider Type    CS Dora Dey PT Physical Therapist                   Obj/Interventions       Row Name 09/02/24 1259          Balance    Balance Assessment sitting static balance;sitting dynamic balance;standing static balance;standing dynamic balance  -     Static Sitting Balance contact guard  -CS     Dynamic Sitting Balance contact guard  -CS     Position, Sitting Balance unsupported;sitting edge of bed  -CS     Static Standing Balance moderate assist  -CS     Dynamic Standing Balance moderate assist  -CS     Position/Device Used, Standing Balance supported;walker, front-wheeled  -CS               User Key  (r) = Recorded By, (t) = Taken By, (c) = Cosigned By      Initials Name Provider Type    CS  Dora Dey, PT Physical Therapist                   Goals/Plan    No documentation.                  Clinical Impression       Row Name 09/02/24 1300          Pain    Pre/Posttreatment Pain Comment reports headache but unable to provide numerical rating but does reports better than yesterday  -CS     Pain Intervention(s) Rest  -CS       Row Name 09/02/24 1300          Plan of Care Review    Plan of Care Reviewed With patient  -CS     Progress improving  -CS     Outcome Evaluation Pt received in bed and agreeable to PT. Pt required mod A and increased time to reach sitting EOB. Pt stood multiple times from EOB requiring max A. Pt demo's a strong flexed posture with cues provided to correct. Pt eventually able to take a few steps to chair c RW requiring mod A. Pt fatigues quickly making is difficult to progress mobility. PT recommends SNF at D/C to address functional deficits.  -CS       Row Name 09/02/24 1300          Therapy Assessment/Plan (PT)    Criteria for Skilled Interventions Met (PT) yes;meets criteria  -CS     Therapy Frequency (PT) 5 times/wk  -CS       Row Name 09/02/24 1300          Positioning and Restraints    Pre-Treatment Position in bed  -CS     Post Treatment Position chair  -CS     In Chair notified nsg;reclined;call light within reach;encouraged to call for assist;exit alarm on  -CS               User Key  (r) = Recorded By, (t) = Taken By, (c) = Cosigned By      Initials Name Provider Type    CS Dora Dey, PT Physical Therapist                   Outcome Measures       Row Name 09/02/24 1303 09/02/24 0840       How much help from another person do you currently need...    Turning from your back to your side while in flat bed without using bedrails? 3  -CS 3  -MC    Moving from lying on back to sitting on the side of a flat bed without bedrails? 2  -CS 2  -MC    Moving to and from a bed to a chair (including a wheelchair)? 2  -CS 2  -MC    Standing up from a chair using your arms (e.g.,  wheelchair, bedside chair)? 2  -CS 2  -MC    Climbing 3-5 steps with a railing? 1  -CS 1  -MC    To walk in hospital room? 2  -CS 2  -MC    AM-PAC 6 Clicks Score (PT) 12  - 12  -    Highest Level of Mobility Goal 4 --> Transfer to chair/commode  - 4 --> Transfer to chair/commode  -      Row Name 09/02/24 1303          Functional Assessment    Outcome Measure Options AM-PAC 6 Clicks Basic Mobility (PT)  -               User Key  (r) = Recorded By, (t) = Taken By, (c) = Cosigned By      Initials Name Provider Type     Delma Lynne, RN Registered Nurse    CS Dora Dey, PT Physical Therapist                                 Physical Therapy Education       Title: PT OT SLP Therapies (In Progress)       Topic: Physical Therapy (In Progress)       Point: Mobility training (Done)       Learning Progress Summary             Patient Acceptance, E,TB, VU,DU,NR by  at 9/2/2024 1303    Acceptance, E,TB, VU,NR by  at 8/31/2024 1433                         Point: Home exercise program (Not Started)       Learner Progress:  Not documented in this visit.              Point: Body mechanics (Done)       Learning Progress Summary             Patient Acceptance, E,TB, VU,DU,NR by  at 9/2/2024 1303                         Point: Precautions (Done)       Learning Progress Summary             Patient Acceptance, E,TB, VU,DU,NR by  at 9/2/2024 1303                                         User Key       Initials Effective Dates Name Provider Type Discipline     09/22/22 -  Sydni Armendariz PT Physical Therapist PT     09/22/22 -  Dora Dey PT Physical Therapist PT                  PT Recommendation and Plan     Plan of Care Reviewed With: patient  Progress: improving  Outcome Evaluation: Pt received in bed and agreeable to PT. Pt required mod A and increased time to reach sitting EOB. Pt stood multiple times from EOB requiring max A. Pt demo's a strong flexed posture with cues provided to correct.  Pt eventually able to take a few steps to chair c RW requiring mod A. Pt fatigues quickly making is difficult to progress mobility. PT recommends SNF at D/C to address functional deficits.     Time Calculation:         PT Charges       Row Name 09/02/24 1303             Time Calculation    Start Time 1119  -CS      Stop Time 1133  -CS      Time Calculation (min) 14 min  -CS      PT Received On 09/02/24  -CS      PT - Next Appointment 09/03/24  -CS         Time Calculation- PT    Total Timed Code Minutes- PT 12 minute(s)  -CS         Timed Charges    65409 - PT Therapeutic Activity Minutes 12  -CS         Total Minutes    Timed Charges Total Minutes 12  -CS       Total Minutes 12  -CS                User Key  (r) = Recorded By, (t) = Taken By, (c) = Cosigned By      Initials Name Provider Type    CS Dora Dey, PT Physical Therapist                  Therapy Charges for Today       Code Description Service Date Service Provider Modifiers Qty    [id number]  PT THERAPEUTIC ACT EA 15 MIN 9/2/2024 Dora Dey, PT GP 1            PT G-Codes  Outcome Measure Options: AM-PAC 6 Clicks Basic Mobility (PT)  AM-PAC 6 Clicks Score (PT): 12  PT Discharge Summary  Anticipated Discharge Disposition (PT): skilled nursing facility    Dora Dey PT  9/2/2024

## 2024-09-02 NOTE — PLAN OF CARE
Goal Outcome Evaluation:  Plan of Care Reviewed With: patient           Outcome Evaluation: VSS,  A&O x4 but forgetful at times, pleasant and easy to reorient. on falls with bed alarm for safety, saline locked, given IV abx, up with asst, did note on skin assessmnet there are two blistes on right side of back (mepilex over them), bottom pink and blanchable, yinka continue to monitor

## 2024-09-02 NOTE — PLAN OF CARE
Goal Outcome Evaluation:  Plan of Care Reviewed With: patient        Progress: improving  Outcome Evaluation: Pt received in bed and agreeable to PT. Pt required mod A and increased time to reach sitting EOB. Pt stood multiple times from EOB requiring max A. Pt demo's a strong flexed posture with cues provided to correct. Pt eventually able to take a few steps to chair c RW requiring mod A. Pt fatigues quickly making is difficult to progress mobility. PT recommends SNF at D/C to address functional deficits.      Anticipated Discharge Disposition (PT): skilled nursing facility

## 2024-09-02 NOTE — PROGRESS NOTES
"DAILY PROGRESS NOTE  [facility]    Patient Identification:  Name: Marianna Terrell  Age: 89 y.o.  Sex: female  :  1931  MRN: [id number]         Primary Care Physician: Provider, No Known    Subjective:  Interval History: She is very weak.    Objective:    Scheduled Meds:atorvastatin, 10 mg, Oral, Daily  escitalopram, 20 mg, Oral, Nightly  levothyroxine, 75 mcg, Oral, Daily  losartan, 25 mg, Oral, Q24H  metoprolol tartrate, 12.5 mg, Oral, Q12H  sodium chloride, 10 mL, Intravenous, Q12H      Continuous Infusions:     Vital signs in last 24 hours:  Temp:  [97 °F (36.1 °C)-98.9 °F (37.2 °C)] 97.3 °F (36.3 °C)  Heart Rate:  [62-75] 69  Resp:  [20-22] 20  BP: (126-140)/(54-83) 126/59    Intake/Output:    Intake/Output Summary (Last 24 hours) at 2024 1508  Last data filed at 2024 1341  Gross per 24 hour   Intake 600 ml   Output 700 ml   Net -100 ml       Exam:  /59 (BP Location: Right arm, Patient Position: Lying)   Pulse 69   Temp 97.3 °F (36.3 °C) (Oral)   Resp 20   Ht 162.6 cm (64\")   Wt 70.1 kg (154 lb 8.7 oz)   SpO2 91%   BMI 26.53 kg/m²     General Appearance:    Alert, cooperative, no distress   Head:    Normocephalic, without obvious abnormality, atraumatic   Eyes:       Throat:   Lips, tongue, gums normal   Neck:   Supple, symmetrical, trachea midline, no JVD   Lungs:     Clear to auscultation bilaterally, respirations unlabored   Chest Wall:    No tenderness or deformity    Heart:    Regular rate and rhythm, S1 and S2 normal, no murmur,no  rub or gallop   Abdomen:     Soft, nontender, bowel sounds active, no masses, no organomegaly    Extremities:   Extremities normal, atraumatic, no cyanosis or edema   Pulses:      Skin:   Skin is warm and dry,  no rashes or palpable lesions   Neurologic:   no focal deficits noted      Lab Results (last 72 hours)       Procedure Component Value Units Date/Time    Urine Culture - Urine, Straight Cath [[id number]]  (Abnormal)  " (Susceptibility) Collected: 08/31/24 0809    Specimen: Urine from Straight Cath Updated: 09/02/24 0308     Urine Culture >100,000 CFU/mL Escherichia coli    Narrative:      Colonization of the urinary tract without infection is common. Treatment is discouraged unless the patient is symptomatic, pregnant, or undergoing an invasive urologic procedure.    Susceptibility        Escherichia coli      TIN      Amoxicillin + Clavulanate Resistant      Ampicillin Resistant      Ampicillin + Sulbactam Intermediate      Cefazolin Susceptible      Cefepime Susceptible      Ceftazidime Susceptible      Ceftriaxone Susceptible      Gentamicin Susceptible      Levofloxacin Susceptible      Nitrofurantoin Susceptible      Piperacillin + Tazobactam Susceptible      Trimethoprim + Sulfamethoxazole Susceptible                           Basic Metabolic Panel [[id number]]  (Abnormal) Collected: 09/01/24 0326    Specimen: Blood from Arm, Left Updated: 09/01/24 0356     Glucose 108 mg/dL      BUN 13 mg/dL      Creatinine 0.61 mg/dL      Sodium 134 mmol/L      Potassium 3.9 mmol/L      Chloride 100 mmol/L      CO2 24.1 mmol/L      Calcium 8.7 mg/dL      BUN/Creatinine Ratio 21.3     Anion Gap 9.9 mmol/L      eGFR 83.5 mL/min/1.73     Narrative:      GFR Normal >60  Chronic Kidney Disease <60  Kidney Failure <15    The GFR formula is only valid for adults with stable renal function between ages 18 and 70.    CBC Auto Differential [[id number]]  (Abnormal) Collected: 09/01/24 0326    Specimen: Blood from Arm, Left Updated: 09/01/24 0339     WBC 10.16 10*3/mm3      RBC 3.82 10*6/mm3      Hemoglobin 12.3 g/dL      Hematocrit 37.3 %      MCV 97.6 fL      MCH 32.2 pg      MCHC 33.0 g/dL      RDW 12.2 %      RDW-SD 43.6 fl      MPV 9.6 fL      Platelets 214 10*3/mm3      Neutrophil % 68.2 %      Lymphocyte % 19.2 %      Monocyte % 8.9 %      Eosinophil % 2.8 %      Basophil % 0.4 %      Immature Grans % 0.5 %      Neutrophils, Absolute 6.94  10*3/mm3      Lymphocytes, Absolute 1.95 10*3/mm3      Monocytes, Absolute 0.90 10*3/mm3      Eosinophils, Absolute 0.28 10*3/mm3      Basophils, Absolute 0.04 10*3/mm3      Immature Grans, Absolute 0.05 10*3/mm3      nRBC 0.0 /100 WBC     TSH Rfx On Abnormal To Free T4 [[id number]]  (Normal) Collected: 08/31/24 0745    Specimen: Blood Updated: 08/31/24 1128     TSH 2.280 uIU/mL     Sedimentation Rate [[id number]]  (Abnormal) Collected: 08/31/24 0745    Specimen: Blood Updated: 08/31/24 1117     Sed Rate 34 mm/hr     Respiratory Panel PCR w/COVID-19(SARS-CoV-2) CONRAD/NICKY/ZULEYKA/PAD/COR/MANDY In-House, NP Swab in UTM/VTM, 2 HR TAT - Swab, Nasopharynx [[id number]]  (Normal) Collected: 08/31/24 0744    Specimen: Swab from Nasopharynx Updated: 08/31/24 0848     ADENOVIRUS, PCR Not Detected     Coronavirus 229E Not Detected     Coronavirus HKU1 Not Detected     Coronavirus NL63 Not Detected     Coronavirus OC43 Not Detected     COVID19 Not Detected     Human Metapneumovirus Not Detected     Human Rhinovirus/Enterovirus Not Detected     Influenza A PCR Not Detected     Influenza B PCR Not Detected     Parainfluenza Virus 1 Not Detected     Parainfluenza Virus 2 Not Detected     Parainfluenza Virus 3 Not Detected     Parainfluenza Virus 4 Not Detected     RSV, PCR Not Detected     Bordetella pertussis pcr Not Detected     Bordetella parapertussis PCR Not Detected     Chlamydophila pneumoniae PCR Not Detected     Mycoplasma pneumo by PCR Not Detected    Narrative:      In the setting of a positive respiratory panel with a viral infection PLUS a negative procalcitonin without other underlying concern for bacterial infection, consider observing off antibiotics or discontinuation of antibiotics and continue supportive care. If the respiratory panel is positive for atypical bacterial infection (Bordetella pertussis, Chlamydophila pneumoniae, or Mycoplasma pneumoniae), consider antibiotic de-escalation to target atypical bacterial  infection.    Urinalysis, Microscopic Only - Straight Cath [[id number]]  (Abnormal) Collected: 08/31/24 0809    Specimen: Urine from Straight Cath Updated: 08/31/24 0839     RBC, UA 3-5 /HPF      WBC, UA Too Numerous to Count /HPF      Bacteria, UA 4+ /HPF      Squamous Epithelial Cells, UA 0-2 /HPF      Hyaline Casts, UA 3-6 /LPF      Methodology Automated Microscopy    Urinalysis With Culture If Indicated - Straight Cath [[id number]]  (Abnormal) Collected: 08/31/24 0809    Specimen: Urine from Straight Cath Updated: 08/31/24 0827     Color, UA Yellow     Appearance, UA Turbid     pH, UA 7.5     Specific Gravity, UA 1.016     Glucose, UA Negative     Ketones, UA Negative     Bilirubin, UA Negative     Blood, UA Small (1+)     Protein, UA Trace     Leuk Esterase, UA Large (3+)     Nitrite, UA Positive     Urobilinogen, UA 1.0 E.U./dL    Narrative:      In absence of clinical symptoms, the presence of pyuria, bacteria, and/or nitrites on the urinalysis result does not correlate with infection.    Single High Sensitivity Troponin T [[id number]]  (Abnormal) Collected: 08/31/24 0745    Specimen: Blood Updated: 08/31/24 0823     HS Troponin T 22 ng/L     Narrative:      High Sensitive Troponin T Reference Range:  <14.0 ng/L- Negative Female for AMI  <22.0 ng/L- Negative Male for AMI  >=14 - Abnormal Female indicating possible myocardial injury.  >=22 - Abnormal Male indicating possible myocardial injury.   Clinicians would have to utilize clinical acumen, EKG, Troponin, and serial changes to determine if it is an Acute Myocardial Infarction or myocardial injury due to an underlying chronic condition.         BNP [[id number]]  (Normal) Collected: 08/31/24 0745    Specimen: Blood Updated: 08/31/24 0823     proBNP 386.0 pg/mL     Narrative:      This assay is used as an aid in the diagnosis of individuals suspected of having heart failure. It can be used as an aid in the diagnosis of acute decompensated heart failure (ADHF) in  "patients presenting with signs and symptoms of ADHF to the emergency department (ED). In addition, NT-proBNP of <300 pg/mL indicates ADHF is not likely.    Age Range Result Interpretation  NT-proBNP Concentration (pg/mL:      <50             Positive            >450                   Gray                 300-450                    Negative             <300    50-75           Positive            >900                  Gray                300-900                  Negative            <300      >75             Positive            >1800                  Gray                300-1800                  Negative            <300    Procalcitonin [[id number]]  (Normal) Collected: 08/31/24 0745    Specimen: Blood Updated: 08/31/24 0823     Procalcitonin 0.05 ng/mL     Narrative:      As a Marker for Sepsis (Non-Neonates):    1. <0.5 ng/mL represents a low risk of severe sepsis and/or septic shock.  2. >2 ng/mL represents a high risk of severe sepsis and/or septic shock.    As a Marker for Lower Respiratory Tract Infections that require antibiotic therapy:    PCT on Admission    Antibiotic Therapy       6-12 Hrs later    >0.5                Strongly Recommended  >0.25 - <0.5        Recommended   0.1 - 0.25          Discouraged              Remeasure/reassess PCT  <0.1                Strongly Discouraged     Remeasure/reassess PCT    As 28 day mortality risk marker: \"Change in Procalcitonin Result\" (>80% or <=80%) if Day 0 (or Day 1) and Day 4 values are available. Refer to http://[email or web address]-pct-calculator.com    Change in PCT <=80%  A decrease of PCT levels below or equal to 80% defines a positive change in PCT test result representing a higher risk for 28-day all-cause mortality of patients diagnosed with severe sepsis for septic shock.    Change in PCT >80%  A decrease of PCT levels of more than 80% defines a negative change in PCT result representing a lower risk for 28-day all-cause mortality of patients diagnosed with severe " sepsis or septic shock.       Comprehensive Metabolic Panel [[id number]]  (Abnormal) Collected: 08/31/24 0745    Specimen: Blood Updated: 08/31/24 0817     Glucose 131 mg/dL      BUN 14 mg/dL      Creatinine 0.59 mg/dL      Sodium 137 mmol/L      Potassium 4.0 mmol/L      Chloride 102 mmol/L      CO2 25.0 mmol/L      Calcium 9.1 mg/dL      Total Protein 6.7 g/dL      Albumin 4.1 g/dL      ALT (SGPT) 11 U/L      AST (SGOT) 9 U/L      Alkaline Phosphatase 59 U/L      Total Bilirubin 1.0 mg/dL      Globulin 2.6 gm/dL      A/G Ratio 1.6 g/dL      BUN/Creatinine Ratio 23.7     Anion Gap 10.0 mmol/L      eGFR 84.2 mL/min/1.73     Narrative:      GFR Normal >60  Chronic Kidney Disease <60  Kidney Failure <15    The GFR formula is only valid for adults with stable renal function between ages 18 and 70.    Lactic Acid, Plasma [[id number]]  (Normal) Collected: 08/31/24 0745    Specimen: Blood Updated: 08/31/24 0814     Lactate 1.1 mmol/L     CBC & Differential [[id number]]  (Abnormal) Collected: 08/31/24 0745    Specimen: Blood Updated: 08/31/24 0809    Narrative:      The following orders were created for panel order CBC & Differential.  Procedure                               Abnormality         Status                     ---------                               -----------         ------                     CBC Auto Differential[[id number]]        Abnormal            Final result                 Please view results for these tests on the individual orders.    CBC Auto Differential [[id number]]  (Abnormal) Collected: 08/31/24 0745    Specimen: Blood Updated: 08/31/24 0809     WBC 11.00 10*3/mm3      RBC 4.09 10*6/mm3      Hemoglobin 13.1 g/dL      Hematocrit 39.7 %      MCV 97.1 fL      MCH 32.0 pg      MCHC 33.0 g/dL      RDW 11.9 %      RDW-SD 42.5 fl      MPV 9.9 fL      Platelets 217 10*3/mm3      Neutrophil % 74.1 %      Lymphocyte % 16.3 %      Monocyte % 7.4 %      Eosinophil % 1.2 %      Basophil % 0.4 %      Immature  "Grans % 0.6 %      Neutrophils, Absolute 8.16 10*3/mm3      Lymphocytes, Absolute 1.79 10*3/mm3      Monocytes, Absolute 0.81 10*3/mm3      Eosinophils, Absolute 0.13 10*3/mm3      Basophils, Absolute 0.04 10*3/mm3      Immature Grans, Absolute 0.07 10*3/mm3      nRBC 0.0 /100 WBC           Data Review:  Results from last 7 days   Lab Units 09/01/24  0326 08/31/24  0745   SODIUM mmol/L 134* 137   POTASSIUM mmol/L 3.9 4.0   CHLORIDE mmol/L 100 102   CO2 mmol/L 24.1 25.0   BUN mg/dL 13 14   CREATININE mg/dL 0.61 0.59   GLUCOSE mg/dL 108* 131*   CALCIUM mg/dL 8.7 9.1     Results from last 7 days   Lab Units 09/01/24  0326 08/31/24  0745   WBC 10*3/mm3 10.16 11.00*   HEMOGLOBIN g/dL 12.3 13.1   HEMATOCRIT % 37.3 39.7   PLATELETS 10*3/mm3 214 217     Results from last 7 days   Lab Units 08/31/24  0745   TSH uIU/mL 2.280         Lab Results   Lab Value Date/Time    TROPONINT 22 (H) 08/31/2024 0745    TROPONINT 22 (H) 06/29/2024 0536    TROPONINT 23 (H) 06/29/2024 0123    TROPONINT 23 (H) 06/28/2024 2319         Results from last 7 days   Lab Units 08/31/24  0745   ALK PHOS U/L 59   BILIRUBIN mg/dL 1.0   ALT (SGPT) U/L 11   AST (SGOT) U/L 9     Results from last 7 days   Lab Units 08/31/24  0745   TSH uIU/mL 2.280         No results found for: \"POCGLU\"        Past Medical History:   Diagnosis Date    Anxiety     Chronic kidney disease (CKD), stage IV (severe)     Dementia 01-22    Difficulty walking 01-22    High cholesterol     HL (hearing loss) 01-22    Hypothyroid        Assessment:  Active Hospital Problems    Diagnosis  POA    **UTI (urinary tract infection) [N39.0]  Yes    CKD (chronic kidney disease) [N18.9]  Yes    Dementia [F03.90]  Yes    Hyperlipidemia [E78.5]  Yes    Hypothyroidism (acquired) [E03.9]  Yes      Resolved Hospital Problems   No resolved problems to display.       Plan:  Continue with current medications.  Finished antibiotics for UTI.  DC planning.  PT felt that she probably should go to a skilled " nursing facility for rehab.  Follow-up on labs and cultures.    Chaka Morfin MD  9/2/2024  15:08 EDT

## 2024-09-02 NOTE — PLAN OF CARE
Goal Outcome Evaluation:  Plan of Care Reviewed With: patient        Progress: no change  Outcome Evaluation: No complaints. Rested well between care. Up in chair with PT - they recommend assist x2, stand and pivot at this time. Meds given without difficulty. SCDs on when in bed. Incontience care provided.

## 2024-09-03 LAB
ANION GAP SERPL CALCULATED.3IONS-SCNC: 8 MMOL/L (ref 5–15)
BASOPHILS # BLD AUTO: 0.04 10*3/MM3 (ref 0–0.2)
BASOPHILS NFR BLD AUTO: 0.5 % (ref 0–1.5)
BUN SERPL-MCNC: 16 MG/DL (ref 8–23)
BUN/CREAT SERPL: 21.3 (ref 7–25)
CALCIUM SPEC-SCNC: 8.4 MG/DL (ref 8.2–9.6)
CHLORIDE SERPL-SCNC: 105 MMOL/L (ref 98–107)
CO2 SERPL-SCNC: 24 MMOL/L (ref 22–29)
CREAT SERPL-MCNC: 0.75 MG/DL (ref 0.57–1)
DEPRECATED RDW RBC AUTO: 42.3 FL (ref 37–54)
EGFRCR SERPLBLD CKD-EPI 2021: 74.3 ML/MIN/1.73
EOSINOPHIL # BLD AUTO: 0.3 10*3/MM3 (ref 0–0.4)
EOSINOPHIL NFR BLD AUTO: 3.4 % (ref 0.3–6.2)
ERYTHROCYTE [DISTWIDTH] IN BLOOD BY AUTOMATED COUNT: 12 % (ref 12.3–15.4)
GLUCOSE SERPL-MCNC: 98 MG/DL (ref 65–99)
HCT VFR BLD AUTO: 35.9 % (ref 34–46.6)
HGB BLD-MCNC: 12.2 G/DL (ref 12–15.9)
IMM GRANULOCYTES # BLD AUTO: 0.07 10*3/MM3 (ref 0–0.05)
IMM GRANULOCYTES NFR BLD AUTO: 0.8 % (ref 0–0.5)
LYMPHOCYTES # BLD AUTO: 1.92 10*3/MM3 (ref 0.7–3.1)
LYMPHOCYTES NFR BLD AUTO: 21.6 % (ref 19.6–45.3)
MCH RBC QN AUTO: 32.9 PG (ref 26.6–33)
MCHC RBC AUTO-ENTMCNC: 34 G/DL (ref 31.5–35.7)
MCV RBC AUTO: 96.8 FL (ref 79–97)
MONOCYTES # BLD AUTO: 0.91 10*3/MM3 (ref 0.1–0.9)
MONOCYTES NFR BLD AUTO: 10.3 % (ref 5–12)
NEUTROPHILS NFR BLD AUTO: 5.63 10*3/MM3 (ref 1.7–7)
NEUTROPHILS NFR BLD AUTO: 63.4 % (ref 42.7–76)
NRBC BLD AUTO-RTO: 0 /100 WBC (ref 0–0.2)
PLATELET # BLD AUTO: 206 10*3/MM3 (ref 140–450)
PMV BLD AUTO: 9.8 FL (ref 6–12)
POTASSIUM SERPL-SCNC: 4.1 MMOL/L (ref 3.5–5.2)
RBC # BLD AUTO: 3.71 10*6/MM3 (ref 3.77–5.28)
SODIUM SERPL-SCNC: 137 MMOL/L (ref 136–145)
WBC NRBC COR # BLD AUTO: 8.87 10*3/MM3 (ref 3.4–10.8)

## 2024-09-03 PROCEDURE — 94799 UNLISTED PULMONARY SVC/PX: CPT

## 2024-09-03 PROCEDURE — 85025 COMPLETE CBC W/AUTO DIFF WBC: CPT | Performed by: HOSPITALIST

## 2024-09-03 PROCEDURE — 80048 BASIC METABOLIC PNL TOTAL CA: CPT | Performed by: HOSPITALIST

## 2024-09-03 PROCEDURE — 97530 THERAPEUTIC ACTIVITIES: CPT

## 2024-09-03 PROCEDURE — 97110 THERAPEUTIC EXERCISES: CPT

## 2024-09-03 RX ADMIN — ALBUTEROL SULFATE 2.5 MG: 2.5 SOLUTION RESPIRATORY (INHALATION) at 20:34

## 2024-09-03 RX ADMIN — SENNOSIDES AND DOCUSATE SODIUM 2 TABLET: 50; 8.6 TABLET ORAL at 16:11

## 2024-09-03 RX ADMIN — Medication 10 ML: at 20:31

## 2024-09-03 RX ADMIN — LOSARTAN POTASSIUM 25 MG: 25 TABLET, FILM COATED ORAL at 09:20

## 2024-09-03 RX ADMIN — METOPROLOL TARTRATE 12.5 MG: 25 TABLET, FILM COATED ORAL at 09:20

## 2024-09-03 RX ADMIN — ACETAMINOPHEN 325MG 650 MG: 325 TABLET ORAL at 20:30

## 2024-09-03 RX ADMIN — Medication 10 ML: at 09:21

## 2024-09-03 RX ADMIN — ATORVASTATIN CALCIUM 10 MG: 20 TABLET, FILM COATED ORAL at 09:20

## 2024-09-03 RX ADMIN — LEVOTHYROXINE SODIUM 75 MCG: 75 TABLET ORAL at 09:20

## 2024-09-03 RX ADMIN — ESCITALOPRAM 20 MG: 20 TABLET, FILM COATED ORAL at 20:30

## 2024-09-03 RX ADMIN — METOPROLOL TARTRATE 12.5 MG: 25 TABLET, FILM COATED ORAL at 20:31

## 2024-09-03 NOTE — PLAN OF CARE
Goal Outcome Evaluation:  Plan of Care Reviewed With: patient        Progress: improving  Outcome Evaluation: vss, pt is alert x4 but forgetful, no c/o pain, bed alarm for safety, meds whole with water, turned to sides, on iv rocephine, continue to monitor the pt.

## 2024-09-03 NOTE — THERAPY TREATMENT NOTE
Patient Name: Marianna Terrell  : 1931    MRN: [id number]                              Today's Date: 9/3/2024       Admit Date: 2024    Visit Dx:     ICD-10-CM ICD-9-CM   1. Urinary tract infection without hematuria, site unspecified  N39.0 599.0   2. Nonintractable headache, unspecified chronicity pattern, unspecified headache type  R51.9 784.0   3. Hypertension not at goal  I10 401.9   4. Weakness  R53.1 780.79     Patient Active Problem List   Diagnosis    UTI (urinary tract infection)    Dementia    Hypothyroidism (acquired)    CKD (chronic kidney disease)    Fall    Hyperlipidemia     Past Medical History:   Diagnosis Date    Anxiety     Chronic kidney disease (CKD), stage IV (severe)     Dementia     Difficulty walking     High cholesterol     HL (hearing loss)     Hypothyroid      Past Surgical History:   Procedure Laterality Date    CYSTECTOMY      HYSTERECTOMY      KNEE SURGERY        General Information       Row Name 24 1344          Physical Therapy Time and Intention    Document Type therapy note (daily note)  -AR     Mode of Treatment physical therapy  -AR       Row Name 24 1344          General Information    Patient Profile Reviewed yes  -AR     Existing Precautions/Restrictions fall  -AR       Row Name 24 1344          Cognition    Orientation Status (Cognition) oriented to;person;place  -AR               User Key  (r) = Recorded By, (t) = Taken By, (c) = Cosigned By      Initials Name Provider Type    Joan Verde PT Physical Therapist                   Mobility       Row Name 24 1344          Bed Mobility    Sit-Supine [place] (Bed Mobility) maximum assist (25% patient effort)  -AR     Assistive Device (Bed Mobility) bed rails;head of bed elevated  -AR       Row Name 24 1344          Transfers    Comment, (Transfers) posterior lean, feet sliding, reequiredknesblocked, HHAx2,  -AR       Row Name 24 1344          Sit-Stand  Transfer    Sit-Stand [place] (Transfers) moderate assist (50% patient effort);2 person assist  -AR     Comment, (Sit-Stand Transfer) with RW twice, too muchof postriorlean w/ feet sliding, tried again w/mod A x2 w/ B knees/feet blocked and HHAx2  -AR       Row Name 09/03/24 1344          Gait/Stairs (Locomotion)    Bell Level (Gait) moderate assist (50% patient effort);2 person assist  -AR     Distance in Feet (Gait) 1  -AR     Deviations/Abnormal Patterns (Gait) maximino decreased;gait speed decreased;stride length decreased;weight shifting decreased  -AR     Bilateral Gait Deviations forward flexed posture;heel strike decreased  -AR     Comment, (Gait/Stairs) posterior lean  -AR               User Key  (r) = Recorded By, (t) = Taken By, (c) = Cosigned By      Initials Name Provider Type    Joan Verde, PT Physical Therapist                   Obj/Interventions       Row Name 09/03/24 1346          Balance    Dynamic Standing Balance maximum assist  -AR               User Key  (r) = Recorded By, (t) = Taken By, (c) = Cosigned By      Initials Name Provider Type    Joan Verde, PT Physical Therapist                   Goals/Plan    No documentation.                  Clinical Impression       Row Name 09/03/24 1346          Pain    Pretreatment Pain Rating 0/10 - no pain  -AR     Posttreatment Pain Rating 0/10 - no pain  -AR     Pain Intervention(s) Repositioned  -AR       Row Name 09/03/24 1346          Plan of Care Review    Outcome Evaluation Impaired balance, activity toleranceand independence w/ mobility during PT today.  Required mod A x2to stand few times w/ RW, due to strong posterior lean and feet sliding; used HHAx2 with B knees  blocked.  Able to take few small shuffling steps chair>bed with mod A x2, cues for sequencing, posterior leand w/ forward flexed posture.   Recommend DC to SNU.  -AR       Row Name 09/03/24 1346          Therapy Assessment/Plan (PT)    Rehab Potential  (PT) good, to achieve stated therapy goals  -AR     Therapy Frequency (PT) 5 times/wk  -AR       Row Name 09/03/24 1346          Positioning and Restraints    Pre-Treatment Position sitting in chair/recliner  -AR     Post Treatment Position bed  -AR     In Bed supine;notified nsg;call light within reach;encouraged to call for assist;exit alarm on  -AR               User Key  (r) = Recorded By, (t) = Taken By, (c) = Cosigned By      Initials Name Provider Type    Joan Verde, LAURIE Physical Therapist                   Outcome Measures       Row Name 09/03/24 1348 09/03/24 0920       How much help from another person do you currently need...    Turning from your back to your side while in flat bed without using bedrails? 3  -AR 3  -KS    Moving from lying on back to sitting on the side of a flat bed without bedrails? 2  -AR 2  -KS    Moving to and from a bed to a chair (including a wheelchair)? 2  -AR 2  -KS    Standing up from a chair using your arms (e.g., wheelchair, bedside chair)? 2  -AR 2  -KS    Climbing 3-5 steps with a railing? 1  -AR 1  -KS    To walk in hospital room? 1  -AR 2  -KS    AM-PAC 6 Clicks Score (PT) 11  -AR 12  -KS    Highest Level of Mobility Goal 4 --> Transfer to chair/commode  -AR 4 --> Transfer to chair/commode  -KS      Row Name 09/03/24 1348          Functional Assessment    Outcome Measure Options AM-PAC 6 Clicks Basic Mobility (PT)  -AR               User Key  (r) = Recorded By, (t) = Taken By, (c) = Cosigned By      Initials Name Provider Type    Joan Verde, PT Physical Therapist    Krissy Pastor, RN Registered Nurse                                 Physical Therapy Education       Title: PT OT SLP Therapies (In Progress)       Topic: Physical Therapy (In Progress)       Point: Mobility training (In Progress)       Learning Progress Summary             Patient Acceptance, E, NR by AR at 9/3/2024 1348    Acceptance, E,TB, VU,NR by MT at 9/3/2024 0639    Acceptance,  E,TB, VU,DU,NR by  at 9/2/2024 1303    Acceptance, E,TB, VU,NR by  at 8/31/2024 1433                         Point: Home exercise program (In Progress)       Learning Progress Summary             Patient Acceptance, E, NR by AR at 9/3/2024 1348                         Point: Body mechanics (In Progress)       Learning Progress Summary             Patient Acceptance, E, NR by AR at 9/3/2024 1348    Acceptance, E,TB, VU,NR by MT at 9/3/2024 0639    Acceptance, E,TB, VU,DU,NR by CS at 9/2/2024 1303                         Point: Precautions (In Progress)       Learning Progress Summary             Patient Acceptance, E, NR by AR at 9/3/2024 1348    Acceptance, E,TB, VU,NR by MT at 9/3/2024 0639    Acceptance, E,TB, VU,DU,NR by CS at 9/2/2024 1303                                         User Key       Initials Effective Dates Name Provider Type Discipline    MT 06/16/21 -  Leah Martínez, RN Registered Nurse Nurse    AR 06/16/21 -  Joan Jackson, PT Physical Therapist PT    ST 09/22/22 -  Sydni Armendariz, PT Physical Therapist PT     09/22/22 -  Dora Dey, PT Physical Therapist PT                  PT Recommendation and Plan     Outcome Evaluation: Impaired balance, activity toleranceand independence w/ mobility during PT today.  Required mod A x2to stand few times w/ RW, due to strong posterior lean and feet sliding; used HHAx2 with B knees  blocked.  Able to take few small shuffling steps chair>bed with mod A x2, cues for sequencing, posterior leand w/ forward flexed posture.   Recommend DC to SNU.     Time Calculation:         PT Charges       Row Name 09/03/24 1343             Time Calculation    Start Time 1325  -AR      Stop Time 1344  -AR      Time Calculation (min) 19 min  -AR      PT Received On 09/03/24  -AR      PT - Next Appointment 09/04/24  -AR                User Key  (r) = Recorded By, (t) = Taken By, (c) = Cosigned By      Initials Name Provider Type    AR Joan Jackson, PT  Physical Therapist                  Therapy Charges for Today       Code Description Service Date Service Provider Modifiers Qty    [id number]  PT THER PROC EA 15 MIN 9/3/2024 Joan Jackson, PT GP 1    [id number] HC PT THER SUPP EA 15 MIN 9/3/2024 Joan Jackson, PT GP 1            PT G-Codes  Outcome Measure Options: AM-PAC 6 Clicks Basic Mobility (PT)  AM-PAC 6 Clicks Score (PT): 11  PT Discharge Summary  Anticipated Discharge Disposition (PT): skilled nursing facility    Joan Jackson PT  9/3/2024

## 2024-09-03 NOTE — DISCHARGE PLACEMENT REQUEST
"Marianna Terrell (89 y.o. Female)       Date of Birth   05/08/1931    Social Security Number       Address   Counts include 234 beds at the [facility] [address]    Home Phone       MRN   [id number]       Zoroastrian   Quaker    Marital Status   Other                            Admission Date   8/31/24    Admission Type   Emergency    Admitting Provider   Zane Osman MD    Attending Provider   Chaka Morfin MD    Department, Room/Bed   [address], 90/1       Discharge Date       Discharge Disposition       Discharge Destination                                 Attending Provider: Chaka Morfin MD    Allergies: No Known Allergies    Isolation: None   Infection: None   Code Status: No CPR    Ht: 162.6 cm (64\")   Wt: 70.1 kg (154 lb 8.7 oz)    Admission Cmt: None   Principal Problem: UTI (urinary tract infection) [N39.0]                   Active Insurance as of 8/31/2024       Primary Coverage       Payor Plan Insurance Group Employer/Plan Group    MEDICARE MEDICARE A ONLY        Payor Plan Address Payor Plan Phone Number Payor Plan Fax Number Effective Dates    [address] [phone]  5/1/1996 - None Entered    [facility] [id number]         Subscriber Name Subscriber Birth Date Member ID       MARIANNA TERRELL 5/8/1931 [id number]               Secondary Coverage       Payor Plan Insurance Group Employer/Plan Group    [id number]       Payor Plan Address Payor Plan Phone Number Payor Plan Fax Number Effective Dates    [address]   1/1/2006 - None Entered    [facility] [id number]         Subscriber Name Subscriber Birth Date Member ID       MARIANNA TERRELL 5/8/1931 [id number]                     Emergency Contacts        (Rel.) Home Phone Work Phone Mobile Phone    Pineda Terrell (Son) [phone] -- [phone]                "

## 2024-09-03 NOTE — CASE MANAGEMENT/SOCIAL WORK
Continued Stay Note  [facility]     Patient Name: Marianna Terrell  MRN: [id number]  Today's Date: 9/3/2024    Admit Date: 8/31/2024    Plan: [facility] pending acceptance.   Discharge Plan       Row Name 09/03/24 1431       Plan    Plan [facility] pending acceptance.    Plan Comments Met with patient at bedside, she asked that I call Pineda Terrell son [phone].  Informed him that therapy is recommending SNF.  Son requested [facility], left message for Elvi.  Son should be able to transport. Will continue to monitor for new or changing discharge needs. Milagros GAVIRIA RN CCP                   Discharge Codes    No documentation.                 Expected Discharge Date and Time       Expected Discharge Date Expected Discharge Time    Sep 4, 2024               Milagros Kaiser RN

## 2024-09-03 NOTE — PLAN OF CARE
Goal Outcome Evaluation:              Outcome Evaluation: Impaired balance, activity toleranceand independence w/ mobility during PT today.  Required mod A x2to stand few times w/ RW, due to strong posterior lean and feet sliding; used HHAx2 with B knees  blocked.  Able to take few small shuffling steps chair>bed with mod A x2, cues for sequencing, posterior leand w/ forward flexed posture.   Recommend DC to SNU.      Anticipated Discharge Disposition (PT): skilled nursing facility

## 2024-09-03 NOTE — PLAN OF CARE
Goal Outcome Evaluation:  Plan of Care Reviewed With: patient        Progress: improving  Outcome Evaluation: VSS, pt up in chair- stand and pivot, worked with PT, falls precautions maintained, able to turn some but needs helps some of the time, can be forgetful, incontinent, speciality bed, saline locked, working on placement, rounding on this pt was completed with nurse and assistant at times

## 2024-09-03 NOTE — DISCHARGE PLACEMENT REQUEST
"Marianna Terrell (89 y.o. Female)       Date of Birth   05/08/1931    Social Security Number       Address   [facility] [address]    Home Phone       MRN   [id number]       Mormonism   Taoist    Marital Status   Other                            Admission Date   8/31/24    Admission Type   Emergency    Admitting Provider   Zane Osman MD    Attending Provider   Chaka Morfin MD    Department, Room/Bed   [address], 90/1       Discharge Date       Discharge Disposition       Discharge Destination                                 Attending Provider: Chaka Morfin MD    Allergies: No Known Allergies    Isolation: None   Infection: None   Code Status: No CPR    Ht: 162.6 cm (64\")   Wt: 70.1 kg (154 lb 8.7 oz)    Admission Cmt: None   Principal Problem: UTI (urinary tract infection) [N39.0]                   Active Insurance as of 8/31/2024       Primary Coverage       Payor Plan Insurance Group Employer/Plan Group    MEDICARE MEDICARE A ONLY        Payor Plan Address Payor Plan Phone Number Payor Plan Fax Number Effective Dates    [address] [phone]  5/1/1996 - None Entered    [facility] [id number]         Subscriber Name Subscriber Birth Date Member ID       MARIANNA TERRELL 5/8/1931 [id number]               Secondary Coverage       Payor Plan Insurance Group Employer/Plan Group    [id number]       Payor Plan Address Payor Plan Phone Number Payor Plan Fax Number Effective Dates    [address]   1/1/2006 - None Entered    [facility] [address]         Subscriber Name Subscriber Birth Date Member ID       MARIANNA TERRELL 5/8/1931 [id number]                     Emergency Contacts        (Rel.) Home Phone Work Phone Mobile Phone    Pineda Terrell (Son) [phone] -- [phone]                "

## 2024-09-03 NOTE — PROGRESS NOTES
"DAILY PROGRESS NOTE  [facility]    Patient Identification:  Name: Marianna Terrell  Age: 89 y.o.  Sex: female  :  1931  MRN: [id number]         Primary Care Physician: Provider, No Known    Subjective:  Interval History: She is very weak.    Objective:    Scheduled Meds:atorvastatin, 10 mg, Oral, Daily  escitalopram, 20 mg, Oral, Nightly  levothyroxine, 75 mcg, Oral, Daily  losartan, 25 mg, Oral, Q24H  metoprolol tartrate, 12.5 mg, Oral, Q12H  sodium chloride, 10 mL, Intravenous, Q12H      Continuous Infusions:     Vital signs in last 24 hours:  Temp:  [97 °F (36.1 °C)-98.2 °F (36.8 °C)] 97 °F (36.1 °C)  Heart Rate:  [68-74] 68  Resp:  [18-22] 18  BP: (123-147)/(57-66) 130/64    Intake/Output:    Intake/Output Summary (Last 24 hours) at 9/3/2024 1257  Last data filed at 2024  Gross per 24 hour   Intake 660 ml   Output --   Net 660 ml       Exam:  /64 (BP Location: Right arm, Patient Position: Lying)   Pulse 68   Temp 97 °F (36.1 °C) (Oral)   Resp 18   Ht 162.6 cm (64\")   Wt 70.1 kg (154 lb 8.7 oz)   SpO2 93%   BMI 26.53 kg/m²     General Appearance:    Alert, cooperative, no distress   Head:    Normocephalic, without obvious abnormality, atraumatic   Eyes:       Throat:   Lips, tongue, gums normal   Neck:   Supple, symmetrical, trachea midline, no JVD   Lungs:     Clear to auscultation bilaterally, respirations unlabored   Chest Wall:    No tenderness or deformity    Heart:    Regular rate and rhythm, S1 and S2 normal, no murmur,no  rub or gallop   Abdomen:     Soft, nontender, bowel sounds active, no masses, no organomegaly    Extremities:   Extremities normal, atraumatic, no cyanosis or edema   Pulses:      Skin:   Skin is warm and dry,  no rashes or palpable lesions   Neurologic:   no focal deficits noted      Lab Results (last 72 hours)       Procedure Component Value Units Date/Time    Urine Culture - Urine, Straight Cath [[id number]]  (Abnormal)  (Susceptibility) " Collected: 08/31/24 0809    Specimen: Urine from Straight Cath Updated: 09/02/24 0308     Urine Culture >100,000 CFU/mL Escherichia coli    Narrative:      Colonization of the urinary tract without infection is common. Treatment is discouraged unless the patient is symptomatic, pregnant, or undergoing an invasive urologic procedure.    Susceptibility        Escherichia coli      TIN      Amoxicillin + Clavulanate Resistant      Ampicillin Resistant      Ampicillin + Sulbactam Intermediate      Cefazolin Susceptible      Cefepime Susceptible      Ceftazidime Susceptible      Ceftriaxone Susceptible      Gentamicin Susceptible      Levofloxacin Susceptible      Nitrofurantoin Susceptible      Piperacillin + Tazobactam Susceptible      Trimethoprim + Sulfamethoxazole Susceptible                           Basic Metabolic Panel [[id number]]  (Abnormal) Collected: 09/01/24 0326    Specimen: Blood from Arm, Left Updated: 09/01/24 0356     Glucose 108 mg/dL      BUN 13 mg/dL      Creatinine 0.61 mg/dL      Sodium 134 mmol/L      Potassium 3.9 mmol/L      Chloride 100 mmol/L      CO2 24.1 mmol/L      Calcium 8.7 mg/dL      BUN/Creatinine Ratio 21.3     Anion Gap 9.9 mmol/L      eGFR 83.5 mL/min/1.73     Narrative:      GFR Normal >60  Chronic Kidney Disease <60  Kidney Failure <15    The GFR formula is only valid for adults with stable renal function between ages 18 and 70.    CBC Auto Differential [[id number]]  (Abnormal) Collected: 09/01/24 0326    Specimen: Blood from Arm, Left Updated: 09/01/24 0339     WBC 10.16 10*3/mm3      RBC 3.82 10*6/mm3      Hemoglobin 12.3 g/dL      Hematocrit 37.3 %      MCV 97.6 fL      MCH 32.2 pg      MCHC 33.0 g/dL      RDW 12.2 %      RDW-SD 43.6 fl      MPV 9.6 fL      Platelets 214 10*3/mm3      Neutrophil % 68.2 %      Lymphocyte % 19.2 %      Monocyte % 8.9 %      Eosinophil % 2.8 %      Basophil % 0.4 %      Immature Grans % 0.5 %      Neutrophils, Absolute 6.94 10*3/mm3       Lymphocytes, Absolute 1.95 10*3/mm3      Monocytes, Absolute 0.90 10*3/mm3      Eosinophils, Absolute 0.28 10*3/mm3      Basophils, Absolute 0.04 10*3/mm3      Immature Grans, Absolute 0.05 10*3/mm3      nRBC 0.0 /100 WBC     TSH Rfx On Abnormal To Free T4 [[id number]]  (Normal) Collected: 08/31/24 0745    Specimen: Blood Updated: 08/31/24 1128     TSH 2.280 uIU/mL     Sedimentation Rate [[id number]]  (Abnormal) Collected: 08/31/24 0745    Specimen: Blood Updated: 08/31/24 1117     Sed Rate 34 mm/hr     Respiratory Panel PCR w/COVID-19(SARS-CoV-2) CONRAD/NICKY/ZULEYKA/PAD/COR/MANDY In-House, NP Swab in UTM/VTM, 2 HR TAT - Swab, Nasopharynx [[id number]]  (Normal) Collected: 08/31/24 0744    Specimen: Swab from Nasopharynx Updated: 08/31/24 0848     ADENOVIRUS, PCR Not Detected     Coronavirus 229E Not Detected     Coronavirus HKU1 Not Detected     Coronavirus NL63 Not Detected     Coronavirus OC43 Not Detected     COVID19 Not Detected     Human Metapneumovirus Not Detected     Human Rhinovirus/Enterovirus Not Detected     Influenza A PCR Not Detected     Influenza B PCR Not Detected     Parainfluenza Virus 1 Not Detected     Parainfluenza Virus 2 Not Detected     Parainfluenza Virus 3 Not Detected     Parainfluenza Virus 4 Not Detected     RSV, PCR Not Detected     Bordetella pertussis pcr Not Detected     Bordetella parapertussis PCR Not Detected     Chlamydophila pneumoniae PCR Not Detected     Mycoplasma pneumo by PCR Not Detected    Narrative:      In the setting of a positive respiratory panel with a viral infection PLUS a negative procalcitonin without other underlying concern for bacterial infection, consider observing off antibiotics or discontinuation of antibiotics and continue supportive care. If the respiratory panel is positive for atypical bacterial infection (Bordetella pertussis, Chlamydophila pneumoniae, or Mycoplasma pneumoniae), consider antibiotic de-escalation to target atypical bacterial infection.     Urinalysis, Microscopic Only - Straight Cath [[id number]]  (Abnormal) Collected: 08/31/24 0809    Specimen: Urine from Straight Cath Updated: 08/31/24 0839     RBC, UA 3-5 /HPF      WBC, UA Too Numerous to Count /HPF      Bacteria, UA 4+ /HPF      Squamous Epithelial Cells, UA 0-2 /HPF      Hyaline Casts, UA 3-6 /LPF      Methodology Automated Microscopy    Urinalysis With Culture If Indicated - Straight Cath [[id number]]  (Abnormal) Collected: 08/31/24 0809    Specimen: Urine from Straight Cath Updated: 08/31/24 0827     Color, UA Yellow     Appearance, UA Turbid     pH, UA 7.5     Specific Gravity, UA 1.016     Glucose, UA Negative     Ketones, UA Negative     Bilirubin, UA Negative     Blood, UA Small (1+)     Protein, UA Trace     Leuk Esterase, UA Large (3+)     Nitrite, UA Positive     Urobilinogen, UA 1.0 E.U./dL    Narrative:      In absence of clinical symptoms, the presence of pyuria, bacteria, and/or nitrites on the urinalysis result does not correlate with infection.    Single High Sensitivity Troponin T [[id number]]  (Abnormal) Collected: 08/31/24 0745    Specimen: Blood Updated: 08/31/24 0823     HS Troponin T 22 ng/L     Narrative:      High Sensitive Troponin T Reference Range:  <14.0 ng/L- Negative Female for AMI  <22.0 ng/L- Negative Male for AMI  >=14 - Abnormal Female indicating possible myocardial injury.  >=22 - Abnormal Male indicating possible myocardial injury.   Clinicians would have to utilize clinical acumen, EKG, Troponin, and serial changes to determine if it is an Acute Myocardial Infarction or myocardial injury due to an underlying chronic condition.         BNP [[id number]]  (Normal) Collected: 08/31/24 0745    Specimen: Blood Updated: 08/31/24 0823     proBNP 386.0 pg/mL     Narrative:      This assay is used as an aid in the diagnosis of individuals suspected of having heart failure. It can be used as an aid in the diagnosis of acute decompensated heart failure (ADHF) in patients  "presenting with signs and symptoms of ADHF to the emergency department (ED). In addition, NT-proBNP of <300 pg/mL indicates ADHF is not likely.    Age Range Result Interpretation  NT-proBNP Concentration (pg/mL:      <50             Positive            >450                   Gray                 300-450                    Negative             <300    50-75           Positive            >900                  Gray                300-900                  Negative            <300      >75             Positive            >1800                  Gray                300-1800                  Negative            <300    Procalcitonin [[id number]]  (Normal) Collected: 08/31/24 0745    Specimen: Blood Updated: 08/31/24 0823     Procalcitonin 0.05 ng/mL     Narrative:      As a Marker for Sepsis (Non-Neonates):    1. <0.5 ng/mL represents a low risk of severe sepsis and/or septic shock.  2. >2 ng/mL represents a high risk of severe sepsis and/or septic shock.    As a Marker for Lower Respiratory Tract Infections that require antibiotic therapy:    PCT on Admission    Antibiotic Therapy       6-12 Hrs later    >0.5                Strongly Recommended  >0.25 - <0.5        Recommended   0.1 - 0.25          Discouraged              Remeasure/reassess PCT  <0.1                Strongly Discouraged     Remeasure/reassess PCT    As 28 day mortality risk marker: \"Change in Procalcitonin Result\" (>80% or <=80%) if Day 0 (or Day 1) and Day 4 values are available. Refer to http://www.[facility]-pct-calculator.com    Change in PCT <=80%  A decrease of PCT levels below or equal to 80% defines a positive change in PCT test result representing a higher risk for 28-day all-cause mortality of patients diagnosed with severe sepsis for septic shock.    Change in PCT >80%  A decrease of PCT levels of more than 80% defines a negative change in PCT result representing a lower risk for 28-day all-cause mortality of patients diagnosed with severe sepsis or " septic shock.       Comprehensive Metabolic Panel [[id number]]  (Abnormal) Collected: 08/31/24 0745    Specimen: Blood Updated: 08/31/24 0817     Glucose 131 mg/dL      BUN 14 mg/dL      Creatinine 0.59 mg/dL      Sodium 137 mmol/L      Potassium 4.0 mmol/L      Chloride 102 mmol/L      CO2 25.0 mmol/L      Calcium 9.1 mg/dL      Total Protein 6.7 g/dL      Albumin 4.1 g/dL      ALT (SGPT) 11 U/L      AST (SGOT) 9 U/L      Alkaline Phosphatase 59 U/L      Total Bilirubin 1.0 mg/dL      Globulin 2.6 gm/dL      A/G Ratio 1.6 g/dL      BUN/Creatinine Ratio 23.7     Anion Gap 10.0 mmol/L      eGFR 84.2 mL/min/1.73     Narrative:      GFR Normal >60  Chronic Kidney Disease <60  Kidney Failure <15    The GFR formula is only valid for adults with stable renal function between ages 18 and 70.    Lactic Acid, Plasma [[id number]]  (Normal) Collected: 08/31/24 0745    Specimen: Blood Updated: 08/31/24 0814     Lactate 1.1 mmol/L     CBC & Differential [[id number]]  (Abnormal) Collected: 08/31/24 0745    Specimen: Blood Updated: 08/31/24 0809    Narrative:      The following orders were created for panel order CBC & Differential.  Procedure                               Abnormality         Status                     ---------                               -----------         ------                     CBC Auto Differential[[id number]]        Abnormal            Final result                 Please view results for these tests on the individual orders.    CBC Auto Differential [[id number]]  (Abnormal) Collected: 08/31/24 0745    Specimen: Blood Updated: 08/31/24 0809     WBC 11.00 10*3/mm3      RBC 4.09 10*6/mm3      Hemoglobin 13.1 g/dL      Hematocrit 39.7 %      MCV 97.1 fL      MCH 32.0 pg      MCHC 33.0 g/dL      RDW 11.9 %      RDW-SD 42.5 fl      MPV 9.9 fL      Platelets 217 10*3/mm3      Neutrophil % 74.1 %      Lymphocyte % 16.3 %      Monocyte % 7.4 %      Eosinophil % 1.2 %      Basophil % 0.4 %      Immature Grans % 0.6 %  "     Neutrophils, Absolute 8.16 10*3/mm3      Lymphocytes, Absolute 1.79 10*3/mm3      Monocytes, Absolute 0.81 10*3/mm3      Eosinophils, Absolute 0.13 10*3/mm3      Basophils, Absolute 0.04 10*3/mm3      Immature Grans, Absolute 0.07 10*3/mm3      nRBC 0.0 /100 WBC           Data Review:  Results from last 7 days   Lab Units 09/03/24  0430 09/01/24  0326 08/31/24  0745   SODIUM mmol/L 137 134* 137   POTASSIUM mmol/L 4.1 3.9 4.0   CHLORIDE mmol/L 105 100 102   CO2 mmol/L 24.0 24.1 25.0   BUN mg/dL 16 13 14   CREATININE mg/dL 0.75 0.61 0.59   GLUCOSE mg/dL 98 108* 131*   CALCIUM mg/dL 8.4 8.7 9.1     Results from last 7 days   Lab Units 09/03/24  0430 09/01/24  0326 08/31/24  0745   WBC 10*3/mm3 8.87 10.16 11.00*   HEMOGLOBIN g/dL 12.2 12.3 13.1   HEMATOCRIT % 35.9 37.3 39.7   PLATELETS 10*3/mm3 206 214 217     Results from last 7 days   Lab Units 08/31/24  0745   TSH uIU/mL 2.280         Lab Results   Lab Value Date/Time    TROPONINT 22 (H) 08/31/2024 0745    TROPONINT 22 (H) 06/29/2024 0536    TROPONINT 23 (H) 06/29/2024 0123    TROPONINT 23 (H) 06/28/2024 2319         Results from last 7 days   Lab Units 08/31/24  0745   ALK PHOS U/L 59   BILIRUBIN mg/dL 1.0   ALT (SGPT) U/L 11   AST (SGOT) U/L 9     Results from last 7 days   Lab Units 08/31/24  0745   TSH uIU/mL 2.280         No results found for: \"POCGLU\"        Past Medical History:   Diagnosis Date    Anxiety     Chronic kidney disease (CKD), stage IV (severe)     Dementia 01-22    Difficulty walking 01-22    High cholesterol     HL (hearing loss) 01-22    Hypothyroid        Assessment:  Active Hospital Problems    Diagnosis  POA    **UTI (urinary tract infection) [N39.0]  Yes    CKD (chronic kidney disease) [N18.9]  Yes    Dementia [F03.90]  Yes    Hyperlipidemia [E78.5]  Yes    Hypothyroidism (acquired) [E03.9]  Yes      Resolved Hospital Problems   No resolved problems to display.       Plan:  Continue with current medications.  Finished antibiotics for " UTI.  DC planning.  PT felt that she probably should go to a skilled nursing facility for rehab.  Discussed with CCP.  Follow-up on labs and cultures.    Chaka Morfin MD  9/3/2024  12:57 EDT

## 2024-09-04 LAB
ANION GAP SERPL CALCULATED.3IONS-SCNC: 10.7 MMOL/L (ref 5–15)
BASOPHILS # BLD AUTO: 0.07 10*3/MM3 (ref 0–0.2)
BASOPHILS NFR BLD AUTO: 0.7 % (ref 0–1.5)
BUN SERPL-MCNC: 18 MG/DL (ref 8–23)
BUN/CREAT SERPL: 24.7 (ref 7–25)
CALCIUM SPEC-SCNC: 8.7 MG/DL (ref 8.2–9.6)
CHLORIDE SERPL-SCNC: 105 MMOL/L (ref 98–107)
CO2 SERPL-SCNC: 22.3 MMOL/L (ref 22–29)
CREAT SERPL-MCNC: 0.73 MG/DL (ref 0.57–1)
DEPRECATED RDW RBC AUTO: 42.2 FL (ref 37–54)
EGFRCR SERPLBLD CKD-EPI 2021: 76.8 ML/MIN/1.73
EOSINOPHIL # BLD AUTO: 0.23 10*3/MM3 (ref 0–0.4)
EOSINOPHIL NFR BLD AUTO: 2.4 % (ref 0.3–6.2)
ERYTHROCYTE [DISTWIDTH] IN BLOOD BY AUTOMATED COUNT: 12 % (ref 12.3–15.4)
GLUCOSE SERPL-MCNC: 100 MG/DL (ref 65–99)
HCT VFR BLD AUTO: 37.7 % (ref 34–46.6)
HGB BLD-MCNC: 12.8 G/DL (ref 12–15.9)
IMM GRANULOCYTES # BLD AUTO: 0.09 10*3/MM3 (ref 0–0.05)
IMM GRANULOCYTES NFR BLD AUTO: 0.9 % (ref 0–0.5)
LYMPHOCYTES # BLD AUTO: 2.08 10*3/MM3 (ref 0.7–3.1)
LYMPHOCYTES NFR BLD AUTO: 21.7 % (ref 19.6–45.3)
MCH RBC QN AUTO: 33.1 PG (ref 26.6–33)
MCHC RBC AUTO-ENTMCNC: 34 G/DL (ref 31.5–35.7)
MCV RBC AUTO: 97.4 FL (ref 79–97)
MONOCYTES # BLD AUTO: 1.12 10*3/MM3 (ref 0.1–0.9)
MONOCYTES NFR BLD AUTO: 11.7 % (ref 5–12)
NEUTROPHILS NFR BLD AUTO: 6.01 10*3/MM3 (ref 1.7–7)
NEUTROPHILS NFR BLD AUTO: 62.6 % (ref 42.7–76)
NRBC BLD AUTO-RTO: 0 /100 WBC (ref 0–0.2)
PLATELET # BLD AUTO: 218 10*3/MM3 (ref 140–450)
PMV BLD AUTO: 9.8 FL (ref 6–12)
POTASSIUM SERPL-SCNC: 4.4 MMOL/L (ref 3.5–5.2)
RBC # BLD AUTO: 3.87 10*6/MM3 (ref 3.77–5.28)
SODIUM SERPL-SCNC: 138 MMOL/L (ref 136–145)
WBC NRBC COR # BLD AUTO: 9.6 10*3/MM3 (ref 3.4–10.8)

## 2024-09-04 PROCEDURE — 85025 COMPLETE CBC W/AUTO DIFF WBC: CPT | Performed by: HOSPITALIST

## 2024-09-04 PROCEDURE — 80048 BASIC METABOLIC PNL TOTAL CA: CPT | Performed by: HOSPITALIST

## 2024-09-04 RX ADMIN — METOPROLOL TARTRATE 12.5 MG: 25 TABLET, FILM COATED ORAL at 09:25

## 2024-09-04 RX ADMIN — LEVOTHYROXINE SODIUM 75 MCG: 75 TABLET ORAL at 09:25

## 2024-09-04 RX ADMIN — LOSARTAN POTASSIUM 25 MG: 25 TABLET, FILM COATED ORAL at 09:25

## 2024-09-04 RX ADMIN — ESCITALOPRAM 20 MG: 20 TABLET, FILM COATED ORAL at 20:58

## 2024-09-04 RX ADMIN — SENNOSIDES AND DOCUSATE SODIUM 2 TABLET: 50; 8.6 TABLET ORAL at 16:53

## 2024-09-04 RX ADMIN — ATORVASTATIN CALCIUM 10 MG: 20 TABLET, FILM COATED ORAL at 09:25

## 2024-09-04 RX ADMIN — Medication 10 ML: at 20:59

## 2024-09-04 RX ADMIN — ACETAMINOPHEN 325MG 650 MG: 325 TABLET ORAL at 20:58

## 2024-09-04 RX ADMIN — METOPROLOL TARTRATE 12.5 MG: 25 TABLET, FILM COATED ORAL at 20:58

## 2024-09-04 RX ADMIN — Medication 10 ML: at 09:26

## 2024-09-04 NOTE — PROGRESS NOTES
"DAILY PROGRESS NOTE  [facility]    Patient Identification:  Name: Marianna Terrell  Age: 89 y.o.  Sex: female  :  1931  MRN: [id number]         Primary Care Physician: Provider, No Known    Subjective:  Interval History: She is very weak.    Objective:    Scheduled Meds:atorvastatin, 10 mg, Oral, Daily  escitalopram, 20 mg, Oral, Nightly  levothyroxine, 75 mcg, Oral, Daily  losartan, 25 mg, Oral, Q24H  metoprolol tartrate, 12.5 mg, Oral, Q12H  sodium chloride, 10 mL, Intravenous, Q12H      Continuous Infusions:     Vital signs in last 24 hours:  Temp:  [97.1 °F (36.2 °C)-97.6 °F (36.4 °C)] 97.6 °F (36.4 °C)  Heart Rate:  [60-78] 66  Resp:  [16-18] 18  BP: (113-140)/(52-65) 113/52    Intake/Output:    Intake/Output Summary (Last 24 hours) at 2024 1503  Last data filed at 2024 1335  Gross per 24 hour   Intake 960 ml   Output --   Net 960 ml       Exam:  /52 (BP Location: Right arm, Patient Position: Lying)   Pulse 66   Temp 97.6 °F (36.4 °C) (Oral)   Resp 18   Ht 162.6 cm (64\")   Wt 70.1 kg (154 lb 8.7 oz)   SpO2 94%   BMI 26.53 kg/m²     General Appearance:    Alert, cooperative, no distress   Head:    Normocephalic, without obvious abnormality, atraumatic   Eyes:       Throat:   Lips, tongue, gums normal   Neck:   Supple, symmetrical, trachea midline, no JVD   Lungs:     Clear to auscultation bilaterally, respirations unlabored   Chest Wall:    No tenderness or deformity    Heart:    Regular rate and rhythm, S1 and S2 normal, no murmur,no  rub or gallop   Abdomen:     Soft, nontender, bowel sounds active, no masses, no organomegaly    Extremities:   Extremities normal, atraumatic, no cyanosis or edema   Pulses:      Skin:   Skin is warm and dry,  no rashes or palpable lesions   Neurologic:   no focal deficits noted      Lab Results (last 72 hours)       Procedure Component Value Units Date/Time    Urine Culture - Urine, Straight Cath [[id number]]  (Abnormal)  (Susceptibility) " Collected: 08/31/24 0809    Specimen: Urine from Straight Cath Updated: 09/02/24 0308     Urine Culture >100,000 CFU/mL Escherichia coli    Narrative:      Colonization of the urinary tract without infection is common. Treatment is discouraged unless the patient is symptomatic, pregnant, or undergoing an invasive urologic procedure.    Susceptibility        Escherichia coli      TIN      Amoxicillin + Clavulanate Resistant      Ampicillin Resistant      Ampicillin + Sulbactam Intermediate      Cefazolin Susceptible      Cefepime Susceptible      Ceftazidime Susceptible      Ceftriaxone Susceptible      Gentamicin Susceptible      Levofloxacin Susceptible      Nitrofurantoin Susceptible      Piperacillin + Tazobactam Susceptible      Trimethoprim + Sulfamethoxazole Susceptible                           Basic Metabolic Panel [[id number]]  (Abnormal) Collected: 09/01/24 0326    Specimen: Blood from Arm, Left Updated: 09/01/24 0356     Glucose 108 mg/dL      BUN 13 mg/dL      Creatinine 0.61 mg/dL      Sodium 134 mmol/L      Potassium 3.9 mmol/L      Chloride 100 mmol/L      CO2 24.1 mmol/L      Calcium 8.7 mg/dL      BUN/Creatinine Ratio 21.3     Anion Gap 9.9 mmol/L      eGFR 83.5 mL/min/1.73     Narrative:      GFR Normal >60  Chronic Kidney Disease <60  Kidney Failure <15    The GFR formula is only valid for adults with stable renal function between ages 18 and 70.    CBC Auto Differential [[id number]]  (Abnormal) Collected: 09/01/24 0326    Specimen: Blood from Arm, Left Updated: 09/01/24 0339     WBC 10.16 10*3/mm3      RBC 3.82 10*6/mm3      Hemoglobin 12.3 g/dL      Hematocrit 37.3 %      MCV 97.6 fL      MCH 32.2 pg      MCHC 33.0 g/dL      RDW 12.2 %      RDW-SD 43.6 fl      MPV 9.6 fL      Platelets 214 10*3/mm3      Neutrophil % 68.2 %      Lymphocyte % 19.2 %      Monocyte % 8.9 %      Eosinophil % 2.8 %      Basophil % 0.4 %      Immature Grans % 0.5 %      Neutrophils, Absolute 6.94 10*3/mm3       Lymphocytes, Absolute 1.95 10*3/mm3      Monocytes, Absolute 0.90 10*3/mm3      Eosinophils, Absolute 0.28 10*3/mm3      Basophils, Absolute 0.04 10*3/mm3      Immature Grans, Absolute 0.05 10*3/mm3      nRBC 0.0 /100 WBC     TSH Rfx On Abnormal To Free T4 [[id number]]  (Normal) Collected: 08/31/24 0745    Specimen: Blood Updated: 08/31/24 1128     TSH 2.280 uIU/mL     Sedimentation Rate [[id number]]  (Abnormal) Collected: 08/31/24 0745    Specimen: Blood Updated: 08/31/24 1117     Sed Rate 34 mm/hr     Respiratory Panel PCR w/COVID-19(SARS-CoV-2) CONRAD/NICKY/ZULEYKA/PAD/COR/MANDY In-House, NP Swab in UTM/VTM, 2 HR TAT - Swab, Nasopharynx [[id number]]  (Normal) Collected: 08/31/24 0744    Specimen: Swab from Nasopharynx Updated: 08/31/24 0848     ADENOVIRUS, PCR Not Detected     Coronavirus 229E Not Detected     Coronavirus HKU1 Not Detected     Coronavirus NL63 Not Detected     Coronavirus OC43 Not Detected     COVID19 Not Detected     Human Metapneumovirus Not Detected     Human Rhinovirus/Enterovirus Not Detected     Influenza A PCR Not Detected     Influenza B PCR Not Detected     Parainfluenza Virus 1 Not Detected     Parainfluenza Virus 2 Not Detected     Parainfluenza Virus 3 Not Detected     Parainfluenza Virus 4 Not Detected     RSV, PCR Not Detected     Bordetella pertussis pcr Not Detected     Bordetella parapertussis PCR Not Detected     Chlamydophila pneumoniae PCR Not Detected     Mycoplasma pneumo by PCR Not Detected    Narrative:      In the setting of a positive respiratory panel with a viral infection PLUS a negative procalcitonin without other underlying concern for bacterial infection, consider observing off antibiotics or discontinuation of antibiotics and continue supportive care. If the respiratory panel is positive for atypical bacterial infection (Bordetella pertussis, Chlamydophila pneumoniae, or Mycoplasma pneumoniae), consider antibiotic de-escalation to target atypical bacterial infection.     Urinalysis, Microscopic Only - Straight Cath [[id number]]  (Abnormal) Collected: 08/31/24 0809    Specimen: Urine from Straight Cath Updated: 08/31/24 0839     RBC, UA 3-5 /HPF      WBC, UA Too Numerous to Count /HPF      Bacteria, UA 4+ /HPF      Squamous Epithelial Cells, UA 0-2 /HPF      Hyaline Casts, UA 3-6 /LPF      Methodology Automated Microscopy    Urinalysis With Culture If Indicated - Straight Cath [[id number]]  (Abnormal) Collected: 08/31/24 0809    Specimen: Urine from Straight Cath Updated: 08/31/24 0827     Color, UA Yellow     Appearance, UA Turbid     pH, UA 7.5     Specific Gravity, UA 1.016     Glucose, UA Negative     Ketones, UA Negative     Bilirubin, UA Negative     Blood, UA Small (1+)     Protein, UA Trace     Leuk Esterase, UA Large (3+)     Nitrite, UA Positive     Urobilinogen, UA 1.0 E.U./dL    Narrative:      In absence of clinical symptoms, the presence of pyuria, bacteria, and/or nitrites on the urinalysis result does not correlate with infection.    Single High Sensitivity Troponin T [[id number]]  (Abnormal) Collected: 08/31/24 0745    Specimen: Blood Updated: 08/31/24 0823     HS Troponin T 22 ng/L     Narrative:      High Sensitive Troponin T Reference Range:  <14.0 ng/L- Negative Female for AMI  <22.0 ng/L- Negative Male for AMI  >=14 - Abnormal Female indicating possible myocardial injury.  >=22 - Abnormal Male indicating possible myocardial injury.   Clinicians would have to utilize clinical acumen, EKG, Troponin, and serial changes to determine if it is an Acute Myocardial Infarction or myocardial injury due to an underlying chronic condition.         BNP [[id number]]  (Normal) Collected: 08/31/24 0745    Specimen: Blood Updated: 08/31/24 0823     proBNP 386.0 pg/mL     Narrative:      This assay is used as an aid in the diagnosis of individuals suspected of having heart failure. It can be used as an aid in the diagnosis of acute decompensated heart failure (ADHF) in patients  "presenting with signs and symptoms of ADHF to the emergency department (ED). In addition, NT-proBNP of <300 pg/mL indicates ADHF is not likely.    Age Range Result Interpretation  NT-proBNP Concentration (pg/mL:      <50             Positive            >450                   Gray                 300-450                    Negative             <300    50-75           Positive            >900                  Gray                300-900                  Negative            <300      >75             Positive            >1800                  Gray                300-1800                  Negative            <300    Procalcitonin [[id number]]  (Normal) Collected: 08/31/24 0745    Specimen: Blood Updated: 08/31/24 0823     Procalcitonin 0.05 ng/mL     Narrative:      As a Marker for Sepsis (Non-Neonates):    1. <0.5 ng/mL represents a low risk of severe sepsis and/or septic shock.  2. >2 ng/mL represents a high risk of severe sepsis and/or septic shock.    As a Marker for Lower Respiratory Tract Infections that require antibiotic therapy:    PCT on Admission    Antibiotic Therapy       6-12 Hrs later    >0.5                Strongly Recommended  >0.25 - <0.5        Recommended   0.1 - 0.25          Discouraged              Remeasure/reassess PCT  <0.1                Strongly Discouraged     Remeasure/reassess PCT    As 28 day mortality risk marker: \"Change in Procalcitonin Result\" (>80% or <=80%) if Day 0 (or Day 1) and Day 4 values are available. Refer to http://www.[employer] [facility]-pct-calculator.com    Change in PCT <=80%  A decrease of PCT levels below or equal to 80% defines a positive change in PCT test result representing a higher risk for 28-day all-cause mortality of patients diagnosed with severe sepsis for septic shock.    Change in PCT >80%  A decrease of PCT levels of more than 80% defines a negative change in PCT result representing a lower risk for 28-day all-cause mortality of patients diagnosed with severe sepsis or " septic shock.       Comprehensive Metabolic Panel [[id number]]  (Abnormal) Collected: 08/31/24 0745    Specimen: Blood Updated: 08/31/24 0817     Glucose 131 mg/dL      BUN 14 mg/dL      Creatinine 0.59 mg/dL      Sodium 137 mmol/L      Potassium 4.0 mmol/L      Chloride 102 mmol/L      CO2 25.0 mmol/L      Calcium 9.1 mg/dL      Total Protein 6.7 g/dL      Albumin 4.1 g/dL      ALT (SGPT) 11 U/L      AST (SGOT) 9 U/L      Alkaline Phosphatase 59 U/L      Total Bilirubin 1.0 mg/dL      Globulin 2.6 gm/dL      A/G Ratio 1.6 g/dL      BUN/Creatinine Ratio 23.7     Anion Gap 10.0 mmol/L      eGFR 84.2 mL/min/1.73     Narrative:      GFR Normal >60  Chronic Kidney Disease <60  Kidney Failure <15    The GFR formula is only valid for adults with stable renal function between ages 18 and 70.    Lactic Acid, Plasma [[id number]]  (Normal) Collected: 08/31/24 0745    Specimen: Blood Updated: 08/31/24 0814     Lactate 1.1 mmol/L     CBC & Differential [[id number]]  (Abnormal) Collected: 08/31/24 0745    Specimen: Blood Updated: 08/31/24 0809    Narrative:      The following orders were created for panel order CBC & Differential.  Procedure                               Abnormality         Status                     ---------                               -----------         ------                     CBC Auto Differential[[id number]]        Abnormal            Final result                 Please view results for these tests on the individual orders.    CBC Auto Differential [[id number]]  (Abnormal) Collected: 08/31/24 0745    Specimen: Blood Updated: 08/31/24 0809     WBC 11.00 10*3/mm3      RBC 4.09 10*6/mm3      Hemoglobin 13.1 g/dL      Hematocrit 39.7 %      MCV 97.1 fL      MCH 32.0 pg      MCHC 33.0 g/dL      RDW 11.9 %      RDW-SD 42.5 fl      MPV 9.9 fL      Platelets 217 10*3/mm3      Neutrophil % 74.1 %      Lymphocyte % 16.3 %      Monocyte % 7.4 %      Eosinophil % 1.2 %      Basophil % 0.4 %      Immature Grans % 0.6 %  "     Neutrophils, Absolute 8.16 10*3/mm3      Lymphocytes, Absolute 1.79 10*3/mm3      Monocytes, Absolute 0.81 10*3/mm3      Eosinophils, Absolute 0.13 10*3/mm3      Basophils, Absolute 0.04 10*3/mm3      Immature Grans, Absolute 0.07 10*3/mm3      nRBC 0.0 /100 WBC           Data Review:  Results from last 7 days   Lab Units 09/04/24  0344 09/03/24  0430 09/01/24  0326   SODIUM mmol/L 138 137 134*   POTASSIUM mmol/L 4.4 4.1 3.9   CHLORIDE mmol/L 105 105 100   CO2 mmol/L 22.3 24.0 24.1   BUN mg/dL 18 16 13   CREATININE mg/dL 0.73 0.75 0.61   GLUCOSE mg/dL 100* 98 108*   CALCIUM mg/dL 8.7 8.4 8.7     Results from last 7 days   Lab Units 09/04/24  0344 09/03/24  0430 09/01/24  0326   WBC 10*3/mm3 9.60 8.87 10.16   HEMOGLOBIN g/dL 12.8 12.2 12.3   HEMATOCRIT % 37.7 35.9 37.3   PLATELETS 10*3/mm3 218 206 214     Results from last 7 days   Lab Units 08/31/24  0745   TSH uIU/mL 2.280         Lab Results   Lab Value Date/Time    TROPONINT 22 (H) 08/31/2024 0745    TROPONINT 22 (H) 06/29/2024 0536    TROPONINT 23 (H) 06/29/2024 0123    TROPONINT 23 (H) 06/28/2024 2319         Results from last 7 days   Lab Units 08/31/24  0745   ALK PHOS U/L 59   BILIRUBIN mg/dL 1.0   ALT (SGPT) U/L 11   AST (SGOT) U/L 9     Results from last 7 days   Lab Units 08/31/24  0745   TSH uIU/mL 2.280         No results found for: \"POCGLU\"        Past Medical History:   Diagnosis Date    Anxiety     Chronic kidney disease (CKD), stage IV (severe)     Dementia 01-22    Difficulty walking 01-22    High cholesterol     HL (hearing loss) 01-22    Hypothyroid        Assessment:  Active Hospital Problems    Diagnosis  POA    **UTI (urinary tract infection) [N39.0]  Yes    CKD (chronic kidney disease) [N18.9]  Yes    Dementia [F03.90]  Yes    Hyperlipidemia [E78.5]  Yes    Hypothyroidism (acquired) [E03.9]  Yes      Resolved Hospital Problems   No resolved problems to display.       Plan:  Continue with current medications.  Finished antibiotics for " UTI.  DC planning.  PT felt that she probably should go to a skilled nursing facility for rehab.  SNU tomorrow.  Discussed with CCP.  Follow-up on labs and cultures.    Chaka oMrfin MD  9/4/2024  15:03 EDT

## 2024-09-04 NOTE — PLAN OF CARE
Goal Outcome Evaluation:  Plan of Care Reviewed With: patient        Progress: improving  Outcome Evaluation: vss, tylenol for headache given, turned to sides, bed alarm for safety, 02 sat drop when sleeping place on 2L NC. continue to monitor the pt,

## 2024-09-04 NOTE — CASE MANAGEMENT/SOCIAL WORK
Continued Stay Note  [facility]     Patient Name: Marianna Terrell  MRN: [id number]  Today's Date: 9/4/2024    Admit Date: 8/31/2024    Plan: [facility], bed available 9/5.   Discharge Plan       Row Name 09/04/24 1151       Plan    Plan [facility], bed available 9/5.    Patient/Family in Agreement with Plan yes    Plan Comments Spoke with Leny/[facility] who stated patient is accepted at [facility] and bed is available 9/5. Son may be able to transport at [place]. CCP to follow. Buster HARRIS RN                   Discharge Codes    No documentation.                 Expected Discharge Date and Time       Expected Discharge Date Expected Discharge Time    Sep 6, 2024               Buster Mak RN

## 2024-09-04 NOTE — PLAN OF CARE
Goal Outcome Evaluation:  Plan of Care Reviewed With: patient        Progress: improving  Outcome Evaluation: VSS, turning Q2, friend came to visit, falls precautions maintained, incontinent, no c/o of pain, stool softener given, possible d/c tomorrow

## 2024-09-05 VITALS
WEIGHT: 154.54 LBS | HEIGHT: 64 IN | RESPIRATION RATE: 16 BRPM | BODY MASS INDEX: 26.38 KG/M2 | DIASTOLIC BLOOD PRESSURE: 72 MMHG | HEART RATE: 63 BPM | SYSTOLIC BLOOD PRESSURE: 131 MMHG | OXYGEN SATURATION: 91 % | TEMPERATURE: 98.1 F

## 2024-09-05 PROBLEM — N39.0 UTI (URINARY TRACT INFECTION): Status: RESOLVED | Noted: 2024-06-29 | Resolved: 2024-09-05

## 2024-09-05 LAB
ANION GAP SERPL CALCULATED.3IONS-SCNC: 7.9 MMOL/L (ref 5–15)
BASOPHILS # BLD AUTO: 0.06 10*3/MM3 (ref 0–0.2)
BASOPHILS NFR BLD AUTO: 0.6 % (ref 0–1.5)
BUN SERPL-MCNC: 20 MG/DL (ref 8–23)
BUN/CREAT SERPL: 27 (ref 7–25)
CALCIUM SPEC-SCNC: 9 MG/DL (ref 8.2–9.6)
CHLORIDE SERPL-SCNC: 102 MMOL/L (ref 98–107)
CO2 SERPL-SCNC: 24.1 MMOL/L (ref 22–29)
CREAT SERPL-MCNC: 0.74 MG/DL (ref 0.57–1)
DEPRECATED RDW RBC AUTO: 41.5 FL (ref 37–54)
EGFRCR SERPLBLD CKD-EPI 2021: 75.5 ML/MIN/1.73
EOSINOPHIL # BLD AUTO: 0.27 10*3/MM3 (ref 0–0.4)
EOSINOPHIL NFR BLD AUTO: 2.7 % (ref 0.3–6.2)
ERYTHROCYTE [DISTWIDTH] IN BLOOD BY AUTOMATED COUNT: 11.7 % (ref 12.3–15.4)
GLUCOSE SERPL-MCNC: 109 MG/DL (ref 65–99)
HCT VFR BLD AUTO: 38.8 % (ref 34–46.6)
HGB BLD-MCNC: 13.1 G/DL (ref 12–15.9)
IMM GRANULOCYTES # BLD AUTO: 0.09 10*3/MM3 (ref 0–0.05)
IMM GRANULOCYTES NFR BLD AUTO: 0.9 % (ref 0–0.5)
LYMPHOCYTES # BLD AUTO: 1.96 10*3/MM3 (ref 0.7–3.1)
LYMPHOCYTES NFR BLD AUTO: 19.8 % (ref 19.6–45.3)
MCH RBC QN AUTO: 32.7 PG (ref 26.6–33)
MCHC RBC AUTO-ENTMCNC: 33.8 G/DL (ref 31.5–35.7)
MCV RBC AUTO: 96.8 FL (ref 79–97)
MONOCYTES # BLD AUTO: 0.83 10*3/MM3 (ref 0.1–0.9)
MONOCYTES NFR BLD AUTO: 8.4 % (ref 5–12)
NEUTROPHILS NFR BLD AUTO: 6.7 10*3/MM3 (ref 1.7–7)
NEUTROPHILS NFR BLD AUTO: 67.6 % (ref 42.7–76)
NRBC BLD AUTO-RTO: 0 /100 WBC (ref 0–0.2)
PLATELET # BLD AUTO: 243 10*3/MM3 (ref 140–450)
PMV BLD AUTO: 9.7 FL (ref 6–12)
POTASSIUM SERPL-SCNC: 4.2 MMOL/L (ref 3.5–5.2)
RBC # BLD AUTO: 4.01 10*6/MM3 (ref 3.77–5.28)
SODIUM SERPL-SCNC: 134 MMOL/L (ref 136–145)
WBC NRBC COR # BLD AUTO: 9.91 10*3/MM3 (ref 3.4–10.8)

## 2024-09-05 PROCEDURE — 80048 BASIC METABOLIC PNL TOTAL CA: CPT | Performed by: HOSPITALIST

## 2024-09-05 PROCEDURE — 85025 COMPLETE CBC W/AUTO DIFF WBC: CPT | Performed by: HOSPITALIST

## 2024-09-05 RX ADMIN — LEVOTHYROXINE SODIUM 75 MCG: 75 TABLET ORAL at 08:45

## 2024-09-05 RX ADMIN — ATORVASTATIN CALCIUM 10 MG: 20 TABLET, FILM COATED ORAL at 08:45

## 2024-09-05 RX ADMIN — LOSARTAN POTASSIUM 25 MG: 25 TABLET, FILM COATED ORAL at 08:45

## 2024-09-05 RX ADMIN — Medication 10 ML: at 08:45

## 2024-09-05 RX ADMIN — METOPROLOL TARTRATE 12.5 MG: 25 TABLET, FILM COATED ORAL at 08:45

## 2024-09-05 NOTE — CASE MANAGEMENT/SOCIAL WORK
Continued Stay Note  [facility]     Patient Name: Marianna Terrell  MRN: [id number]  Today's Date: 9/5/2024    Admit Date: 8/31/2024    Plan: [facility] via [facility] van at 1300   Discharge Plan       Row Name 09/05/24 1047       Plan    Plan [facility] via [facility] at 1300    Patient/Family in Agreement with Plan yes    Plan Comments Spoke with pt's son Pineda via phone who will need WC Van transport. Scheduled [facility] van at 1300 due to  wc van being full. Son Pineda agreeable. Packet given to RN                   Discharge Codes    No documentation.                 Expected Discharge Date and Time       Expected Discharge Date Expected Discharge Time    Sep 5, 2024               RENATO Barber

## 2024-09-05 NOTE — PLAN OF CARE
Goal Outcome Evaluation:  Plan of Care Reviewed With: patient        Progress: improving  Outcome Evaluation: vss, tylenol for headache, turned to sides, bed alarm for safety, for discharge to [facility], possible son will bring pt there, conitnue to monitor the pt.

## 2024-09-05 NOTE — NURSING NOTE
Pt discharged to [facility]. Report called. IV removed. Transportation provided by [facility].

## 2024-09-05 NOTE — DISCHARGE PLACEMENT REQUEST
"Marianna Terrell (89 y.o. Female)       Date of Birth   05/08/1931    Social Security Number       Address   [facility] [place] [address]    Home Phone       MRN   [id number]       Mu-ism   Advent    Marital Status   Other                            Admission Date   8/31/24    Admission Type   Emergency    Admitting Provider   Zane Osman MD    Attending Provider   Justin Brown MD    Department, Room/Bed   [address], 90/1       Discharge Date       Discharge Disposition   Skilled Nursing Facility (DC - External)    Discharge Destination                                 Attending Provider: Justin Brown MD    Allergies: No Known Allergies    Isolation: None   Infection: None   Code Status: No CPR    Ht: 162.6 cm (64\")   Wt: 70.1 kg (154 lb 8.7 oz)    Admission Cmt: None   Principal Problem: UTI (urinary tract infection) [N39.0]                   Active Insurance as of 8/31/2024       Primary Coverage       Payor Plan Insurance Group Employer/Plan Group    MEDICARE MEDICARE A ONLY        Payor Plan Address Payor Plan Phone Number Payor Plan Fax Number Effective Dates    [address] [phone]  5/1/1996 - None Entered    [facility] [id number]         Subscriber Name Subscriber Birth Date Member ID       MARIANNA TERRELL 5/8/1931 [id number]               Secondary Coverage       Payor Plan Insurance Group Employer/Plan Group    [id number]       Payor Plan Address Payor Plan Phone Number Payor Plan Fax Number Effective Dates    [address]   1/1/2006 - None Entered    [facility] [id number]         Subscriber Name Subscriber Birth Date Member ID       MARIANNA TERRELL 5/8/1931 [id number]                     Emergency Contacts        (Rel.) Home Phone Work Phone Mobile Phone    Pineda Terrell (Son) [phone] -- [phone]                 Discharge Summary        Justin Brown MD at 09/05/24 0958        "       Patient Name: Marianna Terrell  : 1931  MRN: [id number]    Date of Admission: 2024  Date of Discharge:  2024  Primary Care Physician: Provider, No Known      Chief Complaint:   Headache and Fever      Discharge Diagnoses     Active Hospital Problems    Diagnosis  POA    CKD (chronic kidney disease) [N18.9]  Yes    Dementia [F03.90]  Yes    Hyperlipidemia [E78.5]  Yes    Hypothyroidism (acquired) [E03.9]  Yes      Resolved Hospital Problems    Diagnosis Date Resolved POA    **UTI (urinary tract infection) [N39.0] 2024 Yes        Hospital Course     Ms. Terrell is a 89 y.o. female with a history of hypothyroidism, hyperlipidemia, vascular dementia who presented to [facility] initially complaining of headaches, fevers and chills for a couple of days.  Please see the admitting history and physical for further details.  She was found to have urinary tract infection and hypertensive urgency and was admitted to the hospital for further evaluation and treatment.      She was initially admitted to the observation unit and started on IV ceftriaxone and seen in consultation by neurology who recommended treating her hypertension to help resolve the headache.  Her symptoms resolved rapidly, but after evaluation with physical therapy, it was felt that a stay at subacute rehab was going to be necessary, so she was subsequently admitted to the medicine service.  She completed her course of antibiotics (urine cultures eventually grew E. coli).  She is doing well today, and will be discharged to subacute rehab.  She will need to follow-up with her PCP in a week for a BMP and blood pressure check since she has been started on losartan this admission.    Day of Discharge     Subjective:  No events overnight. No complaints.    Physical Exam:  Temp:  [97.2 °F (36.2 °C)-98.8 °F (37.1 °C)] 98.1 °F (36.7 °C)  Heart Rate:  [63-74] 63  Resp:  [16-18] 16  BP: (106-150)/(55-72) 131/72  Body mass index is  26.53 kg/m².  Physical Exam  Constitutional:       General: She is not in acute distress.     Appearance: She is not toxic-appearing.   Cardiovascular:      Rate and Rhythm: Normal rate and regular rhythm.      Heart sounds: Normal heart sounds.   Pulmonary:      Effort: Pulmonary effort is normal.      Breath sounds: Normal breath sounds.   Abdominal:      General: Bowel sounds are normal.      Palpations: Abdomen is soft.   Musculoskeletal:         General: No tenderness.      Right lower leg: No edema.      Left lower leg: No edema.   Neurological:      Mental Status: She is alert.   Psychiatric:         Mood and Affect: Mood normal.         Behavior: Behavior normal.         Consultants     Consult Orders (all) (From admission, onward)       Start     Ordered    09/01/24 1753  Inpatient Hospitalist Consult  Once        Specialty:  Hospitalist  Provider:  Boogie Robles MD    09/01/24 1752    09/01/24 1247  Inpatient Case Management  Consult  Once        Provider:  (Not yet assigned)    09/01/24 1247    08/31/24 1207  Inpatient Neurology Consult General  Once,   Status:  Canceled        Specialty:  Neurology  Provider:  David Segovia MD    08/31/24 1206    08/31/24 1100  Inpatient Case Management  Consult  Once        Provider:  (Not yet assigned)    08/31/24 1059                  Procedures     Imaging Results (All)       Procedure Component Value Units Date/Time    CT Head Without Contrast [[id number]] Collected: 08/31/24 0824     Updated: 08/31/24 0829    Narrative:      CT HEAD WITHOUT CONTRAST     HISTORY: Acute headache for the past 2 days. Fall sometime last week.     TECHNIQUE:  Head CT includes axial imaging from the base of skull to the  vertex without intravenous contrast. Radiation dose reduction techniques  were utilized, including automated exposure control and exposure  modulation based on body size.     COMPARISON: CT head 08/21/2024, 06/28/2024      FINDINGS: There are no abnormal areas of increased attenuation  intra-axially to suggest hemorrhage. No extra-axial fluid collection is  observed. There is no evidence for mass effect or shift of the midline  structures. There is diffuse cerebral and cerebellar atrophy with  extensive chronic small vessel ischemic white matter change that is  without evidence for change compared to the previous study.       Impression:      Extensive chronic small vessel ischemic white matter change  and atrophy. No evidence for acute intracranial abnormality.     This report was finalized on 8/31/2024 8:26 AM by Isrrael Barnett M.D  on Workstation: [id number]       XR Chest 1 View [[id number]] Collected: 08/31/24 0818     Updated: 08/31/24 0822    Narrative:      XR CHEST 1 VW-        INDICATION: Fever, hypoxia     COMPARISON: Chest radiograph June 28, 2024     TECHNIQUE: 1 view chest     FINDINGS:      Low lung volumes. No opacities seen. No effusions. Stable mediastinum.  Cholecystectomy clips.       Impression:      Low lung volumes without opacities identified.     This report was finalized on 8/31/2024 8:19 AM by Dr. Marco Antonio Thornton M.D on Workstation: [id number]               Pertinent Labs     Results from last 7 days   Lab Units 09/05/24  0628 09/04/24 0344 09/03/24  0430 09/01/24  0326   WBC 10*3/mm3 9.91 9.60 8.87 10.16   HEMOGLOBIN g/dL 13.1 12.8 12.2 12.3   PLATELETS 10*3/mm3 243 218 206 214     Results from last 7 days   Lab Units 09/05/24  0628 09/04/24 0344 09/03/24  0430 09/01/24  0326   SODIUM mmol/L 134* 138 137 134*   POTASSIUM mmol/L 4.2 4.4 4.1 3.9   CHLORIDE mmol/L 102 105 105 100   CO2 mmol/L 24.1 22.3 24.0 24.1   BUN mg/dL 20 18 16 13   CREATININE mg/dL 0.74 0.73 0.75 0.61   GLUCOSE mg/dL 109* 100* 98 108*   Estimated Creatinine Clearance: 45.7 mL/min (by C-G formula based on SCr of 0.74 mg/dL).  Results from last 7 days   Lab Units 08/31/24  0745   ALBUMIN g/dL 4.1   BILIRUBIN mg/dL 1.0   ALK PHOS  "U/L 59   AST (SGOT) U/L 9   ALT (SGPT) U/L 11     Results from last 7 days   Lab Units 09/05/24  0628 09/04/24  0344 09/03/24  0430 09/01/24  0326 08/31/24  0745   CALCIUM mg/dL 9.0 8.7 8.4 8.7 9.1   ALBUMIN g/dL  --   --   --   --  4.1       Results from last 7 days   Lab Units 08/31/24  0745   HSTROP T ng/L 22*   PROBNP pg/mL 386.0           Invalid input(s): \"LDLCALC\"  Results from last 7 days   Lab Units 08/31/24  0809   URINECX  >100,000 CFU/mL Escherichia coli*       Test Results Pending at Discharge       Discharge Details        Discharge Medications        New Medications        Instructions Start Date   losartan 25 MG tablet  Commonly known as: COZAAR   25 mg, Oral, Every 24 Hours Scheduled             Changes to Medications        Instructions Start Date   metoprolol tartrate 25 MG tablet  Commonly known as: LOPRESSOR  What changed: how much to take   12.5 mg, Oral, Every 12 Hours Scheduled             Continue These Medications        Instructions Start Date   albuterol sulfate  (90 Base) MCG/ACT inhaler  Commonly known as: PROVENTIL HFA;VENTOLIN HFA;PROAIR HFA   2 puffs, Inhalation, Every 4 Hours PRN      escitalopram 5 MG tablet  Commonly known as: LEXAPRO   20 mg, Oral, Nightly      levothyroxine 75 MCG tablet  Commonly known as: SYNTHROID, LEVOTHROID   75 mcg, Oral, Daily      simvastatin 20 MG tablet  Commonly known as: ZOCOR   20 mg, Oral, Nightly      VIACTIV CALCIUM PLUS D PO   1 tablet, Oral, Daily, 500 mg micheal and 100 units of vitamin D.                No Known Allergies      Discharge Disposition:  Skilled Nursing Facility (DC - External)    Discharge Diet:  Diet Order   Procedures    Diet: Regular/House; Fluid Consistency: Thin (IDDSI 0)       Discharge Activity:   Activity Instructions       Activity as Tolerated              CODE STATUS:    Code Status and Medical Interventions: No CPR (Do Not Attempt to Resuscitate); Limited Support; No intubation (DNI)   Ordered at: 09/01/24 1941    "  Medical Intervention Limits:    No intubation (DNI)     Level Of Support Discussed With:    Patient     Code Status (Patient has no pulse and is not breathing):    No CPR (Do Not Attempt to Resuscitate)     Medical Interventions (Patient has pulse or is breathing):    Limited Support       No future appointments.  Additional Instructions for the Follow-ups that You Need to Schedule       Ambulatory Referral to Home Health   As directed      Face to Face Visit Date: 9/1/2024   Follow-up provider for Plan of Care?: I treated the patient in an acute care facility and will not continue treatment after discharge.   Follow-up provider: KENZIE ADRIAN [[id number]]   Reason/Clinical Findings: increased weakness   Describe mobility limitations that make leaving home difficult: home bound, cannot drive   Nursing/Therapeutic Services Requested: Physical Therapy   Frequency: 1 Week 1        Call MD With Problems / Concerns   As directed      Instructions: return to the hospital if you experience chest pain, shortness of breath, abdominal pain, nausea, vomiting, fevers, sweats, chills, or worsening of your symptoms    Order Comments: Instructions: return to the hospital if you experience chest pain, shortness of breath, abdominal pain, nausea, vomiting, fevers, sweats, chills, or worsening of your symptoms         Discharge Follow-up with PCP   As directed       Currently Documented PCP:    Provider, No Known    PCP Phone Number:    [phone]     Follow Up Details: 1-2 weeks               Contact information for follow-up providers       Provider, No Known .    Why: 1-2 weeks  Contact information:  [facility] [address]  [phone]                       Contact information for after-discharge care       Destination       [facility] .    Service: Skilled Nursing  Contact information:  [address] [facility] [address] [phone]                     [facility]  Medical Care       [facility]    Service: [facility] Health Services  Contact information:  [address]  [facility] [address] [phone]                                   Additional Instructions for the Follow-ups that You Need to Schedule       Ambulatory Referral to Home Health   As directed      Face to Face Visit Date: 9/1/2024   Follow-up provider for Plan of Care?: I treated the patient in an acute care facility and will not continue treatment after discharge.   Follow-up provider: KENZIE ADRIAN [[id number]]   Reason/Clinical Findings: increased weakness   Describe mobility limitations that make leaving home difficult: home bound, cannot drive   Nursing/Therapeutic Services Requested: Physical Therapy   Frequency: 1 Week 1        Call MD With Problems / Concerns   As directed      Instructions: return to the hospital if you experience chest pain, shortness of breath, abdominal pain, nausea, vomiting, fevers, sweats, chills, or worsening of your symptoms    Order Comments: Instructions: return to the hospital if you experience chest pain, shortness of breath, abdominal pain, nausea, vomiting, fevers, sweats, chills, or worsening of your symptoms         Discharge Follow-up with PCP   As directed       Currently Documented PCP:    Provider, No Known    PCP Phone Number:    [phone]     Follow Up Details: 1-2 weeks            Time Spent on Discharge:  Greater than 30 minutes      Justin Brown MD  [facility]  09/05/24  09:58 EDT               Electronically signed by Justin Brown MD at 09/05/24 1002

## 2024-09-05 NOTE — DISCHARGE SUMMARY
Patient Name: Marianna Terrell  : 1931  MRN: [id number]    Date of Admission: 2024  Date of Discharge:  2024  Primary Care Physician: Provider, No Known      Chief Complaint:   Headache and Fever      Discharge Diagnoses     Active Hospital Problems    Diagnosis  POA    CKD (chronic kidney disease) [N18.9]  Yes    Dementia [F03.90]  Yes    Hyperlipidemia [E78.5]  Yes    Hypothyroidism (acquired) [E03.9]  Yes      Resolved Hospital Problems    Diagnosis Date Resolved POA    **UTI (urinary tract infection) [N39.0] 2024 Yes        Hospital Course     Ms. Terrell is a 89 y.o. female with a history of hypothyroidism, hyperlipidemia, vascular dementia who presented to [facility] initially complaining of headaches, fevers and chills for a couple of days.  Please see the admitting history and physical for further details.  She was found to have urinary tract infection and hypertensive urgency and was admitted to the hospital for further evaluation and treatment.      She was initially admitted to the observation unit and started on IV ceftriaxone and seen in consultation by neurology who recommended treating her hypertension to help resolve the headache.  Her symptoms resolved rapidly, but after evaluation with physical therapy, it was felt that a stay at subacute rehab was going to be necessary, so she was subsequently admitted to the medicine service.  She completed her course of antibiotics (urine cultures eventually grew E. coli).  She is doing well today, and will be discharged to subacute rehab.  She will need to follow-up with her PCP in a week for a BMP and blood pressure check since she has been started on losartan this admission.    Day of Discharge     Subjective:  No events overnight. No complaints.    Physical Exam:  Temp:  [97.2 °F (36.2 °C)-98.8 °F (37.1 °C)] 98.1 °F (36.7 °C)  Heart Rate:  [63-74] 63  Resp:  [16-18] 16  BP: (106-150)/(55-72) 131/72  Body mass index is 26.53  kg/m².  Physical Exam  Constitutional:       General: She is not in acute distress.     Appearance: She is not toxic-appearing.   Cardiovascular:      Rate and Rhythm: Normal rate and regular rhythm.      Heart sounds: Normal heart sounds.   Pulmonary:      Effort: Pulmonary effort is normal.      Breath sounds: Normal breath sounds.   Abdominal:      General: Bowel sounds are normal.      Palpations: Abdomen is soft.   Musculoskeletal:         General: No tenderness.      Right lower leg: No edema.      Left lower leg: No edema.   Neurological:      Mental Status: She is alert.   Psychiatric:         Mood and Affect: Mood normal.         Behavior: Behavior normal.         Consultants     Consult Orders (all) (From admission, onward)       Start     Ordered    09/01/24 1753  Inpatient Hospitalist Consult  Once        Specialty:  Hospitalist  Provider:  Boogie Robles MD    09/01/24 1752    09/01/24 1247  Inpatient Case Management  Consult  Once        Provider:  (Not yet assigned)    09/01/24 1247    08/31/24 1207  Inpatient Neurology Consult General  Once,   Status:  Canceled        Specialty:  Neurology  Provider:  David Segovia MD    08/31/24 1206    08/31/24 1100  Inpatient Case Management  Consult  Once        Provider:  (Not yet assigned)    08/31/24 1059                  Procedures     Imaging Results (All)       Procedure Component Value Units Date/Time    CT Head Without Contrast [[id number]] Collected: 08/31/24 0824     Updated: 08/31/24 0829    Narrative:      CT HEAD WITHOUT CONTRAST     HISTORY: Acute headache for the past 2 days. Fall sometime last week.     TECHNIQUE:  Head CT includes axial imaging from the base of skull to the  vertex without intravenous contrast. Radiation dose reduction techniques  were utilized, including automated exposure control and exposure  modulation based on body size.     COMPARISON: CT head 08/21/2024, 06/28/2024      FINDINGS: There are no abnormal areas of increased attenuation  intra-axially to suggest hemorrhage. No extra-axial fluid collection is  observed. There is no evidence for mass effect or shift of the midline  structures. There is diffuse cerebral and cerebellar atrophy with  extensive chronic small vessel ischemic white matter change that is  without evidence for change compared to the previous study.       Impression:      Extensive chronic small vessel ischemic white matter change  and atrophy. No evidence for acute intracranial abnormality.     This report was finalized on 8/31/2024 8:26 AM by Isrrael Barnett M.D  on Workstation: [id number]       XR Chest 1 View [[id number]] Collected: 08/31/24 0818     Updated: 08/31/24 0822    Narrative:      XR CHEST 1 VW-        INDICATION: Fever, hypoxia     COMPARISON: Chest radiograph June 28, 2024     TECHNIQUE: 1 view chest     FINDINGS:      Low lung volumes. No opacities seen. No effusions. Stable mediastinum.  Cholecystectomy clips.       Impression:      Low lung volumes without opacities identified.     This report was finalized on 8/31/2024 8:19 AM by Dr. Marco Antonio Thornton M.D on Workstation: [id number]               Pertinent Labs     Results from last 7 days   Lab Units 09/05/24  0628 09/04/24 0344 09/03/24  0430 09/01/24  0326   WBC 10*3/mm3 9.91 9.60 8.87 10.16   HEMOGLOBIN g/dL 13.1 12.8 12.2 12.3   PLATELETS 10*3/mm3 243 218 206 214     Results from last 7 days   Lab Units 09/05/24  0628 09/04/24 0344 09/03/24  0430 09/01/24  0326   SODIUM mmol/L 134* 138 137 134*   POTASSIUM mmol/L 4.2 4.4 4.1 3.9   CHLORIDE mmol/L 102 105 105 100   CO2 mmol/L 24.1 22.3 24.0 24.1   BUN mg/dL 20 18 16 13   CREATININE mg/dL 0.74 0.73 0.75 0.61   GLUCOSE mg/dL 109* 100* 98 108*   Estimated Creatinine Clearance: 45.7 mL/min (by C-G formula based on SCr of 0.74 mg/dL).  Results from last 7 days   Lab Units 08/31/24  0745   ALBUMIN g/dL 4.1   BILIRUBIN mg/dL 1.0   ALK PHOS  "U/L 59   AST (SGOT) U/L 9   ALT (SGPT) U/L 11     Results from last 7 days   Lab Units 09/05/24  0628 09/04/24  0344 09/03/24  0430 09/01/24  0326 08/31/24  0745   CALCIUM mg/dL 9.0 8.7 8.4 8.7 9.1   ALBUMIN g/dL  --   --   --   --  4.1       Results from last 7 days   Lab Units 08/31/24  0745   HSTROP T ng/L 22*   PROBNP pg/mL 386.0           Invalid input(s): \"LDLCALC\"  Results from last 7 days   Lab Units 08/31/24  0809   URINECX  >100,000 CFU/mL Escherichia coli*       Test Results Pending at Discharge       Discharge Details        Discharge Medications        New Medications        Instructions Start Date   losartan 25 MG tablet  Commonly known as: COZAAR   25 mg, Oral, Every 24 Hours Scheduled             Changes to Medications        Instructions Start Date   metoprolol tartrate 25 MG tablet  Commonly known as: LOPRESSOR  What changed: how much to take   12.5 mg, Oral, Every 12 Hours Scheduled             Continue These Medications        Instructions Start Date   albuterol sulfate  (90 Base) MCG/ACT inhaler  Commonly known as: PROVENTIL HFA;VENTOLIN HFA;PROAIR HFA   2 puffs, Inhalation, Every 4 Hours PRN      escitalopram 5 MG tablet  Commonly known as: LEXAPRO   20 mg, Oral, Nightly      levothyroxine 75 MCG tablet  Commonly known as: SYNTHROID, LEVOTHROID   75 mcg, Oral, Daily      simvastatin 20 MG tablet  Commonly known as: ZOCOR   20 mg, Oral, Nightly      VIACTIV CALCIUM PLUS D PO   1 tablet, Oral, Daily, 500 mg micheal and 100 units of vitamin D.                No Known Allergies      Discharge Disposition:  Skilled Nursing Facility (DC - External)    Discharge Diet:  Diet Order   Procedures    Diet: Regular/House; Fluid Consistency: Thin (IDDSI 0)       Discharge Activity:   Activity Instructions       Activity as Tolerated              CODE STATUS:    Code Status and Medical Interventions: No CPR (Do Not Attempt to Resuscitate); Limited Support; No intubation (DNI)   Ordered at: 09/01/24 1941    "  Medical Intervention Limits:    No intubation (DNI)     Level Of Support Discussed With:    Patient     Code Status (Patient has no pulse and is not breathing):    No CPR (Do Not Attempt to Resuscitate)     Medical Interventions (Patient has pulse or is breathing):    Limited Support       No future appointments.  Additional Instructions for the Follow-ups that You Need to Schedule       Ambulatory Referral to Home Health   As directed      Face to Face Visit Date: 9/1/2024   Follow-up provider for Plan of Care?: I treated the patient in an acute care facility and will not continue treatment after discharge.   Follow-up provider: KENZIE ADRIAN [[id number]]   Reason/Clinical Findings: increased weakness   Describe mobility limitations that make leaving home difficult: home bound, cannot drive   Nursing/Therapeutic Services Requested: Physical Therapy   Frequency: 1 Week 1        Call MD With Problems / Concerns   As directed      Instructions: return to the hospital if you experience chest pain, shortness of breath, abdominal pain, nausea, vomiting, fevers, sweats, chills, or worsening of your symptoms    Order Comments: Instructions: return to the hospital if you experience chest pain, shortness of breath, abdominal pain, nausea, vomiting, fevers, sweats, chills, or worsening of your symptoms         Discharge Follow-up with PCP   As directed       Currently Documented PCP:    Provider, No Known    PCP Phone Number:    [phone]     Follow Up Details: 1-2 weeks               Contact information for follow-up providers       Provider, No Known .    Why: 1-2 weeks  Contact information:  [facility] [address]  [phone]                       Contact information for after-discharge care       Destination       [facility] .    Service: Skilled Nursing  Contact information:  [address] [facility] [address] [phone]                     Home  Medical Care       [facility] .    Service: Home Health Services  Contact information:  [address]  [facility] [address] [phone]                                   Additional Instructions for the Follow-ups that You Need to Schedule       Ambulatory Referral to Home Health   As directed      Face to Face Visit Date: 9/1/2024   Follow-up provider for Plan of Care?: I treated the patient in an acute care facility and will not continue treatment after discharge.   Follow-up provider: KENZIE ADRIAN [[id number]]   Reason/Clinical Findings: increased weakness   Describe mobility limitations that make leaving home difficult: home bound, cannot drive   Nursing/Therapeutic Services Requested: Physical Therapy   Frequency: 1 Week 1        Call MD With Problems / Concerns   As directed      Instructions: return to the hospital if you experience chest pain, shortness of breath, abdominal pain, nausea, vomiting, fevers, sweats, chills, or worsening of your symptoms    Order Comments: Instructions: return to the hospital if you experience chest pain, shortness of breath, abdominal pain, nausea, vomiting, fevers, sweats, chills, or worsening of your symptoms         Discharge Follow-up with PCP   As directed       Currently Documented PCP:    Provider, No Known    PCP Phone Number:    [phone]     Follow Up Details: 1-2 weeks            Time Spent on Discharge:  Greater than 30 minutes      Justin Brown MD  [facility]  09/05/24  09:58 EDT

## 2024-09-09 NOTE — PROGRESS NOTES
Case Management Discharge Note      Final Note: Discharged to [facility]. Ester Malcolm, JUD    Provided Post Acute Provider List?: N/A  Provided Post Acute Provider Quality & Resource List?: N/A    Selected Continued Care - Discharged on 9/5/2024 Admission date: 8/31/2024 - Discharge disposition: Skilled Nursing Facility (DC - External)      Destination Coordination complete.      Service Provider Selected Services Address Phone Fax Patient Preferred    [facility] [address] [facility] [address] [phone] --       Internal Comment last updated by Martha Higgins MSW 9/5/2024 1047    D/c summary                              Home Medical Care       Service Provider Selected Services Address Phone Fax Patient Preferred    [facility] [address], [address] [address] [phone]                  Transportation Services  [facility]    Final Discharge Disposition Code: 03 - skilled nursing facility (SNF)